# Patient Record
Sex: MALE | Race: WHITE | Employment: FULL TIME | URBAN - METROPOLITAN AREA
[De-identification: names, ages, dates, MRNs, and addresses within clinical notes are randomized per-mention and may not be internally consistent; named-entity substitution may affect disease eponyms.]

---

## 2017-03-14 DIAGNOSIS — G47.09 OTHER INSOMNIA: ICD-10-CM

## 2017-03-14 RX ORDER — ZOLPIDEM TARTRATE 10 MG/1
TABLET ORAL
Qty: 30 TABLET | Refills: 0 | Status: SHIPPED | OUTPATIENT
Start: 2017-03-14 | End: 2018-02-05 | Stop reason: ALTCHOICE

## 2017-04-13 DIAGNOSIS — I10 ESSENTIAL HYPERTENSION: ICD-10-CM

## 2017-04-13 RX ORDER — NIFEDIPINE 60 MG/1
60 TABLET, EXTENDED RELEASE ORAL DAILY
Qty: 30 TABLET | Refills: 3 | Status: SHIPPED | OUTPATIENT
Start: 2017-04-13 | End: 2018-02-05 | Stop reason: ALTCHOICE

## 2018-01-08 ENCOUNTER — OFFICE VISIT (OUTPATIENT)
Dept: FAMILY MEDICINE CLINIC | Age: 53
End: 2018-01-08

## 2018-01-08 VITALS
HEART RATE: 61 BPM | HEIGHT: 71 IN | WEIGHT: 227 LBS | SYSTOLIC BLOOD PRESSURE: 148 MMHG | DIASTOLIC BLOOD PRESSURE: 94 MMHG | BODY MASS INDEX: 31.78 KG/M2 | OXYGEN SATURATION: 97 % | RESPIRATION RATE: 14 BRPM

## 2018-01-08 DIAGNOSIS — I10 ESSENTIAL HYPERTENSION: Primary | ICD-10-CM

## 2018-01-08 DIAGNOSIS — E78.2 MIXED HYPERLIPIDEMIA: ICD-10-CM

## 2018-01-08 DIAGNOSIS — F17.200 SMOKER: ICD-10-CM

## 2018-01-08 PROCEDURE — 4004F PT TOBACCO SCREEN RCVD TLK: CPT | Performed by: FAMILY MEDICINE

## 2018-01-08 PROCEDURE — G8427 DOCREV CUR MEDS BY ELIG CLIN: HCPCS | Performed by: FAMILY MEDICINE

## 2018-01-08 PROCEDURE — 99214 OFFICE O/P EST MOD 30 MIN: CPT | Performed by: FAMILY MEDICINE

## 2018-01-08 PROCEDURE — G8417 CALC BMI ABV UP PARAM F/U: HCPCS | Performed by: FAMILY MEDICINE

## 2018-01-08 PROCEDURE — 3017F COLORECTAL CA SCREEN DOC REV: CPT | Performed by: FAMILY MEDICINE

## 2018-01-08 PROCEDURE — G8484 FLU IMMUNIZE NO ADMIN: HCPCS | Performed by: FAMILY MEDICINE

## 2018-01-08 RX ORDER — LOSARTAN POTASSIUM AND HYDROCHLOROTHIAZIDE 12.5; 5 MG/1; MG/1
1 TABLET ORAL DAILY
Qty: 30 TABLET | Refills: 3 | Status: SHIPPED | OUTPATIENT
Start: 2018-01-08 | End: 2018-02-05 | Stop reason: SDUPTHER

## 2018-01-08 RX ORDER — VARENICLINE TARTRATE 25 MG
KIT ORAL
Qty: 1 EACH | Refills: 0 | Status: SHIPPED | OUTPATIENT
Start: 2018-01-08 | End: 2019-01-03

## 2018-01-08 ASSESSMENT — PATIENT HEALTH QUESTIONNAIRE - PHQ9
2. FEELING DOWN, DEPRESSED OR HOPELESS: 0
SUM OF ALL RESPONSES TO PHQ9 QUESTIONS 1 & 2: 0
SUM OF ALL RESPONSES TO PHQ QUESTIONS 1-9: 0
1. LITTLE INTEREST OR PLEASURE IN DOING THINGS: 0

## 2018-01-08 NOTE — PROGRESS NOTES
Nura Cowan is a 45 y.o. male. HPI:  Mr. Kallie Thomason is a 45 y.o male who is is here for a BP check up. He is currently taking nifedipine for management of his HTN. He takes it off and on, but has been taking it regularly recently. He does not take it regularly because it makes him feel nauseated and dizzy. He gets those symptoms every time he starts the medication. He also is prescribed simvastatin for hyperlipidemia, which he hasn't taken it since July because he believed they caused him to get easier to get sunburned. He described that he got sunburned over the summer, and that the pain caused by it was intense. He heard somewhere that statins caused increased sunburns so he stopped taking it and has not taken it since. Patient does not believe he is getting a lot of physical activity lately. He believes his diet is adequate. Patient is a smoker. Patient started Chantix last year and quit, but started back up in July. He cites the fact that everyone at work smokes as a reason why it is hard to quit. He says he wants to quit, but has been unable to do so longer than a year. No history of COPD. Patient notes that he can get short of breath with physical activity, but it has not caused him any problems. Wt Readings from Last 3 Encounters:   01/08/18 227 lb (103 kg)   02/20/17 230 lb (104.3 kg)   04/13/16 232 lb (105.2 kg)     Meds, vitamins and allergies reviewed with Patient    ROS:  Gen: no fever  HEENT:  No cold symptoms, no sore throat. CV:  Denies chest pain, endorses occasional palpitation at night  Pulm:  Denies cough, endorses shortness of breath. Abd:  Denies abdominal pain, nausea and vomiting. Skin: no rash    Allergies   Allergen Reactions    Lisinopril Other (See Comments)     Cough         Prior to Visit Medications    Medication Sig Taking?  Authorizing Provider   NIFEdipine (NIFEDICAL XL) 60 MG extended release tablet Take 1 tablet by mouth daily Yes Thomas Hinton MD   zolpidem

## 2018-01-29 DIAGNOSIS — E78.2 MIXED HYPERLIPIDEMIA: ICD-10-CM

## 2018-01-29 DIAGNOSIS — I10 ESSENTIAL HYPERTENSION: ICD-10-CM

## 2018-01-29 LAB
A/G RATIO: 1.9 (ref 1.1–2.2)
ALBUMIN SERPL-MCNC: 4.5 G/DL (ref 3.4–5)
ALP BLD-CCNC: 84 U/L (ref 40–129)
ALT SERPL-CCNC: 11 U/L (ref 10–40)
ANION GAP SERPL CALCULATED.3IONS-SCNC: 15 MMOL/L (ref 3–16)
AST SERPL-CCNC: 12 U/L (ref 15–37)
BASOPHILS ABSOLUTE: 0 K/UL (ref 0–0.2)
BASOPHILS RELATIVE PERCENT: 0.6 %
BILIRUB SERPL-MCNC: 0.5 MG/DL (ref 0–1)
BUN BLDV-MCNC: 18 MG/DL (ref 7–20)
CALCIUM SERPL-MCNC: 9.6 MG/DL (ref 8.3–10.6)
CHLORIDE BLD-SCNC: 97 MMOL/L (ref 99–110)
CHOLESTEROL, TOTAL: 228 MG/DL (ref 0–199)
CO2: 28 MMOL/L (ref 21–32)
CREAT SERPL-MCNC: 1.1 MG/DL (ref 0.9–1.3)
EOSINOPHILS ABSOLUTE: 0.2 K/UL (ref 0–0.6)
EOSINOPHILS RELATIVE PERCENT: 2 %
GFR AFRICAN AMERICAN: >60
GFR NON-AFRICAN AMERICAN: >60
GLOBULIN: 2.4 G/DL
GLUCOSE BLD-MCNC: 96 MG/DL (ref 70–99)
HCT VFR BLD CALC: 46.6 % (ref 40.5–52.5)
HDLC SERPL-MCNC: 31 MG/DL (ref 40–60)
HEMOGLOBIN: 15.8 G/DL (ref 13.5–17.5)
HEPATITIS C ANTIBODY INTERPRETATION: NORMAL
LDL CHOLESTEROL CALCULATED: ABNORMAL MG/DL
LDL CHOLESTEROL DIRECT: 133 MG/DL
LYMPHOCYTES ABSOLUTE: 2.1 K/UL (ref 1–5.1)
LYMPHOCYTES RELATIVE PERCENT: 26.5 %
MCH RBC QN AUTO: 30.8 PG (ref 26–34)
MCHC RBC AUTO-ENTMCNC: 33.9 G/DL (ref 31–36)
MCV RBC AUTO: 90.7 FL (ref 80–100)
MONOCYTES ABSOLUTE: 0.8 K/UL (ref 0–1.3)
MONOCYTES RELATIVE PERCENT: 10 %
NEUTROPHILS ABSOLUTE: 4.9 K/UL (ref 1.7–7.7)
NEUTROPHILS RELATIVE PERCENT: 60.9 %
PDW BLD-RTO: 13.3 % (ref 12.4–15.4)
PLATELET # BLD: 230 K/UL (ref 135–450)
PMV BLD AUTO: 7.7 FL (ref 5–10.5)
POTASSIUM SERPL-SCNC: 4.6 MMOL/L (ref 3.5–5.1)
PROSTATE SPECIFIC ANTIGEN: 1.72 NG/ML (ref 0–4)
RBC # BLD: 5.14 M/UL (ref 4.2–5.9)
SODIUM BLD-SCNC: 140 MMOL/L (ref 136–145)
TOTAL PROTEIN: 6.9 G/DL (ref 6.4–8.2)
TRIGL SERPL-MCNC: 463 MG/DL (ref 0–150)
TSH SERPL DL<=0.05 MIU/L-ACNC: 1.46 UIU/ML (ref 0.27–4.2)
VLDLC SERPL CALC-MCNC: ABNORMAL MG/DL
WBC # BLD: 8 K/UL (ref 4–11)

## 2018-01-30 LAB
HIV AG/AB: NORMAL
HIV ANTIGEN: NORMAL
HIV-1 ANTIBODY: NORMAL
HIV-2 AB: NORMAL

## 2018-02-05 ENCOUNTER — TELEPHONE (OUTPATIENT)
Dept: FAMILY MEDICINE CLINIC | Age: 53
End: 2018-02-05

## 2018-02-05 ENCOUNTER — OFFICE VISIT (OUTPATIENT)
Dept: FAMILY MEDICINE CLINIC | Age: 53
End: 2018-02-05

## 2018-02-05 VITALS
HEIGHT: 72 IN | OXYGEN SATURATION: 96 % | RESPIRATION RATE: 14 BRPM | SYSTOLIC BLOOD PRESSURE: 130 MMHG | WEIGHT: 228 LBS | DIASTOLIC BLOOD PRESSURE: 80 MMHG | HEART RATE: 65 BPM | BODY MASS INDEX: 30.88 KG/M2

## 2018-02-05 DIAGNOSIS — Z12.11 SCREENING FOR COLON CANCER: Primary | ICD-10-CM

## 2018-02-05 DIAGNOSIS — R53.83 FATIGUE, UNSPECIFIED TYPE: ICD-10-CM

## 2018-02-05 DIAGNOSIS — Z72.0 TOBACCO ABUSE: ICD-10-CM

## 2018-02-05 DIAGNOSIS — E78.2 MIXED HYPERLIPIDEMIA: ICD-10-CM

## 2018-02-05 DIAGNOSIS — I10 ESSENTIAL HYPERTENSION: ICD-10-CM

## 2018-02-05 PROCEDURE — G8484 FLU IMMUNIZE NO ADMIN: HCPCS | Performed by: FAMILY MEDICINE

## 2018-02-05 PROCEDURE — G8417 CALC BMI ABV UP PARAM F/U: HCPCS | Performed by: FAMILY MEDICINE

## 2018-02-05 PROCEDURE — 4004F PT TOBACCO SCREEN RCVD TLK: CPT | Performed by: FAMILY MEDICINE

## 2018-02-05 PROCEDURE — 99214 OFFICE O/P EST MOD 30 MIN: CPT | Performed by: FAMILY MEDICINE

## 2018-02-05 PROCEDURE — G8427 DOCREV CUR MEDS BY ELIG CLIN: HCPCS | Performed by: FAMILY MEDICINE

## 2018-02-05 PROCEDURE — 3017F COLORECTAL CA SCREEN DOC REV: CPT | Performed by: FAMILY MEDICINE

## 2018-02-05 RX ORDER — AMOXICILLIN 500 MG/1
500 CAPSULE ORAL 3 TIMES DAILY
Qty: 30 CAPSULE | Refills: 0 | Status: SHIPPED | OUTPATIENT
Start: 2018-02-05 | End: 2018-02-15

## 2018-02-05 RX ORDER — VARENICLINE TARTRATE 1 MG/1
1 TABLET, FILM COATED ORAL 2 TIMES DAILY
Qty: 60 TABLET | Refills: 3 | Status: SHIPPED | OUTPATIENT
Start: 2018-02-05 | End: 2019-01-03 | Stop reason: ALTCHOICE

## 2018-02-05 RX ORDER — SIMVASTATIN 40 MG
40 TABLET ORAL EVERY EVENING
Qty: 30 TABLET | Refills: 5 | Status: SHIPPED | OUTPATIENT
Start: 2018-02-05 | End: 2018-11-05 | Stop reason: SDUPTHER

## 2018-02-05 RX ORDER — LOSARTAN POTASSIUM AND HYDROCHLOROTHIAZIDE 12.5; 5 MG/1; MG/1
1 TABLET ORAL DAILY
Qty: 30 TABLET | Refills: 5 | Status: SHIPPED | OUTPATIENT
Start: 2018-02-05 | End: 2019-07-25 | Stop reason: ALTCHOICE

## 2018-02-05 NOTE — PATIENT INSTRUCTIONS
instruction, always ask your healthcare professional. [address] any warranty or liability for your use of this information. Patient Education        Acute High Blood Pressure: Care Instructions  Your Care Instructions    Acute high blood pressure is very high blood pressure. It's a serious problem. Very high blood pressure can damage your brain, heart, eyes, and kidneys. You may have been given medicines to lower your blood pressure. You may have gotten them as pills or through a needle in one of your veins. This is called an IV. And maybe you were given other medicines too. These can be needed when high blood pressure causes other problems. To keep your blood pressure at a lower level, you may need to make healthy lifestyle changes. And you will probably need to take medicines. Be sure to follow up with your doctor about your blood pressure and what you can do about it. Follow-up care is a key part of your treatment and safety. Be sure to make and go to all appointments, and call your doctor if you are having problems. It's also a good idea to know your test results and keep a list of the medicines you take. How can you care for yourself at home? · See your doctor as often as he or she recommends. This is to make sure your blood pressure is under control. You will probably go at least 2 times a year. But it may be more often at first.  · Take your blood pressure medicine exactly as prescribed. You may take one or more types. They include diuretics, beta-blockers, ACE inhibitors, calcium channel blockers, and angiotensin II receptor blockers. Call your doctor if you think you are having a problem with your medicine. · If you take blood pressure medicine, talk to your doctor before you take decongestants or anti-inflammatory medicine, such as ibuprofen. These can raise blood pressure. · Learn how to check your blood pressure at home. Check it often.   · Ask your doctor if it's

## 2018-02-05 NOTE — PROGRESS NOTES
mouth daily  Dispense: 30 tablet; Refill: 5  Suggest she add a baby aspirin as well for primary prevention  3. Mixed hyperlipidemia  Cholesterol back up off medication refill  - simvastatin (ZOCOR) 40 MG tablet; Take 1 tablet by mouth every evening  Dispense: 30 tablet; Refill: 5  - Lipid Panel; Future in 3 months    4. Tobacco abuse  Condition to stay on the continuing pack at least 2 months possibly up to 5  - varenicline (CHANTIX CONTINUING MONTH RALPH) 1 MG tablet; Take 1 tablet by mouth 2 times daily  Dispense: 60 tablet; Refill: 3    5.  Fatigue, unspecified type  Repeat testosterone level at next visit in 3 months  - Testosterone Free and Total Male; Future      Spent 25 minutes with patient greater than 50% time reviewing labs and corning his care

## 2018-02-05 NOTE — TELEPHONE ENCOUNTER
[facility] is having a problem w/e-scribing RX today  Pharmacy was given verbal order for 4 medications written today by Dr. Denisha Lucas

## 2018-05-04 DIAGNOSIS — E78.2 MIXED HYPERLIPIDEMIA: ICD-10-CM

## 2018-05-04 DIAGNOSIS — R53.83 FATIGUE, UNSPECIFIED TYPE: ICD-10-CM

## 2018-05-04 LAB
CHOLESTEROL, TOTAL: 179 MG/DL (ref 0–199)
HDLC SERPL-MCNC: 37 MG/DL (ref 40–60)
LDL CHOLESTEROL CALCULATED: 89 MG/DL
TRIGL SERPL-MCNC: 265 MG/DL (ref 0–150)
VLDLC SERPL CALC-MCNC: 53 MG/DL

## 2018-05-08 LAB
SEX HORMONE BINDING GLOBULIN: 21 NMOL/L (ref 11–80)
TESTOSTERONE FREE-NONMALE: 64.5 PG/ML (ref 47–244)
TESTOSTERONE TOTAL: 262 NG/DL (ref 220–1000)

## 2018-11-05 DIAGNOSIS — E78.2 MIXED HYPERLIPIDEMIA: ICD-10-CM

## 2018-11-05 RX ORDER — SIMVASTATIN 40 MG
40 TABLET ORAL EVERY EVENING
Qty: 30 TABLET | Refills: 3 | Status: SHIPPED | OUTPATIENT
Start: 2018-11-05 | End: 2019-04-10 | Stop reason: SDUPTHER

## 2018-11-05 NOTE — TELEPHONE ENCOUNTER
LOV:2/5/18  LRF: #30 5RF  Lab Results   Component Value Date    CHOL 179 05/04/2018    CHOL 228 (H) 01/29/2018    CHOL 178 09/29/2015     Lab Results   Component Value Date    TRIG 265 (H) 05/04/2018    TRIG 463 (H) 01/29/2018    TRIG 473 (H) 09/29/2015     Lab Results   Component Value Date    HDL 37 (L) 05/04/2018    HDL 31 (L) 01/29/2018    HDL 29 (L) 09/29/2015     Lab Results   Component Value Date    LDLCALC 89 05/04/2018    LDLCALC see below 01/29/2018    LDLCALC see below 09/29/2015     Lab Results   Component Value Date    LABVLDL 53 05/04/2018    LABVLDL see below 01/29/2018    LABVLDL see below 09/29/2015     No results found for: CHOLAMOR

## 2019-01-03 ENCOUNTER — OFFICE VISIT (OUTPATIENT)
Dept: FAMILY MEDICINE CLINIC | Age: 54
End: 2019-01-03
Payer: COMMERCIAL

## 2019-01-03 ENCOUNTER — TELEPHONE (OUTPATIENT)
Dept: FAMILY MEDICINE CLINIC | Age: 54
End: 2019-01-03

## 2019-01-03 VITALS
BODY MASS INDEX: 34.5 KG/M2 | HEART RATE: 58 BPM | OXYGEN SATURATION: 99 % | SYSTOLIC BLOOD PRESSURE: 156 MMHG | DIASTOLIC BLOOD PRESSURE: 95 MMHG | WEIGHT: 254.4 LBS

## 2019-01-03 DIAGNOSIS — E78.2 MIXED HYPERLIPIDEMIA: ICD-10-CM

## 2019-01-03 DIAGNOSIS — S05.01XD ABRASION OF RIGHT CORNEA, SUBSEQUENT ENCOUNTER: ICD-10-CM

## 2019-01-03 DIAGNOSIS — I10 ESSENTIAL HYPERTENSION: Primary | ICD-10-CM

## 2019-01-03 PROCEDURE — 4004F PT TOBACCO SCREEN RCVD TLK: CPT | Performed by: FAMILY MEDICINE

## 2019-01-03 PROCEDURE — 99213 OFFICE O/P EST LOW 20 MIN: CPT | Performed by: FAMILY MEDICINE

## 2019-01-03 PROCEDURE — G8427 DOCREV CUR MEDS BY ELIG CLIN: HCPCS | Performed by: FAMILY MEDICINE

## 2019-01-03 PROCEDURE — G8484 FLU IMMUNIZE NO ADMIN: HCPCS | Performed by: FAMILY MEDICINE

## 2019-01-03 PROCEDURE — G8417 CALC BMI ABV UP PARAM F/U: HCPCS | Performed by: FAMILY MEDICINE

## 2019-01-03 PROCEDURE — 3017F COLORECTAL CA SCREEN DOC REV: CPT | Performed by: FAMILY MEDICINE

## 2019-01-03 RX ORDER — HYDROCHLOROTHIAZIDE 12.5 MG/1
12.5 CAPSULE, GELATIN COATED ORAL EVERY MORNING
Qty: 30 CAPSULE | Refills: 5 | Status: SHIPPED | OUTPATIENT
Start: 2019-01-03 | End: 2019-07-25 | Stop reason: ALTCHOICE

## 2019-01-03 RX ORDER — OFLOXACIN 3 MG/ML
SOLUTION/ DROPS OPHTHALMIC
Refills: 0 | COMMUNITY
Start: 2018-12-09 | End: 2019-07-25

## 2019-01-03 RX ORDER — AMLODIPINE BESYLATE 10 MG/1
10 TABLET ORAL DAILY
Qty: 30 TABLET | Refills: 3 | Status: SHIPPED | OUTPATIENT
Start: 2019-01-03 | End: 2019-05-21 | Stop reason: SDUPTHER

## 2019-01-07 ENCOUNTER — TELEPHONE (OUTPATIENT)
Dept: FAMILY MEDICINE CLINIC | Age: 54
End: 2019-01-07

## 2019-01-07 DIAGNOSIS — H57.11 PAIN OF RIGHT EYE: Primary | ICD-10-CM

## 2019-04-10 DIAGNOSIS — E78.2 MIXED HYPERLIPIDEMIA: ICD-10-CM

## 2019-04-10 RX ORDER — SIMVASTATIN 40 MG
40 TABLET ORAL NIGHTLY
Qty: 30 TABLET | Refills: 5 | Status: SHIPPED | OUTPATIENT
Start: 2019-04-10 | End: 2019-05-14 | Stop reason: SDUPTHER

## 2019-04-10 NOTE — TELEPHONE ENCOUNTER
LOV: 1/3/19- HTN    LRF: 11/5/18 ,# 30, 3 refills    Next Ov: none    Pt due for labs. Do you want to see pt for OV as well?     Lab order and refill of 30 days pending        Lab Results   Component Value Date    CHOL 179 05/04/2018    CHOL 228 (H) 01/29/2018    CHOL 178 09/29/2015     Lab Results   Component Value Date    TRIG 265 (H) 05/04/2018    TRIG 463 (H) 01/29/2018    TRIG 473 (H) 09/29/2015     Lab Results   Component Value Date    HDL 37 (L) 05/04/2018    HDL 31 (L) 01/29/2018    HDL 29 (L) 09/29/2015     Lab Results   Component Value Date    LDLCALC 89 05/04/2018    LDLCALC see below 01/29/2018    LDLCALC see below 09/29/2015     Lab Results   Component Value Date    LABVLDL 53 05/04/2018    LABVLDL see below 01/29/2018    LABVLDL see below 09/29/2015     No results found for: TERRENCE

## 2019-05-14 ENCOUNTER — OFFICE VISIT (OUTPATIENT)
Dept: FAMILY MEDICINE CLINIC | Age: 54
End: 2019-05-14
Payer: COMMERCIAL

## 2019-05-14 VITALS
OXYGEN SATURATION: 98 % | HEIGHT: 72 IN | SYSTOLIC BLOOD PRESSURE: 154 MMHG | HEART RATE: 65 BPM | BODY MASS INDEX: 33.18 KG/M2 | WEIGHT: 245 LBS | DIASTOLIC BLOOD PRESSURE: 95 MMHG

## 2019-05-14 DIAGNOSIS — E78.2 MIXED HYPERLIPIDEMIA: ICD-10-CM

## 2019-05-14 PROCEDURE — 3017F COLORECTAL CA SCREEN DOC REV: CPT | Performed by: FAMILY MEDICINE

## 2019-05-14 PROCEDURE — G8427 DOCREV CUR MEDS BY ELIG CLIN: HCPCS | Performed by: FAMILY MEDICINE

## 2019-05-14 PROCEDURE — 99213 OFFICE O/P EST LOW 20 MIN: CPT | Performed by: FAMILY MEDICINE

## 2019-05-14 PROCEDURE — 1036F TOBACCO NON-USER: CPT | Performed by: FAMILY MEDICINE

## 2019-05-14 PROCEDURE — G8417 CALC BMI ABV UP PARAM F/U: HCPCS | Performed by: FAMILY MEDICINE

## 2019-05-14 RX ORDER — SIMVASTATIN 40 MG
40 TABLET ORAL NIGHTLY
Qty: 30 TABLET | Refills: 5 | Status: SHIPPED | OUTPATIENT
Start: 2019-05-14 | End: 2019-08-27 | Stop reason: SDUPTHER

## 2019-05-14 RX ORDER — SULFACETAMIDE SODIUM 100 MG/ML
2 SOLUTION/ DROPS OPHTHALMIC 4 TIMES DAILY
Qty: 1 BOTTLE | Refills: 0 | Status: SHIPPED | OUTPATIENT
Start: 2019-05-14 | End: 2019-10-14 | Stop reason: SDUPTHER

## 2019-05-14 RX ORDER — OLOPATADINE HYDROCHLORIDE 1 MG/ML
1 SOLUTION/ DROPS OPHTHALMIC 2 TIMES DAILY
Qty: 1 BOTTLE | Refills: 1 | Status: SHIPPED | OUTPATIENT
Start: 2019-05-14 | End: 2019-06-13

## 2019-05-14 ASSESSMENT — PATIENT HEALTH QUESTIONNAIRE - PHQ9
SUM OF ALL RESPONSES TO PHQ QUESTIONS 1-9: 0
SUM OF ALL RESPONSES TO PHQ9 QUESTIONS 1 & 2: 0
2. FEELING DOWN, DEPRESSED OR HOPELESS: 0
SUM OF ALL RESPONSES TO PHQ QUESTIONS 1-9: 0
1. LITTLE INTEREST OR PLEASURE IN DOING THINGS: 0

## 2019-05-14 NOTE — PROGRESS NOTES
Gallo Shukla is a 52 y.o. male. HPI: Seen here several months ago for corneal abrasion  Then went to this incisor tooth and they thought his abrasion had resolved but gave him and by x-ray bacterial pinkeye  Patient states that his right eye never fully recovered is always been kind of scratchy and itchy in both eyes in the last week become scratchy itchy and had some discharge  Denies any other allergy symptoms such as sneezing runny nose scratchy throat  Does not wear contact lenses  No recent eye trauma and no change in vision  Meds, vitamins and allergies reviewed with pt    ROS: No TIA's or unusual headaches, no dysphagia. No prolonged cough. No dyspnea or chest pain on exertion. No abdominal pain, change in bowel habits, black or bloody stools. No urinary tract symptoms. No new or unusual musculoskeletal symptoms. Prior to Visit Medications    Medication Sig Taking?  Authorizing Provider   sulfacetamide (BLEPH-10) 10 % ophthalmic solution Place 2 drops into the right eye 4 times daily for 10 days Yes Quan So MD   olopatadine (PATANOL) 0.1 % ophthalmic solution Place 1 drop into both eyes 2 times daily Yes Quan So MD   amLODIPine (NORVASC) 10 MG tablet Take 1 tablet by mouth daily Yes Quan So MD   hydrochlorothiazide (MICROZIDE) 12.5 MG capsule Take 1 capsule by mouth every morning Yes Quan So MD   simvastatin (ZOCOR) 40 MG tablet Take 1 tablet by mouth nightly  Quan So MD   ofloxacin (OCUFLOX) 0.3 % solution USE 1-2 DROPS AFFECTED EYE EVERY 2 HOURS WHILE AWAKE X2DAYS, THEN EVERY 4 HRS WHILE AWAKE [address] [facility] MD Aleida   losartan-hydrochlorothiazide (HYZAAR) 50-12.5 MG per tablet Take 1 tablet by mouth daily  Quan So MD       Past Medical History:   Diagnosis Date    Hyperlipidemia     Hypertension     Meningitis     02/2007    Smoker        Social History     Tobacco Use    Smoking status: Former Smoker Packs/day: 0.50     Years: 30.00     Pack years: 15.00     Types: Cigarettes    Smokeless tobacco: Never Used   Substance Use Topics    Alcohol use: Yes     Alcohol/week: 3.6 oz     Types: 6 Standard drinks or equivalent per week    Drug use: No       Family History   Problem Relation Age of Onset    Heart Disease Father        Allergies   Allergen Reactions    Lisinopril Other (See Comments)     Cough         OBJECTIVE:  BP (!) 154/95   Pulse 65   Ht 6' 0.01\" (1.829 m)   Wt 245 lb (111.1 kg)   SpO2 98%   BMI 33.22 kg/m²   GEN:  in NAD  HEENT:  PERRLA. EOMI. Conjunctiva slightly injected, sclera white  NECK:  Supple without adenopathy. CV:  Regular rate and rhythm, S1 and S2 normal, no murmurs, clicks  PULM:  Chest is clear, no wheezing ,  symmetric air entry throughout both lung fields.       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

## 2019-05-21 RX ORDER — AMLODIPINE BESYLATE 10 MG/1
TABLET ORAL
Qty: 30 TABLET | Refills: 11 | Status: SHIPPED | OUTPATIENT
Start: 2019-05-21 | End: 2019-08-27 | Stop reason: SDUPTHER

## 2019-05-21 NOTE — TELEPHONE ENCOUNTER
Last ov: 05/14/19, Mixed hyperlipidemia  Last refill: 01/03/19, #30, 5 refills    Lab Results   Component Value Date     01/29/2018    K 4.6 01/29/2018    CL 97 (L) 01/29/2018    CO2 28 01/29/2018    BUN 18 01/29/2018    CREATININE 1.1 01/29/2018    GLUCOSE 96 01/29/2018    CALCIUM 9.6 01/29/2018    PROT 6.9 01/29/2018    LABALBU 4.5 01/29/2018    BILITOT 0.5 01/29/2018    ALKPHOS 84 01/29/2018    AST 12 (L) 01/29/2018    ALT 11 01/29/2018    LABGLOM >60 01/29/2018    GFRAA >60 01/29/2018    AGRATIO 1.9 01/29/2018    GLOB 2.4 01/29/2018

## 2019-07-25 ENCOUNTER — OFFICE VISIT (OUTPATIENT)
Dept: FAMILY MEDICINE CLINIC | Age: 54
End: 2019-07-25
Payer: COMMERCIAL

## 2019-07-25 ENCOUNTER — NURSE TRIAGE (OUTPATIENT)
Dept: OTHER | Facility: CLINIC | Age: 54
End: 2019-07-25

## 2019-07-25 VITALS
SYSTOLIC BLOOD PRESSURE: 154 MMHG | WEIGHT: 241.4 LBS | BODY MASS INDEX: 32.73 KG/M2 | OXYGEN SATURATION: 98 % | HEART RATE: 62 BPM | DIASTOLIC BLOOD PRESSURE: 86 MMHG

## 2019-07-25 DIAGNOSIS — I10 ESSENTIAL HYPERTENSION: Primary | ICD-10-CM

## 2019-07-25 DIAGNOSIS — L98.9 SKIN LESION OF LEFT LEG: ICD-10-CM

## 2019-07-25 DIAGNOSIS — R79.89 LOW TESTOSTERONE IN MALE: ICD-10-CM

## 2019-07-25 DIAGNOSIS — Z12.5 SCREENING FOR PROSTATE CANCER: ICD-10-CM

## 2019-07-25 DIAGNOSIS — Z13.1 SCREENING FOR DIABETES MELLITUS: ICD-10-CM

## 2019-07-25 PROCEDURE — G8427 DOCREV CUR MEDS BY ELIG CLIN: HCPCS | Performed by: NURSE PRACTITIONER

## 2019-07-25 PROCEDURE — 99214 OFFICE O/P EST MOD 30 MIN: CPT | Performed by: NURSE PRACTITIONER

## 2019-07-25 PROCEDURE — 1036F TOBACCO NON-USER: CPT | Performed by: NURSE PRACTITIONER

## 2019-07-25 PROCEDURE — G8417 CALC BMI ABV UP PARAM F/U: HCPCS | Performed by: NURSE PRACTITIONER

## 2019-07-25 PROCEDURE — 3017F COLORECTAL CA SCREEN DOC REV: CPT | Performed by: NURSE PRACTITIONER

## 2019-07-25 RX ORDER — HYDROCHLOROTHIAZIDE 25 MG/1
25 TABLET ORAL EVERY MORNING
Qty: 90 TABLET | Refills: 1 | Status: ON HOLD | OUTPATIENT
Start: 2019-07-25 | End: 2019-08-13 | Stop reason: HOSPADM

## 2019-07-25 RX ORDER — CEPHALEXIN 500 MG/1
500 CAPSULE ORAL 3 TIMES DAILY
Qty: 30 CAPSULE | Refills: 0 | Status: SHIPPED | OUTPATIENT
Start: 2019-07-25 | End: 2019-08-04

## 2019-07-25 ASSESSMENT — ENCOUNTER SYMPTOMS
COUGH: 0
CHEST TIGHTNESS: 0
SHORTNESS OF BREATH: 0

## 2019-07-25 NOTE — PROGRESS NOTES
SUBJECTIVE:  Pt is a of 62 y.o. male comes in today with   Chief Complaint   Patient presents with    Wound Infection     pt has infected wound on left leg. x 1 week        Patient presenting today for evaluation of redness and swelling to left leg. Started approx 1 week ago, denies injury. Worried it could be spider bite  No fevers/malaise  No itching  No pain  No drainage, using bandaid and appears to have pus on bandaid  Using abx ointment and worsening      Hypertension:  Home blood pressure monitoring: Yes - 140's-150's/80's-90's. He is adherent to a low sodium diet. C/o sensation \"that something is wrong, but can't pinpoint what\". Patient denies chest pain, shortness of breath, headache, lightheadedness, blurred vision, peripheral edema and palpitations. Antihypertensive medication side effects: no medication side effects noted. Use of agents associated with hypertension: none. He is  exercising regularly- weights and cardio 4-5/week. None in past month  Is the patient taking any over the counter medications? No   If yes, see med list as above  Is the patient taking a daily aspirin? No    Low testosterone: no supplement since 2013. Not sure why it was stopped. (+) fatigue, decreased libido and body aches. previously taking androgel    Prior to Visit Medications    Medication Sig Taking? Authorizing Provider   amLODIPine (NORVASC) 10 MG tablet TAKE 1 TABLET BY MOUTH EVERY DAY Yes Amparo Bruce MD   simvastatin (ZOCOR) 40 MG tablet Take 1 tablet by mouth nightly Yes Amparo Bruce MD   hydrochlorothiazide (MICROZIDE) 12.5 MG capsule Take 1 capsule by mouth every morning Yes Amparo Bruce MD   losartan-hydrochlorothiazide (HYZAAR) 50-12.5 MG per tablet Take 1 tablet by mouth daily Yes Amparo Bruce MD         Patient's allergies, past medical, surgical, social and family histories werereviewed and updated as appropriate. Review of Systems   Constitutional: Positive for fatigue. in male  -     Testosterone, free, total; Future    4. Screening for prostate cancer  -     PSA, Total and Free; Future    5. Screening for diabetes mellitus  -     Hemoglobin A1C; Future  See pt instructions  F/u 3-4 weeks for HTN visit  Discussed use, benefit, and side effects of prescribed medications. All patient questions answered. Pt voiced understanding.

## 2019-07-25 NOTE — PATIENT INSTRUCTIONS
Patient Education        Learning About Diuretics for High Blood Pressure  Introduction  Diuretics help to lower blood pressure. This reduces your risk of a heart attack and stroke. It also reduces your risk of kidney disease. Diuretics cause your kidneys to remove sodium and water. They also relax the blood vessel walls. These help lower your blood pressure. Examples  · Chlorthalidone  · Hydrochlorothiazide  Possible side effects  There are some common side effects. They are:  · Too little potassium. · Feeling dizzy. · Rash. · Urinating a lot. · High blood sugar. (But this is not common.)  You may have other side effects. Check the information that comes with your medicine. What to know about taking this medicine  · You may take other medicines for blood pressure. Diuretics can help those work better. They can also prevent extra fluid in your body. · You may need to take potassium pills. Or you may have to watch how much potassium is in your food. Ask your doctor about this. · You may need blood tests to check your kidneys and your potassium level. · Take your medicines exactly as prescribed. Call your doctor if you think you are having a problem with your medicine. · Check with your doctor or pharmacist before you use any other medicines. This includes over-the-counter medicines. Make sure your doctor knows all of the medicines, vitamins, herbal products, and supplements you take. Taking some medicines together can cause problems. Where can you learn more? Go to [email or web address].[email or web address]. org and sign in to your [employer] account. Enter G431 in the [facility] box to learn more about \"Learning About Diuretics for High Blood Pressure. \"     If you do not have an account, please click on the \"Sign Up Now\" link. Current as of: July 22, 2018  Content Version: 12.0  © 9814-3813 [employer]wise, [employer]orporated. Care instructions adapted under license by [facility] ([facility]).  If you have questions

## 2019-08-05 RX ORDER — HYDROCHLOROTHIAZIDE 12.5 MG/1
CAPSULE, GELATIN COATED ORAL
Qty: 30 CAPSULE | Refills: 11 | Status: ON HOLD | OUTPATIENT
Start: 2019-08-05 | End: 2019-08-12 | Stop reason: DRUGHIGH

## 2019-08-11 ENCOUNTER — APPOINTMENT (OUTPATIENT)
Dept: GENERAL RADIOLOGY | Age: 54
End: 2019-08-11
Payer: COMMERCIAL

## 2019-08-11 ENCOUNTER — HOSPITAL ENCOUNTER (OUTPATIENT)
Age: 54
Setting detail: OBSERVATION
Discharge: HOME | End: 2019-08-13
Attending: EMERGENCY MEDICINE | Admitting: PEDIATRICS
Payer: COMMERCIAL

## 2019-08-11 DIAGNOSIS — R07.9 CHEST PAIN, UNSPECIFIED TYPE: Primary | ICD-10-CM

## 2019-08-11 LAB
ANION GAP SERPL CALCULATED.3IONS-SCNC: 12 MMOL/L (ref 3–16)
BASOPHILS ABSOLUTE: 0.1 K/UL (ref 0–0.2)
BASOPHILS RELATIVE PERCENT: 0.7 %
BUN BLDV-MCNC: 23 MG/DL (ref 7–20)
CALCIUM SERPL-MCNC: 9.6 MG/DL (ref 8.3–10.6)
CHLORIDE BLD-SCNC: 102 MMOL/L (ref 99–110)
CO2: 25 MMOL/L (ref 21–32)
CREAT SERPL-MCNC: 1.5 MG/DL (ref 0.9–1.3)
EOSINOPHILS ABSOLUTE: 0.2 K/UL (ref 0–0.6)
EOSINOPHILS RELATIVE PERCENT: 2.1 %
GFR AFRICAN AMERICAN: 59
GFR NON-AFRICAN AMERICAN: 48
GLUCOSE BLD-MCNC: 102 MG/DL (ref 70–99)
HCT VFR BLD CALC: 44.2 % (ref 40.5–52.5)
HEMOGLOBIN: 15.2 G/DL (ref 13.5–17.5)
LYMPHOCYTES ABSOLUTE: 2.2 K/UL (ref 1–5.1)
LYMPHOCYTES RELATIVE PERCENT: 28.6 %
MCH RBC QN AUTO: 31.1 PG (ref 26–34)
MCHC RBC AUTO-ENTMCNC: 34.5 G/DL (ref 31–36)
MCV RBC AUTO: 90.3 FL (ref 80–100)
MONOCYTES ABSOLUTE: 0.8 K/UL (ref 0–1.3)
MONOCYTES RELATIVE PERCENT: 10.2 %
NEUTROPHILS ABSOLUTE: 4.5 K/UL (ref 1.7–7.7)
NEUTROPHILS RELATIVE PERCENT: 58.4 %
PDW BLD-RTO: 13.8 % (ref 12.4–15.4)
PLATELET # BLD: 243 K/UL (ref 135–450)
PMV BLD AUTO: 7.3 FL (ref 5–10.5)
POTASSIUM SERPL-SCNC: 4.1 MMOL/L (ref 3.5–5.1)
RBC # BLD: 4.89 M/UL (ref 4.2–5.9)
SODIUM BLD-SCNC: 139 MMOL/L (ref 136–145)
TROPONIN: <0.01 NG/ML
WBC # BLD: 7.7 K/UL (ref 4–11)

## 2019-08-11 PROCEDURE — G0378 HOSPITAL OBSERVATION PER HR: HCPCS

## 2019-08-11 PROCEDURE — 71046 X-RAY EXAM CHEST 2 VIEWS: CPT

## 2019-08-11 PROCEDURE — 93005 ELECTROCARDIOGRAM TRACING: CPT | Performed by: EMERGENCY MEDICINE

## 2019-08-11 PROCEDURE — 2100000000 HC CCU R&B

## 2019-08-11 PROCEDURE — 80048 BASIC METABOLIC PNL TOTAL CA: CPT

## 2019-08-11 PROCEDURE — 84484 ASSAY OF TROPONIN QUANT: CPT

## 2019-08-11 PROCEDURE — 85025 COMPLETE CBC W/AUTO DIFF WBC: CPT

## 2019-08-11 PROCEDURE — 99285 EMERGENCY DEPT VISIT HI MDM: CPT

## 2019-08-11 RX ORDER — ONDANSETRON 2 MG/ML
4 INJECTION INTRAMUSCULAR; INTRAVENOUS EVERY 6 HOURS PRN
Status: DISCONTINUED | OUTPATIENT
Start: 2019-08-11 | End: 2019-08-13 | Stop reason: HOSPADM

## 2019-08-11 RX ORDER — SODIUM CHLORIDE 0.9 % (FLUSH) 0.9 %
10 SYRINGE (ML) INJECTION EVERY 12 HOURS SCHEDULED
Status: DISCONTINUED | OUTPATIENT
Start: 2019-08-12 | End: 2019-08-13 | Stop reason: HOSPADM

## 2019-08-11 RX ORDER — SIMVASTATIN 40 MG
40 TABLET ORAL NIGHTLY
Status: DISCONTINUED | OUTPATIENT
Start: 2019-08-12 | End: 2019-08-11 | Stop reason: CLARIF

## 2019-08-11 RX ORDER — AMLODIPINE BESYLATE 5 MG/1
10 TABLET ORAL DAILY
Status: DISCONTINUED | OUTPATIENT
Start: 2019-08-12 | End: 2019-08-13 | Stop reason: HOSPADM

## 2019-08-11 RX ORDER — HYDROCHLOROTHIAZIDE 25 MG/1
25 TABLET ORAL EVERY MORNING
Status: DISCONTINUED | OUTPATIENT
Start: 2019-08-12 | End: 2019-08-12

## 2019-08-11 RX ORDER — SODIUM CHLORIDE 0.9 % (FLUSH) 0.9 %
10 SYRINGE (ML) INJECTION PRN
Status: DISCONTINUED | OUTPATIENT
Start: 2019-08-11 | End: 2019-08-13 | Stop reason: HOSPADM

## 2019-08-11 ASSESSMENT — PAIN DESCRIPTION - PAIN TYPE: TYPE: ACUTE PAIN

## 2019-08-11 ASSESSMENT — HEART SCORE: ECG: 0

## 2019-08-11 ASSESSMENT — PAIN SCALES - GENERAL: PAINLEVEL_OUTOF10: 1

## 2019-08-11 ASSESSMENT — PAIN DESCRIPTION - LOCATION: LOCATION: CHEST

## 2019-08-12 ENCOUNTER — APPOINTMENT (OUTPATIENT)
Dept: CT IMAGING | Age: 54
End: 2019-08-12
Payer: COMMERCIAL

## 2019-08-12 LAB
ANION GAP SERPL CALCULATED.3IONS-SCNC: 11 MMOL/L (ref 3–16)
BUN BLDV-MCNC: 18 MG/DL (ref 7–20)
CALCIUM SERPL-MCNC: 9.4 MG/DL (ref 8.3–10.6)
CHLORIDE BLD-SCNC: 102 MMOL/L (ref 99–110)
CO2: 25 MMOL/L (ref 21–32)
CREAT SERPL-MCNC: 1.2 MG/DL (ref 0.9–1.3)
EKG ATRIAL RATE: 69 BPM
EKG DIAGNOSIS: NORMAL
EKG P AXIS: -20 DEGREES
EKG P-R INTERVAL: 120 MS
EKG Q-T INTERVAL: 384 MS
EKG QRS DURATION: 88 MS
EKG QTC CALCULATION (BAZETT): 411 MS
EKG R AXIS: 25 DEGREES
EKG T AXIS: 33 DEGREES
EKG VENTRICULAR RATE: 69 BPM
GFR AFRICAN AMERICAN: >60
GFR NON-AFRICAN AMERICAN: >60
GLUCOSE BLD-MCNC: 95 MG/DL (ref 70–99)
HCT VFR BLD CALC: 42.9 % (ref 40.5–52.5)
HEMOGLOBIN: 14.6 G/DL (ref 13.5–17.5)
LV EF: 64 %
LVEF MODALITY: NORMAL
MCH RBC QN AUTO: 30.6 PG (ref 26–34)
MCHC RBC AUTO-ENTMCNC: 34.1 G/DL (ref 31–36)
MCV RBC AUTO: 89.8 FL (ref 80–100)
PDW BLD-RTO: 13.6 % (ref 12.4–15.4)
PLATELET # BLD: 228 K/UL (ref 135–450)
PMV BLD AUTO: 7.5 FL (ref 5–10.5)
POTASSIUM SERPL-SCNC: 4.1 MMOL/L (ref 3.5–5.1)
RBC # BLD: 4.78 M/UL (ref 4.2–5.9)
SODIUM BLD-SCNC: 138 MMOL/L (ref 136–145)
TROPONIN: <0.01 NG/ML
TROPONIN: <0.01 NG/ML
WBC # BLD: 6.6 K/UL (ref 4–11)

## 2019-08-12 PROCEDURE — 84484 ASSAY OF TROPONIN QUANT: CPT

## 2019-08-12 PROCEDURE — 71275 CT ANGIOGRAPHY CHEST: CPT

## 2019-08-12 PROCEDURE — 99244 OFF/OP CNSLTJ NEW/EST MOD 40: CPT | Performed by: INTERNAL MEDICINE

## 2019-08-12 PROCEDURE — 85027 COMPLETE CBC AUTOMATED: CPT

## 2019-08-12 PROCEDURE — 6360000004 HC RX CONTRAST MEDICATION: Performed by: INTERNAL MEDICINE

## 2019-08-12 PROCEDURE — 2580000003 HC RX 258: Performed by: PEDIATRICS

## 2019-08-12 PROCEDURE — 6360000002 HC RX W HCPCS: Performed by: PEDIATRICS

## 2019-08-12 PROCEDURE — 2580000003 HC RX 258: Performed by: INTERNAL MEDICINE

## 2019-08-12 PROCEDURE — 96360 HYDRATION IV INFUSION INIT: CPT

## 2019-08-12 PROCEDURE — G0378 HOSPITAL OBSERVATION PER HR: HCPCS

## 2019-08-12 PROCEDURE — 96372 THER/PROPH/DIAG INJ SC/IM: CPT

## 2019-08-12 PROCEDURE — A9502 TC99M TETROFOSMIN: HCPCS | Performed by: INTERNAL MEDICINE

## 2019-08-12 PROCEDURE — 93017 CV STRESS TEST TRACING ONLY: CPT | Performed by: INTERNAL MEDICINE

## 2019-08-12 PROCEDURE — 93010 ELECTROCARDIOGRAM REPORT: CPT | Performed by: INTERNAL MEDICINE

## 2019-08-12 PROCEDURE — 3430000000 HC RX DIAGNOSTIC RADIOPHARMACEUTICAL: Performed by: INTERNAL MEDICINE

## 2019-08-12 PROCEDURE — 80048 BASIC METABOLIC PNL TOTAL CA: CPT

## 2019-08-12 PROCEDURE — 78452 HT MUSCLE IMAGE SPECT MULT: CPT | Performed by: INTERNAL MEDICINE

## 2019-08-12 PROCEDURE — 94760 N-INVAS EAR/PLS OXIMETRY 1: CPT

## 2019-08-12 PROCEDURE — 6370000000 HC RX 637 (ALT 250 FOR IP): Performed by: PEDIATRICS

## 2019-08-12 PROCEDURE — 6370000000 HC RX 637 (ALT 250 FOR IP): Performed by: INTERNAL MEDICINE

## 2019-08-12 PROCEDURE — 96361 HYDRATE IV INFUSION ADD-ON: CPT

## 2019-08-12 RX ORDER — ROSUVASTATIN CALCIUM 10 MG/1
10 TABLET, COATED ORAL NIGHTLY
Status: DISCONTINUED | OUTPATIENT
Start: 2019-08-12 | End: 2019-08-13 | Stop reason: HOSPADM

## 2019-08-12 RX ORDER — DOXAZOSIN MESYLATE 4 MG/1
4 TABLET ORAL NIGHTLY
Status: DISCONTINUED | OUTPATIENT
Start: 2019-08-12 | End: 2019-08-13

## 2019-08-12 RX ORDER — HYDRALAZINE HYDROCHLORIDE 20 MG/ML
10 INJECTION INTRAMUSCULAR; INTRAVENOUS EVERY 6 HOURS PRN
Status: DISCONTINUED | OUTPATIENT
Start: 2019-08-12 | End: 2019-08-13 | Stop reason: HOSPADM

## 2019-08-12 RX ORDER — NITROGLYCERIN 0.4 MG/1
0.4 TABLET SUBLINGUAL ONCE
Status: COMPLETED | OUTPATIENT
Start: 2019-08-12 | End: 2019-08-12

## 2019-08-12 RX ORDER — ASPIRIN 81 MG/1
81 TABLET, CHEWABLE ORAL DAILY
COMMUNITY
End: 2020-08-18 | Stop reason: CLARIF

## 2019-08-12 RX ORDER — SODIUM CHLORIDE 9 MG/ML
INJECTION, SOLUTION INTRAVENOUS CONTINUOUS
Status: DISCONTINUED | OUTPATIENT
Start: 2019-08-12 | End: 2019-08-13

## 2019-08-12 RX ADMIN — AMLODIPINE BESYLATE 10 MG: 5 TABLET ORAL at 01:36

## 2019-08-12 RX ADMIN — IOPAMIDOL 75 ML: 755 INJECTION, SOLUTION INTRAVENOUS at 15:19

## 2019-08-12 RX ADMIN — DOXAZOSIN 4 MG: 4 TABLET ORAL at 20:12

## 2019-08-12 RX ADMIN — ROSUVASTATIN CALCIUM 10 MG: 10 TABLET, FILM COATED ORAL at 01:36

## 2019-08-12 RX ADMIN — AMLODIPINE BESYLATE 10 MG: 5 TABLET ORAL at 20:12

## 2019-08-12 RX ADMIN — ENOXAPARIN SODIUM 100 MG: 100 INJECTION SUBCUTANEOUS at 01:39

## 2019-08-12 RX ADMIN — NITROGLYCERIN 0.4 MG: 0.4 TABLET, ORALLY DISINTEGRATING SUBLINGUAL at 01:38

## 2019-08-12 RX ADMIN — ROSUVASTATIN CALCIUM 10 MG: 10 TABLET, FILM COATED ORAL at 20:12

## 2019-08-12 RX ADMIN — Medication 10 ML: at 07:39

## 2019-08-12 RX ADMIN — TETROFOSMIN 10 MILLICURIE: 1.38 INJECTION, POWDER, LYOPHILIZED, FOR SOLUTION INTRAVENOUS at 13:29

## 2019-08-12 RX ADMIN — SODIUM CHLORIDE: 9 INJECTION, SOLUTION INTRAVENOUS at 15:44

## 2019-08-12 RX ADMIN — TETROFOSMIN 30 MILLICURIE: 1.38 INJECTION, POWDER, LYOPHILIZED, FOR SOLUTION INTRAVENOUS at 14:26

## 2019-08-12 ASSESSMENT — PAIN SCALES - GENERAL
PAINLEVEL_OUTOF10: 0

## 2019-08-12 ASSESSMENT — PAIN DESCRIPTION - FREQUENCY: FREQUENCY: INTERMITTENT

## 2019-08-12 ASSESSMENT — PAIN DESCRIPTION - LOCATION: LOCATION: CHEST

## 2019-08-12 ASSESSMENT — PAIN DESCRIPTION - ORIENTATION: ORIENTATION: LEFT;ANTERIOR

## 2019-08-12 ASSESSMENT — PAIN DESCRIPTION - DESCRIPTORS: DESCRIPTORS: PRESSURE

## 2019-08-12 ASSESSMENT — PAIN DESCRIPTION - ONSET: ONSET: ON-GOING

## 2019-08-12 NOTE — ED NOTES
Pt denies any chest pain at present. No work of breathing noted; resps easy. Report attempted to be called to CVU. Nurse to call back.      Mariah José Post, RN  08/11/19 4477

## 2019-08-12 NOTE — ED PROVIDER NOTES
[address] [facility]  EMERGENCY DEPARTMENTENCOUNTER      Pt Name: Stacie Blanco  MRN: [id number]  [place] 1965  Date ofevaluation: 8/11/2019  Provider: Beatris Virgen MD    CHIEF COMPLAINT       Chief Complaint   Patient presents with    Chest Pain     pt reports chest pain, blood pressure issues, left arm tingling. patient reports he stopped taking his hydrochlorithiazide because he felt like it was dehydrating him. lightheaded. recently returned from [place] where evaluated for the same         HISTORY OF PRESENT ILLNESS   (Location/Symptom, Timing/Onset,Context/Setting, Quality, Duration, Modifying Factors, Severity)  Note limiting factors. Stacie Blanco is a 62 y.o. male who presents to the emergency department with chest pain. The patient presents with substernal chest pain going to his left arm with some tingling. The patient states he stopped taking his hydrochlorthiazide recently as well. The patient complained of lightheadedness. The patient states the pain started around noon today and has been on and off for a long period    HPI    NursingNotes were reviewed. REVIEW OF SYSTEMS    (2-9 systems for level 4, 10 or more for level 5)     Review of Systems     Constitutional: Negative for fever. HENT: Negative for rhinorrhea and sore throat. Eyes: Negative for redness. Respiratory: Negative for shortness of breath. Cardiovascular: Negative for chest pain. Gastrointestinal: Negative for abdominal pain. Genitourinary: Negative for flank pain. Neurological: Negative for headaches. Hematological: Negative for adenopathy. Psychiatric/Behavioral: Negative for confusion. Unless otherwise stated on exam    Physical Exam:      General Appearance:  Alert, cooperative, no distress, appears stated age. Head:  Normocephalic, without obvious abnormality, atraumatic. Eyes:  conjunctiva/corneas clear, EOM's intact. Sclera anicteric.    ENT: Mucous below    Interpretation per the Radiologist below, if available at the time ofthis note: All incidental findings were discussed with the patient. XR CHEST STANDARD (2 VW)   Final Result   No acute cardiopulmonary disease. ED BEDSIDE ULTRASOUND:   Performed by ED Physician - none    LABS:  Labs Reviewed   BASIC METABOLIC PANEL - Abnormal; Notable for the following components:       Result Value    Glucose 102 (*)     BUN 23 (*)     CREATININE 1.5 (*)     GFR Non- 48 (*)     GFR  61 (*)     All other components within normal limits    Narrative:     Performed at:  [facility]  [address], [place]   Phone [phone]   CBC WITH AUTO DIFFERENTIAL    Narrative:     Performed at:  [facility] [address] [facility] [place]   Phone [phone]   TROPONIN    Narrative:     Performed at:  [facility]  [address] [facility] [place]   Phone [phone]       All other labs were within normal range or not returned as of this dictation. EMERGENCY DEPARTMENT COURSE and DIFFERENTIAL DIAGNOSIS/MDM:   Vitals:    Vitals:    08/11/19 1849 08/11/19 2120 08/11/19 2140   BP: (!) 183/107 (!) 146/86 (!) 148/86   Pulse: 66 58 58   Resp: 12 11 14   Temp: 99 °F (37.2 °C)     SpO2: 97% 96% 98%   Weight: 239 lb 7 oz (108.6 kg)         The patient has remained stable throughout his hospital course. The patient's work-up was unremarkable and negative. He scores around a 4 or 5 on his heart score depending on how suspicious his chest pain is. He is currently chest pain-free. I will be recommended admission for further care. MDM      REASSESSMENT              CONSULTS:  IP CONSULT TO HOSPITALIST    PROCEDURES:  Unless otherwise noted below, none     Procedures    FINAL IMPRESSION      1.  Chest pain, unspecified type          DISPOSITION/PLAN

## 2019-08-12 NOTE — PROGRESS NOTES
Hospitalist Progress Note      PCP: Ezequiel Ricci MD    Date of Admission: 8/11/2019    Chief Complaint: Chest Pain      Subjective:   Chest pain improving. Denies dyspnea or diaphoresis. Medications:  Reviewed    Infusion Medications   Scheduled Medications    rosuvastatin  10 mg Oral Nightly    doxazosin  4 mg Oral Nightly    amLODIPine  10 mg Oral Daily    sodium chloride flush  10 mL Intravenous 2 times per day     PRN Meds: hydrALAZINE, sodium chloride flush, ondansetron      Intake/Output Summary (Last 24 hours) at 8/12/2019 1230  Last data filed at 8/12/2019 0136  Gross per 24 hour   Intake 470 ml   Output --   Net 470 ml       Exam:    BP (!) 156/86   Pulse 67   Temp 97.8 °F (36.6 °C) (Temporal)   Resp 16   Ht 6' (1.829 m)   Wt 234 lb 5.6 oz (106.3 kg)   SpO2 98%   BMI 31.78 kg/m²     Gen/overall appearance: Not in acute distress. Alert. Head: Normocephalic, atraumatic  Eyes: EOMI, no scleral icterus  CVS: regular rate and rhythm, Normal S1S2  Pulm: Clear to auscultation bilaterally. No crackles/wheezes  Gastrointestinal: Soft, nontender, nondistended, no guarding or rebound  Extremities: No edema. No erythema or warmth  Neuro: No gross focal deficits noted  Skin: Warm, dry    Labs:   Recent Labs     08/11/19  1837   WBC 7.7   HGB 15.2   HCT 44.2        Recent Labs     08/11/19  1837      K 4.1      CO2 25   BUN 23*   CREATININE 1.5*   CALCIUM 9.6     No results for input(s): AST, ALT, BILIDIR, BILITOT, ALKPHOS in the last 72 hours. No results for input(s): INR in the last 72 hours. Recent Labs     08/11/19  1837 08/12/19  0145 08/12/19  0630   TROPONINI <0.01 <0.01 <0.01       Assessment/Plan:    Active Hospital Problems    Diagnosis Date Noted    Chest pain [R07.9] 08/11/2019     Atypical chest pain. Serial troponins negative for ACS. Rule out CSA  - pending stress test today  - cardiology following  - ASA and statin     TAYLOR; likely prerenal azotemia. Recently increased dose of HCTZ  - gentle IVF Hydration  - trend Cr and lytes  - avoid nephrotoxins  - encouraged PO hydration      Hypertension:   - continue amlodipine   - prn hydralazine     Dyslipidemia:   - c/w statin     Tobacco use:   - quit smoking 2 years ago     DVT Prophylaxis: lovenox  Diet: Diet NPO Effective Now  Code Status: Full Code    Eduardo Hernandez MD

## 2019-08-13 ENCOUNTER — TELEPHONE (OUTPATIENT)
Dept: FAMILY MEDICINE CLINIC | Age: 54
End: 2019-08-13

## 2019-08-13 VITALS
DIASTOLIC BLOOD PRESSURE: 70 MMHG | SYSTOLIC BLOOD PRESSURE: 142 MMHG | RESPIRATION RATE: 20 BRPM | OXYGEN SATURATION: 97 % | BODY MASS INDEX: 32.85 KG/M2 | HEIGHT: 72 IN | HEART RATE: 70 BPM | TEMPERATURE: 97.8 F | WEIGHT: 242.51 LBS

## 2019-08-13 LAB
ANION GAP SERPL CALCULATED.3IONS-SCNC: 9 MMOL/L (ref 3–16)
BUN BLDV-MCNC: 17 MG/DL (ref 7–20)
CALCIUM SERPL-MCNC: 8.9 MG/DL (ref 8.3–10.6)
CHLORIDE BLD-SCNC: 107 MMOL/L (ref 99–110)
CO2: 24 MMOL/L (ref 21–32)
CREAT SERPL-MCNC: 1.3 MG/DL (ref 0.9–1.3)
GFR AFRICAN AMERICAN: >60
GFR NON-AFRICAN AMERICAN: 57
GLUCOSE BLD-MCNC: 106 MG/DL (ref 70–99)
HCT VFR BLD CALC: 40.9 % (ref 40.5–52.5)
HEMOGLOBIN: 14 G/DL (ref 13.5–17.5)
MCH RBC QN AUTO: 30.8 PG (ref 26–34)
MCHC RBC AUTO-ENTMCNC: 34.2 G/DL (ref 31–36)
MCV RBC AUTO: 90.2 FL (ref 80–100)
PDW BLD-RTO: 13.8 % (ref 12.4–15.4)
PLATELET # BLD: 195 K/UL (ref 135–450)
PMV BLD AUTO: 7.3 FL (ref 5–10.5)
POTASSIUM SERPL-SCNC: 4.1 MMOL/L (ref 3.5–5.1)
RBC # BLD: 4.54 M/UL (ref 4.2–5.9)
SODIUM BLD-SCNC: 140 MMOL/L (ref 136–145)
WBC # BLD: 6.3 K/UL (ref 4–11)

## 2019-08-13 PROCEDURE — 2580000003 HC RX 258: Performed by: INTERNAL MEDICINE

## 2019-08-13 PROCEDURE — G0378 HOSPITAL OBSERVATION PER HR: HCPCS

## 2019-08-13 PROCEDURE — 99226 PR SBSQ OBSERVATION CARE/DAY 35 MINUTES: CPT | Performed by: INTERNAL MEDICINE

## 2019-08-13 PROCEDURE — 85027 COMPLETE CBC AUTOMATED: CPT

## 2019-08-13 PROCEDURE — 96361 HYDRATE IV INFUSION ADD-ON: CPT

## 2019-08-13 PROCEDURE — 80048 BASIC METABOLIC PNL TOTAL CA: CPT

## 2019-08-13 RX ORDER — DOXAZOSIN MESYLATE 1 MG/1
1 TABLET ORAL NIGHTLY
Status: DISCONTINUED | OUTPATIENT
Start: 2019-08-13 | End: 2019-08-13 | Stop reason: HOSPADM

## 2019-08-13 RX ORDER — DOXAZOSIN MESYLATE 1 MG/1
1 TABLET ORAL NIGHTLY
Qty: 30 TABLET | Refills: 3 | Status: SHIPPED | OUTPATIENT
Start: 2019-08-13 | End: 2019-08-27 | Stop reason: SDUPTHER

## 2019-08-13 RX ADMIN — SODIUM CHLORIDE: 9 INJECTION, SOLUTION INTRAVENOUS at 00:03

## 2019-08-13 ASSESSMENT — PAIN SCALES - GENERAL
PAINLEVEL_OUTOF10: 0
PAINLEVEL_OUTOF10: 0

## 2019-08-13 NOTE — DISCHARGE SUMMARY
Hospital Medicine Discharge Summary    Patient ID: Mirtha Reveles      Patient's PCP: Nickolas Gonzalez MD    Admit Date: 8/11/2019     Discharge Date: 8/13/2019    Admitting Physician: José Miguel Elias MD     Discharge Physician: Christie Meyer MD     Discharge Diagnoses and Hospital Course: Active Hospital Problems    Diagnosis Date Noted    Chest pain [R07.9] 08/11/2019     Atypical chest pain. Serial troponins negative for ACS. Stress test unremarkable for reversible ischemia. - c/w ASA and statin  - close outpt follow up      TAYLOR; likely prerenal azotemia. Recently increased dose of HCTZ. Cr improved. - gentle IVF Hydration  - trend Cr and lytes  - avoid nephrotoxins  - encouraged PO hydration   - hctz discontinued     Hypertension:   - continue amlodipine   - started on cardura     Dyslipidemia:   - c/w statin     Tobacco use:   - quit smoking 2 years ago     The patient was seen and examined on day of discharge and this discharge summary is in conjunction with any daily progress note from day of discharge. Exam:     BP (!) 142/70   Pulse 70   Temp 97.8 °F (36.6 °C) (Temporal)   Resp 20   Ht 6' (1.829 m)   Wt 242 lb 8.1 oz (110 kg)   SpO2 97%   BMI 32.89 kg/m²     Gen/overall appearance: Not in acute distress. Alert. Head: Normocephalic, atraumatic  Eyes: EOMI, no scleral icterus  CVS: regular rate and rhythm, Normal S1S2  Pulm: Clear to auscultation bilaterally. No crackles/wheezes  Gastrointestinal: Soft, nontender, nondistended, no guarding or rebound  Extremities: No edema. No erythema or warmth  Neuro: No gross focal deficits noted  Skin: Warm, dry    Consults:     IP CONSULT TO HOSPITALIST  IP CONSULT TO CARDIOLOGY    Disposition:  home     Condition:  Stable    Discharge Instructions/Follow-up:   Pt to follow up with PCP within 1 week  Consultants as scheduled    Code Status:  Prior    Activity: activity as tolerated    Diet: cardiac diet    Labs:  For convenience and

## 2019-08-13 NOTE — PROGRESS NOTES
and dry    Assessment:    Patient Active Hospital Problem List:   Chest pain (8/11/2019)    Assessment: stress test reviewed ,nuclear scan normal. ECG false positive. No calcium seen in coronary arteries,no dissection    Plan:  Discharge on cardura 1 mg daily in addition to norvasc. Will see in office in 1-2 weeks. Stop HCTZ        Plan:  1.  As above     I have spent  35  minutes of face to face time with the patient with more than 50%  spent  counseling and coordinating care for Onita Bounds

## 2019-08-15 ENCOUNTER — HOSPITAL ENCOUNTER (EMERGENCY)
Age: 54
Discharge: HOME | End: 2019-08-15
Attending: EMERGENCY MEDICINE
Payer: COMMERCIAL

## 2019-08-15 VITALS
BODY MASS INDEX: 32.78 KG/M2 | WEIGHT: 242 LBS | RESPIRATION RATE: 18 BRPM | HEIGHT: 72 IN | HEART RATE: 59 BPM | OXYGEN SATURATION: 95 % | TEMPERATURE: 98.2 F | SYSTOLIC BLOOD PRESSURE: 132 MMHG | DIASTOLIC BLOOD PRESSURE: 78 MMHG

## 2019-08-15 DIAGNOSIS — I10 ASYMPTOMATIC HYPERTENSION: Primary | ICD-10-CM

## 2019-08-15 LAB
A/G RATIO: 1.6 (ref 1.1–2.2)
ALBUMIN SERPL-MCNC: 4.8 G/DL (ref 3.4–5)
ALP BLD-CCNC: 82 U/L (ref 40–129)
ALT SERPL-CCNC: 40 U/L (ref 10–40)
ANION GAP SERPL CALCULATED.3IONS-SCNC: 13 MMOL/L (ref 3–16)
AST SERPL-CCNC: 28 U/L (ref 15–37)
BASOPHILS ABSOLUTE: 0.1 K/UL (ref 0–0.2)
BASOPHILS RELATIVE PERCENT: 0.8 %
BILIRUB SERPL-MCNC: 0.5 MG/DL (ref 0–1)
BUN BLDV-MCNC: 18 MG/DL (ref 7–20)
CALCIUM SERPL-MCNC: 9.5 MG/DL (ref 8.3–10.6)
CHLORIDE BLD-SCNC: 105 MMOL/L (ref 99–110)
CO2: 21 MMOL/L (ref 21–32)
CREAT SERPL-MCNC: 1.1 MG/DL (ref 0.9–1.3)
EKG ATRIAL RATE: 65 BPM
EKG DIAGNOSIS: NORMAL
EKG P AXIS: 44 DEGREES
EKG P-R INTERVAL: 146 MS
EKG Q-T INTERVAL: 406 MS
EKG QRS DURATION: 90 MS
EKG QTC CALCULATION (BAZETT): 422 MS
EKG R AXIS: 27 DEGREES
EKG T AXIS: 43 DEGREES
EKG VENTRICULAR RATE: 65 BPM
EOSINOPHILS ABSOLUTE: 0.1 K/UL (ref 0–0.6)
EOSINOPHILS RELATIVE PERCENT: 1.6 %
GFR AFRICAN AMERICAN: >60
GFR NON-AFRICAN AMERICAN: >60
GLOBULIN: 3 G/DL
GLUCOSE BLD-MCNC: 111 MG/DL (ref 70–99)
HCT VFR BLD CALC: 45.1 % (ref 40.5–52.5)
HEMOGLOBIN: 15.5 G/DL (ref 13.5–17.5)
LYMPHOCYTES ABSOLUTE: 1.6 K/UL (ref 1–5.1)
LYMPHOCYTES RELATIVE PERCENT: 24.9 %
MCH RBC QN AUTO: 31 PG (ref 26–34)
MCHC RBC AUTO-ENTMCNC: 34.4 G/DL (ref 31–36)
MCV RBC AUTO: 90.1 FL (ref 80–100)
MONOCYTES ABSOLUTE: 0.5 K/UL (ref 0–1.3)
MONOCYTES RELATIVE PERCENT: 8.4 %
NEUTROPHILS ABSOLUTE: 4.1 K/UL (ref 1.7–7.7)
NEUTROPHILS RELATIVE PERCENT: 64.3 %
PDW BLD-RTO: 13.6 % (ref 12.4–15.4)
PLATELET # BLD: 210 K/UL (ref 135–450)
PMV BLD AUTO: 7.2 FL (ref 5–10.5)
POTASSIUM SERPL-SCNC: 4.3 MMOL/L (ref 3.5–5.1)
RBC # BLD: 5.01 M/UL (ref 4.2–5.9)
SODIUM BLD-SCNC: 139 MMOL/L (ref 136–145)
TOTAL PROTEIN: 7.8 G/DL (ref 6.4–8.2)
TROPONIN: <0.01 NG/ML
WBC # BLD: 6.4 K/UL (ref 4–11)

## 2019-08-15 PROCEDURE — 93010 ELECTROCARDIOGRAM REPORT: CPT | Performed by: INTERNAL MEDICINE

## 2019-08-15 PROCEDURE — 99283 EMERGENCY DEPT VISIT LOW MDM: CPT

## 2019-08-15 PROCEDURE — 6370000000 HC RX 637 (ALT 250 FOR IP): Performed by: PHYSICIAN ASSISTANT

## 2019-08-15 PROCEDURE — 84484 ASSAY OF TROPONIN QUANT: CPT

## 2019-08-15 PROCEDURE — 93005 ELECTROCARDIOGRAM TRACING: CPT | Performed by: PHYSICIAN ASSISTANT

## 2019-08-15 PROCEDURE — 80053 COMPREHEN METABOLIC PANEL: CPT

## 2019-08-15 PROCEDURE — 85025 COMPLETE CBC W/AUTO DIFF WBC: CPT

## 2019-08-15 RX ORDER — DOXAZOSIN MESYLATE 1 MG/1
1 TABLET ORAL ONCE
Status: COMPLETED | OUTPATIENT
Start: 2019-08-15 | End: 2019-08-15

## 2019-08-15 RX ADMIN — DOXAZOSIN 1 MG: 1 TABLET ORAL at 12:24

## 2019-08-15 ASSESSMENT — ENCOUNTER SYMPTOMS
VOMITING: 0
DIARRHEA: 0
BACK PAIN: 0
ABDOMINAL PAIN: 0
SHORTNESS OF BREATH: 0
BLOOD IN STOOL: 0

## 2019-08-15 NOTE — ED PROVIDER NOTES
Edgar Breaux,   08/15/19 8469
Resting HR:60 bpm  Resting BP:157/98 mmHg  Stress Protocol:Exercise - Juan  Peak HR:139 bpm                            HR/BP product:13949  Peak BP:216/65 mmHg                        Max exercise: 10 METS  Predicted HR: 164 bpm  % of predicted HR: 85  Test duration:7 min and 23 sec  Reason for termination:Physiologic Maximum   Symptoms  There was stress induced shortness of breath and fatigue. Symptoms resolved with rest.   Complications  Procedure complication was none. Imaging Protocols   - One Day   Rest                          Stress   Isotope:Myoview/Tetrofosmin   Isotope: Myoview/Tetrofosmin  Isotope dose:9.82 mCi         Isotope dose:33.13 mCi  Administration Route:I.V. Administration Route:I.V.  Date:08/12/2019 13:15         Date:08/12/2019 14:25                                 Technique:      Gated  Imaging Results    Stress ejection    Ejection fraction:64 %    EDV :99 ml    ESV :36 ml    Stroke volume :63 ml    LV mass :137 gr  Medical History  Signatures   ------------------------------------------------------------------  Electronically signed by Morena Church MD, [facility] [place] (Interpreting  physician) on 08/12/2019 at 15:36  ------------------------------------------------------------------            PROCEDURES   Unless otherwise noted below, none     Procedures    CRITICAL CARE TIME   N/A    CONSULTS: Dr. Clarissa Berg and DIFFERENTIAL DIAGNOSIS/MDM:   Vitals:    Vitals:    08/15/19 1130 08/15/19 1145 08/15/19 1200 08/15/19 1215   BP: (!) 147/79 135/73 (!) 147/81 132/78   Pulse: 63 61 66 59   Resp: 18      Temp: 98.2 °F (36.8 °C)      TempSrc:       SpO2: 97% 97% 94% 95%   Weight:       Height:           Patient was given thefollowing medications:  Medications   doxazosin (CARDURA) tablet 1 mg (1 mg Oral Given 8/15/19 8576)       Patient presented with high blood pressure. Really not having too many symptoms from this.   EKG is unchanged laboratory

## 2019-08-15 NOTE — ED NOTES
Pt into ER from home where he was recently discharged from here where he spent 2 days doing echo and stress test, patient reports he was instructed to continue to monitor blood pressure and it was high this morning so he came back, worried about hypertension crisis. IV placed, blood sent to Lab, and placed on monitor. EKG completed. VSS at this time, will continue to monitor.       Genia Martins RN  08/15/19 0501

## 2019-08-27 ENCOUNTER — HOSPITAL ENCOUNTER (OUTPATIENT)
Age: 54
Discharge: HOME | End: 2019-08-27
Payer: COMMERCIAL

## 2019-08-27 ENCOUNTER — OFFICE VISIT (OUTPATIENT)
Dept: CARDIOLOGY CLINIC | Age: 54
End: 2019-08-27
Payer: COMMERCIAL

## 2019-08-27 VITALS
BODY MASS INDEX: 32.64 KG/M2 | DIASTOLIC BLOOD PRESSURE: 90 MMHG | WEIGHT: 241 LBS | HEIGHT: 72 IN | HEART RATE: 70 BPM | OXYGEN SATURATION: 96 % | SYSTOLIC BLOOD PRESSURE: 150 MMHG

## 2019-08-27 DIAGNOSIS — I10 ESSENTIAL HYPERTENSION: ICD-10-CM

## 2019-08-27 DIAGNOSIS — I10 ESSENTIAL HYPERTENSION: Primary | ICD-10-CM

## 2019-08-27 DIAGNOSIS — R79.89 LOW TESTOSTERONE IN MALE: ICD-10-CM

## 2019-08-27 DIAGNOSIS — Z13.1 SCREENING FOR DIABETES MELLITUS: ICD-10-CM

## 2019-08-27 DIAGNOSIS — Z12.5 SCREENING FOR PROSTATE CANCER: ICD-10-CM

## 2019-08-27 DIAGNOSIS — E78.2 MIXED HYPERLIPIDEMIA: ICD-10-CM

## 2019-08-27 LAB
A/G RATIO: 1.9 (ref 1.1–2.2)
ALBUMIN SERPL-MCNC: 4.7 G/DL (ref 3.4–5)
ALP BLD-CCNC: 80 U/L (ref 40–129)
ALT SERPL-CCNC: 25 U/L (ref 10–40)
ANION GAP SERPL CALCULATED.3IONS-SCNC: 15 MMOL/L (ref 3–16)
AST SERPL-CCNC: 23 U/L (ref 15–37)
BASOPHILS ABSOLUTE: 0 K/UL (ref 0–0.2)
BASOPHILS RELATIVE PERCENT: 0.7 %
BILIRUB SERPL-MCNC: 0.5 MG/DL (ref 0–1)
BUN BLDV-MCNC: 15 MG/DL (ref 7–20)
CALCIUM SERPL-MCNC: 9.7 MG/DL (ref 8.3–10.6)
CHLORIDE BLD-SCNC: 101 MMOL/L (ref 99–110)
CHOLESTEROL, FASTING: 180 MG/DL (ref 0–199)
CO2: 25 MMOL/L (ref 21–32)
CREAT SERPL-MCNC: 1.2 MG/DL (ref 0.9–1.3)
EOSINOPHILS ABSOLUTE: 0.2 K/UL (ref 0–0.6)
EOSINOPHILS RELATIVE PERCENT: 2.8 %
GFR AFRICAN AMERICAN: >60
GFR NON-AFRICAN AMERICAN: >60
GLOBULIN: 2.5 G/DL
GLUCOSE FASTING: 107 MG/DL (ref 70–99)
HCT VFR BLD CALC: 42.7 % (ref 40.5–52.5)
HDLC SERPL-MCNC: 36 MG/DL (ref 40–60)
HEMOGLOBIN: 14.8 G/DL (ref 13.5–17.5)
LDL CHOLESTEROL CALCULATED: ABNORMAL MG/DL
LDL CHOLESTEROL DIRECT: 105 MG/DL
LYMPHOCYTES ABSOLUTE: 1.8 K/UL (ref 1–5.1)
LYMPHOCYTES RELATIVE PERCENT: 26.9 %
MCH RBC QN AUTO: 31.1 PG (ref 26–34)
MCHC RBC AUTO-ENTMCNC: 34.7 G/DL (ref 31–36)
MCV RBC AUTO: 89.7 FL (ref 80–100)
MONOCYTES ABSOLUTE: 0.6 K/UL (ref 0–1.3)
MONOCYTES RELATIVE PERCENT: 9.5 %
NEUTROPHILS ABSOLUTE: 4 K/UL (ref 1.7–7.7)
NEUTROPHILS RELATIVE PERCENT: 60.1 %
PDW BLD-RTO: 13.6 % (ref 12.4–15.4)
PLATELET # BLD: 242 K/UL (ref 135–450)
PMV BLD AUTO: 7.7 FL (ref 5–10.5)
POTASSIUM SERPL-SCNC: 4.4 MMOL/L (ref 3.5–5.1)
RBC # BLD: 4.77 M/UL (ref 4.2–5.9)
SODIUM BLD-SCNC: 141 MMOL/L (ref 136–145)
TOTAL PROTEIN: 7.2 G/DL (ref 6.4–8.2)
TRIGLYCERIDE, FASTING: 390 MG/DL (ref 0–150)
TSH REFLEX FT4: 0.85 UIU/ML (ref 0.27–4.2)
VLDLC SERPL CALC-MCNC: ABNORMAL MG/DL
WBC # BLD: 6.6 K/UL (ref 4–11)

## 2019-08-27 PROCEDURE — 85025 COMPLETE CBC W/AUTO DIFF WBC: CPT

## 2019-08-27 PROCEDURE — 99214 OFFICE O/P EST MOD 30 MIN: CPT | Performed by: NURSE PRACTITIONER

## 2019-08-27 PROCEDURE — G8427 DOCREV CUR MEDS BY ELIG CLIN: HCPCS | Performed by: NURSE PRACTITIONER

## 2019-08-27 PROCEDURE — 84443 ASSAY THYROID STIM HORMONE: CPT

## 2019-08-27 PROCEDURE — 3017F COLORECTAL CA SCREEN DOC REV: CPT | Performed by: NURSE PRACTITIONER

## 2019-08-27 PROCEDURE — 84270 ASSAY OF SEX HORMONE GLOBUL: CPT

## 2019-08-27 PROCEDURE — 83036 HEMOGLOBIN GLYCOSYLATED A1C: CPT

## 2019-08-27 PROCEDURE — G8417 CALC BMI ABV UP PARAM F/U: HCPCS | Performed by: NURSE PRACTITIONER

## 2019-08-27 PROCEDURE — 84153 ASSAY OF PSA TOTAL: CPT

## 2019-08-27 PROCEDURE — 80053 COMPREHEN METABOLIC PANEL: CPT

## 2019-08-27 PROCEDURE — 80061 LIPID PANEL: CPT

## 2019-08-27 PROCEDURE — 1036F TOBACCO NON-USER: CPT | Performed by: NURSE PRACTITIONER

## 2019-08-27 PROCEDURE — 36415 COLL VENOUS BLD VENIPUNCTURE: CPT

## 2019-08-27 PROCEDURE — 84403 ASSAY OF TOTAL TESTOSTERONE: CPT

## 2019-08-27 PROCEDURE — 84154 ASSAY OF PSA FREE: CPT

## 2019-08-27 RX ORDER — DOXAZOSIN MESYLATE 1 MG/1
2 TABLET ORAL NIGHTLY
Qty: 90 TABLET | Refills: 3 | Status: SHIPPED | OUTPATIENT
Start: 2019-08-27 | End: 2020-08-18 | Stop reason: CLARIF

## 2019-08-27 RX ORDER — SIMVASTATIN 40 MG
40 TABLET ORAL NIGHTLY
Qty: 90 TABLET | Refills: 3 | Status: SHIPPED | OUTPATIENT
Start: 2019-08-27 | End: 2020-11-11

## 2019-08-27 RX ORDER — AMLODIPINE BESYLATE 10 MG/1
TABLET ORAL
Qty: 90 TABLET | Refills: 3 | Status: SHIPPED | OUTPATIENT
Start: 2019-08-27 | End: 2020-09-11

## 2019-08-27 NOTE — LETTER
LUNGS:  No increased work of breathing and clear to auscultation, no crackles or wheezing  CARDIOVASCULAR:  Regular rate and rhythm with no murmurs, gallops, rubs, or abnormal heart sounds, normal PMI. The apical impulses not displaced  JVP less than 8 cm H2O  Heart tones are crisp and normal  Cervical veins are not engorged  The carotid upstroke is normal in amplitude and contour without delay or bruit  JVP is not elevated  ABDOMEN:  Normal bowel sounds, non-distended and non-tender to palpation  EXT: No edema, no calf tenderness. Pulses are present bilaterally. DATA:    Lab Results   Component Value Date    ALT 40 08/15/2019    AST 28 08/15/2019    ALKPHOS 82 08/15/2019    BILITOT 0.5 08/15/2019     Lab Results   Component Value Date    CREATININE 1.1 08/15/2019    BUN 18 08/15/2019     08/15/2019    K 4.3 08/15/2019     08/15/2019    CO2 21 08/15/2019     Lab Results   Component Value Date    TSH 1.46 2018     Lab Results   Component Value Date    WBC 6.4 08/15/2019    HGB 15.5 08/15/2019    HCT 45.1 08/15/2019    MCV 90.1 08/15/2019     08/15/2019     No components found for: CHLPL  Lab Results   Component Value Date    TRIG 265 (H) 2018    TRIG 463 (H) 2018    TRIG 473 (H) 2015     Lab Results   Component Value Date    HDL 37 (L) 2018    HDL 31 (L) 2018    HDL 29 (L) 2015     Lab Results   Component Value Date    LDLCALC 89 2018    [address] see below 2018    LDLCALC see below 2015     Lab Results   Component Value Date    LABVLDL 53 2018    LABVLDL see below 2018    LABVLDL see below 2015     Radiology Review:  Pertinent images / reports were reviewed as a part of this visit and reveals the followin/19  Stress Test       Summary    There is normal isotope uptake at stress and rest. There is no evidence of    myocardial ischemia or scar.    Normal LV function.

## 2019-08-27 NOTE — PROGRESS NOTES
not elevated  ABDOMEN:  Normal bowel sounds, non-distended and non-tender to palpation  EXT: No edema, no calf tenderness. Pulses are present bilaterally. DATA:    Lab Results   Component Value Date    ALT 40 08/15/2019    AST 28 08/15/2019    ALKPHOS 82 08/15/2019    BILITOT 0.5 08/15/2019     Lab Results   Component Value Date    CREATININE 1.1 08/15/2019    BUN 18 08/15/2019     08/15/2019    K 4.3 08/15/2019     08/15/2019    CO2 21 08/15/2019     Lab Results   Component Value Date    TSH 1.46 2018     Lab Results   Component Value Date    WBC 6.4 08/15/2019    HGB 15.5 08/15/2019    HCT 45.1 08/15/2019    MCV 90.1 08/15/2019     08/15/2019     No components found for: CHLPL  Lab Results   Component Value Date    TRIG 265 (H) 2018    TRIG 463 (H) 2018    TRIG 473 (H) 2015     Lab Results   Component Value Date    HDL 37 (L) 2018    HDL 31 (L) 2018    HDL 29 (L) 2015     Lab Results   Component Value Date    LDLCALC 89 2018    [address] see below 2018    LDLCALC see below 2015     Lab Results   Component Value Date    LABVLDL 53 2018    LABVLDL see below 2018    LABVLDL see below 2015     Radiology Review:  Pertinent images / reports were reviewed as a part of this visit and reveals the followin/19  Stress Test       Summary    There is normal isotope uptake at stress and rest. There is no evidence of    myocardial ischemia or scar.    Normal LV function.     HTN with exagerated BP response to exercise    Overall findings represent a intermediate risk scan due to abnormal ECG    response to exercise.             CTA:  No acute abnormalities of the chest.  No evidence of aneurysm dissection or   acute arterial abnormality.  No obvious central pulmonary emboli.  No active   lung parenchyma or pleural disease.  Incidental fatty infiltration of the   liver.  Right nephrolithiasis but no obstructive uropathy

## 2019-08-28 LAB
ESTIMATED AVERAGE GLUCOSE: 108.3 MG/DL
HBA1C MFR BLD: 5.4 %

## 2019-08-29 LAB
PROSTATE SPECIFIC ANTIGEN FREE: 0.6 UG/L
PROSTATE SPECIFIC ANTIGEN PERCENT FREE: 33.3 %
PROSTATE SPECIFIC ANTIGEN: 1.8 UG/L (ref 0–4)
SEX HORMONE BINDING GLOBULIN: 16 NMOL/L (ref 11–80)
TESTOSTERONE FREE-NONMALE: 66.7 PG/ML (ref 47–244)
TESTOSTERONE TOTAL: 243 NG/DL (ref 220–1000)

## 2019-10-14 ENCOUNTER — OFFICE VISIT (OUTPATIENT)
Dept: FAMILY MEDICINE CLINIC | Age: 54
End: 2019-10-14
Payer: COMMERCIAL

## 2019-10-14 VITALS
OXYGEN SATURATION: 97 % | BODY MASS INDEX: 33.39 KG/M2 | DIASTOLIC BLOOD PRESSURE: 90 MMHG | WEIGHT: 246.2 LBS | HEART RATE: 68 BPM | SYSTOLIC BLOOD PRESSURE: 160 MMHG

## 2019-10-14 DIAGNOSIS — Z71.2 ENCOUNTER TO DISCUSS TEST RESULTS: ICD-10-CM

## 2019-10-14 DIAGNOSIS — Z87.891 EX-SMOKER: ICD-10-CM

## 2019-10-14 DIAGNOSIS — R79.89 LOW TESTOSTERONE IN MALE: ICD-10-CM

## 2019-10-14 DIAGNOSIS — E78.2 MIXED HYPERLIPIDEMIA: ICD-10-CM

## 2019-10-14 DIAGNOSIS — Z00.00 PHYSICAL EXAM, ROUTINE: Primary | ICD-10-CM

## 2019-10-14 DIAGNOSIS — I10 ESSENTIAL HYPERTENSION: ICD-10-CM

## 2019-10-14 PROCEDURE — 99396 PREV VISIT EST AGE 40-64: CPT | Performed by: FAMILY MEDICINE

## 2019-10-14 PROCEDURE — G8484 FLU IMMUNIZE NO ADMIN: HCPCS | Performed by: FAMILY MEDICINE

## 2019-10-14 RX ORDER — DOXAZOSIN MESYLATE 4 MG/1
4 TABLET ORAL DAILY
Qty: 90 TABLET | Refills: 3 | Status: SHIPPED | OUTPATIENT
Start: 2019-10-14 | End: 2020-12-10

## 2019-10-14 RX ORDER — BLOOD PRESSURE TEST KIT
KIT MISCELLANEOUS
Qty: 1 KIT | Refills: 0 | Status: SHIPPED | OUTPATIENT
Start: 2019-10-14

## 2019-10-14 RX ORDER — SULFACETAMIDE SODIUM 100 MG/ML
2 SOLUTION/ DROPS OPHTHALMIC 4 TIMES DAILY
Qty: 1 BOTTLE | Refills: 0 | Status: SHIPPED | OUTPATIENT
Start: 2019-10-14 | End: 2020-12-15 | Stop reason: SDUPTHER

## 2019-10-14 ASSESSMENT — VISUAL ACUITY
OS_CC: 20/20
OD_CC: 20/20

## 2019-10-25 ENCOUNTER — OFFICE VISIT (OUTPATIENT)
Dept: FAMILY MEDICINE CLINIC | Age: 54
End: 2019-10-25
Payer: COMMERCIAL

## 2019-10-25 VITALS
DIASTOLIC BLOOD PRESSURE: 80 MMHG | BODY MASS INDEX: 33.32 KG/M2 | SYSTOLIC BLOOD PRESSURE: 152 MMHG | HEART RATE: 64 BPM | WEIGHT: 246 LBS | HEIGHT: 72 IN | OXYGEN SATURATION: 97 %

## 2019-10-25 DIAGNOSIS — I10 ESSENTIAL HYPERTENSION: ICD-10-CM

## 2019-10-25 PROCEDURE — 1036F TOBACCO NON-USER: CPT | Performed by: FAMILY MEDICINE

## 2019-10-25 PROCEDURE — 3017F COLORECTAL CA SCREEN DOC REV: CPT | Performed by: FAMILY MEDICINE

## 2019-10-25 PROCEDURE — G8484 FLU IMMUNIZE NO ADMIN: HCPCS | Performed by: FAMILY MEDICINE

## 2019-10-25 PROCEDURE — G8417 CALC BMI ABV UP PARAM F/U: HCPCS | Performed by: FAMILY MEDICINE

## 2019-10-25 PROCEDURE — G8427 DOCREV CUR MEDS BY ELIG CLIN: HCPCS | Performed by: FAMILY MEDICINE

## 2019-10-25 PROCEDURE — 99213 OFFICE O/P EST LOW 20 MIN: CPT | Performed by: FAMILY MEDICINE

## 2019-10-25 RX ORDER — LOSARTAN POTASSIUM AND HYDROCHLOROTHIAZIDE 12.5; 5 MG/1; MG/1
1 TABLET ORAL DAILY
Qty: 30 TABLET | Refills: 5 | Status: SHIPPED | OUTPATIENT
Start: 2019-10-25 | End: 2020-08-18 | Stop reason: CLARIF

## 2019-10-28 DIAGNOSIS — I10 ESSENTIAL HYPERTENSION: ICD-10-CM

## 2019-10-28 RX ORDER — HYDROCHLOROTHIAZIDE 12.5 MG/1
12.5 CAPSULE, GELATIN COATED ORAL EVERY MORNING
Qty: 90 CAPSULE | Refills: 0 | Status: SHIPPED | OUTPATIENT
Start: 2019-10-28 | End: 2020-08-18 | Stop reason: CLARIF

## 2019-10-28 RX ORDER — LOSARTAN POTASSIUM AND HYDROCHLOROTHIAZIDE 12.5; 5 MG/1; MG/1
1 TABLET ORAL DAILY
Qty: 30 TABLET | Refills: 5 | Status: CANCELLED | OUTPATIENT
Start: 2019-10-28

## 2019-10-28 RX ORDER — LOSARTAN POTASSIUM 50 MG/1
50 TABLET ORAL DAILY
Qty: 90 TABLET | Refills: 0 | Status: SHIPPED | OUTPATIENT
Start: 2019-10-28 | End: 2020-08-18 | Stop reason: CLARIF

## 2019-12-17 ENCOUNTER — OFFICE VISIT (OUTPATIENT)
Dept: FAMILY MEDICINE CLINIC | Age: 54
End: 2019-12-17
Payer: COMMERCIAL

## 2019-12-17 VITALS
WEIGHT: 252 LBS | HEART RATE: 72 BPM | BODY MASS INDEX: 34.13 KG/M2 | SYSTOLIC BLOOD PRESSURE: 138 MMHG | OXYGEN SATURATION: 98 % | DIASTOLIC BLOOD PRESSURE: 85 MMHG | HEIGHT: 72 IN

## 2019-12-17 DIAGNOSIS — I10 ESSENTIAL HYPERTENSION: Primary | ICD-10-CM

## 2019-12-17 DIAGNOSIS — E78.2 MIXED HYPERLIPIDEMIA: ICD-10-CM

## 2019-12-17 PROCEDURE — 3017F COLORECTAL CA SCREEN DOC REV: CPT | Performed by: FAMILY MEDICINE

## 2019-12-17 PROCEDURE — G8417 CALC BMI ABV UP PARAM F/U: HCPCS | Performed by: FAMILY MEDICINE

## 2019-12-17 PROCEDURE — G8427 DOCREV CUR MEDS BY ELIG CLIN: HCPCS | Performed by: FAMILY MEDICINE

## 2019-12-17 PROCEDURE — 99214 OFFICE O/P EST MOD 30 MIN: CPT | Performed by: FAMILY MEDICINE

## 2019-12-17 PROCEDURE — 1036F TOBACCO NON-USER: CPT | Performed by: FAMILY MEDICINE

## 2019-12-17 PROCEDURE — G8484 FLU IMMUNIZE NO ADMIN: HCPCS | Performed by: FAMILY MEDICINE

## 2019-12-17 RX ORDER — LOSARTAN POTASSIUM 100 MG/1
100 TABLET ORAL DAILY
Qty: 30 TABLET | Refills: 5 | Status: SHIPPED | OUTPATIENT
Start: 2019-12-17 | End: 2020-09-11

## 2019-12-27 RX ORDER — TELMISARTAN 20 MG/1
20 TABLET ORAL DAILY
Qty: 30 TABLET | Refills: 3 | Status: SHIPPED | OUTPATIENT
Start: 2019-12-27 | End: 2020-08-18 | Stop reason: CLARIF

## 2020-08-17 ENCOUNTER — NURSE TRIAGE (OUTPATIENT)
Dept: OTHER | Facility: CLINIC | Age: 55
End: 2020-08-17

## 2020-08-17 NOTE — TELEPHONE ENCOUNTER
Reason for Disposition   MODERATE longstanding difficulty breathing (e.g., speaks in phrases, SOB even at rest, pulse 100-120) and SAME as normal    Answer Assessment - Initial Assessment Questions  1. RESPIRATORY STATUS: \"Describe your breathing? \" (e.g., wheezing, shortness of breath, unable to speak, severe coughing)       *No Answer*  2. ONSET: \"When did this breathing problem begin? \"       Several months  3. PATTERN \"Does the difficult breathing come and go, or has it been constant since it started? \"       *No Answer*  4. SEVERITY: \"How bad is your breathing? \" (e.g., mild, moderate, severe)     - MILD: No SOB at rest, mild SOB with walking, speaks normally in sentences, can lay down, no retractions, pulse < 100.     - MODERATE: SOB at rest, SOB with minimal exertion and prefers to sit, cannot lie down flat, speaks in phrases, mild retractions, audible wheezing, pulse 100-120.     - SEVERE: Very SOB at rest, speaks in single words, struggling to breathe, sitting hunched forward, retractions, pulse > 120       Sob , when lying flat worse  5. RECURRENT SYMPTOM: \"Have you had difficulty breathing before? \" If so, ask: \"When was the last time? \" and \"What happened that time? \"       *No Answer*  6. CARDIAC HISTORY: \"Do you have any history of heart disease? \" (e.g., heart attack, angina, bypass surgery, angioplasty)       *No Answer*  7. LUNG HISTORY: \"Do you have any history of lung disease? \"  (e.g., pulmonary embolus, asthma, emphysema)      *No Answer*  8. CAUSE: \"What do you think is causing the breathing problem? \"       Former smoker  9. OTHER SYMPTOMS: \"Do you have any other symptoms? (e.g., dizziness, runny nose, cough, chest pain, fever)      *No Answer*  10. PREGNANCY: \"Is there any chance you are pregnant? \" \"When was your last menstrual period? \"        *No Answer*  11. TRAVEL: \"Have you traveled out of the country in the last month? \" (e.g., travel history, exposures)        *No Answer*    Protocols used:

## 2020-08-18 ENCOUNTER — OFFICE VISIT (OUTPATIENT)
Dept: FAMILY MEDICINE CLINIC | Age: 55
End: 2020-08-18
Payer: COMMERCIAL

## 2020-08-18 VITALS
HEIGHT: 72 IN | SYSTOLIC BLOOD PRESSURE: 150 MMHG | BODY MASS INDEX: 32.98 KG/M2 | TEMPERATURE: 97.5 F | HEART RATE: 63 BPM | OXYGEN SATURATION: 97 % | WEIGHT: 243.5 LBS | DIASTOLIC BLOOD PRESSURE: 94 MMHG

## 2020-08-18 PROCEDURE — G8427 DOCREV CUR MEDS BY ELIG CLIN: HCPCS | Performed by: FAMILY MEDICINE

## 2020-08-18 PROCEDURE — 99213 OFFICE O/P EST LOW 20 MIN: CPT | Performed by: FAMILY MEDICINE

## 2020-08-18 PROCEDURE — 3017F COLORECTAL CA SCREEN DOC REV: CPT | Performed by: FAMILY MEDICINE

## 2020-08-18 PROCEDURE — 94060 EVALUATION OF WHEEZING: CPT | Performed by: FAMILY MEDICINE

## 2020-08-18 PROCEDURE — G8417 CALC BMI ABV UP PARAM F/U: HCPCS | Performed by: FAMILY MEDICINE

## 2020-08-18 PROCEDURE — 1036F TOBACCO NON-USER: CPT | Performed by: FAMILY MEDICINE

## 2020-08-18 ASSESSMENT — PATIENT HEALTH QUESTIONNAIRE - PHQ9
SUM OF ALL RESPONSES TO PHQ9 QUESTIONS 1 & 2: 0
SUM OF ALL RESPONSES TO PHQ QUESTIONS 1-9: 0
SUM OF ALL RESPONSES TO PHQ QUESTIONS 1-9: 0
2. FEELING DOWN, DEPRESSED OR HOPELESS: 0
1. LITTLE INTEREST OR PLEASURE IN DOING THINGS: 0

## 2020-08-18 NOTE — PROGRESS NOTES
Katie Reyes is a 60 y.o. male. HPI:  Sob for 2 weeks, comes and goes often at rest  No chest pain  Still able to exercise  No cough, wheeze, fever  Last August had stress Myoview and chest CT done that were normal, at that time he was having chest pain and felt much worse than he does now  Does not tolerate diuretics well  Wt Readings from Last 3 Encounters:   08/18/20 243 lb 8 oz (110.5 kg)   12/17/19 252 lb (114.3 kg)   10/25/19 246 lb (111.6 kg)     Meds, vitamins and allergies reviewed with Patient    ROS:  Gen: no  fever  HEENT: no cold symptoms, sore throat. CV:  Denies chest pain or palpitations. Pulm:  Denies  cough. Abd:  Denies abdominal pain, nausea and vomiting. Skin: no rash    Allergies   Allergen Reactions    Hctz [Hydrochlorothiazide]      dehydration    Lisinopril Other (See Comments)     Cough         Prior to Visit Medications    Medication Sig Taking?  Authorizing Provider   telmisartan (MICARDIS) 20 MG tablet Take 1 tablet by mouth daily Yes Karly Clay MD   losartan (COZAAR) 100 MG tablet Take 1 tablet by mouth daily Yes Karly Clay MD   losartan (COZAAR) 50 MG tablet Take 1 tablet by mouth daily Yes Karly Clay MD   hydrochlorothiazide (MICROZIDE) 12.5 MG capsule Take 1 capsule by mouth every morning Yes Karly Clay MD   losartan-hydrochlorothiazide (HYZAAR) 50-12.5 MG per tablet Take 1 tablet by mouth daily Yes Karly Clay MD   doxazosin (CARDURA) 4 MG tablet Take 1 tablet by mouth daily Yes Stevie Roberts MD   Blood Pressure KIT Check BP twice a week/HTN Yes Stevie Roberts MD   doxazosin (CARDURA) 1 MG tablet Take 2 tablets by mouth nightly Yes DEEPTI Way CNP   amLODIPine (NORVASC) 10 MG tablet TAKE 1 TABLET BY MOUTH EVERY DAY Yes DEEPTI Way CNP   simvastatin (ZOCOR) 40 MG tablet Take 1 tablet by mouth nightly Yes DEEPTI Way CNP   aspirin 81 MG chewable tablet Take 81 mg by mouth daily Yes

## 2020-08-24 ENCOUNTER — HOSPITAL ENCOUNTER (OUTPATIENT)
Dept: NON INVASIVE DIAGNOSTICS | Age: 55
Discharge: HOME | End: 2020-08-24
Payer: COMMERCIAL

## 2020-08-24 LAB
LV EF: 50 %
LVEF MODALITY: NORMAL

## 2020-08-24 PROCEDURE — 93320 DOPPLER ECHO COMPLETE: CPT

## 2020-08-24 PROCEDURE — 93351 STRESS TTE COMPLETE: CPT

## 2020-09-03 ENCOUNTER — OFFICE VISIT (OUTPATIENT)
Dept: PRIMARY CARE CLINIC | Age: 55
End: 2020-09-03
Payer: COMMERCIAL

## 2020-09-03 PROCEDURE — 99211 OFF/OP EST MAY X REQ PHY/QHP: CPT | Performed by: NURSE PRACTITIONER

## 2020-09-03 PROCEDURE — G8417 CALC BMI ABV UP PARAM F/U: HCPCS | Performed by: NURSE PRACTITIONER

## 2020-09-03 PROCEDURE — G8428 CUR MEDS NOT DOCUMENT: HCPCS | Performed by: NURSE PRACTITIONER

## 2020-09-03 NOTE — PATIENT INSTRUCTIONS

## 2020-09-03 NOTE — PROGRESS NOTES
Zee Wright received a viral test for COVID-19. They were educated on isolation and quarantine as appropriate. For any symptoms, they were directed to seek care from their PCP, given contact information to establish with a doctor, directed to an urgent care or the emergency room.

## 2020-09-05 LAB — SARS-COV-2, NAA: NOT DETECTED

## 2020-09-08 ENCOUNTER — TELEPHONE (OUTPATIENT)
Dept: FAMILY MEDICINE CLINIC | Age: 55
End: 2020-09-08

## 2020-09-08 NOTE — TELEPHONE ENCOUNTER
Pt called states he rec'd 8 phones calls from our office in the last week, has no idea what it is about, I asked him if he got his lab results, he states that he did not have labs drawn anywhere on the 27th, even though there are results in his chart. Please call back, looks like we called him regarding his echo.

## 2020-09-09 ENCOUNTER — HOSPITAL ENCOUNTER (OUTPATIENT)
Dept: PULMONOLOGY | Age: 55
Discharge: HOME | End: 2020-09-09
Payer: COMMERCIAL

## 2020-09-09 VITALS — RESPIRATION RATE: 16 BRPM | OXYGEN SATURATION: 99 % | HEART RATE: 61 BPM

## 2020-09-09 LAB
DLCO %PRED: 121 %
DLCO PRED: NORMAL
DLCO/VA %PRED: NORMAL
DLCO/VA PRED: NORMAL
DLCO/VA: NORMAL
DLCO: NORMAL
EXPIRATORY TIME: NORMAL
FEF 25-75% %PRED-PRE: NORMAL
FEF 25-75% PRED: NORMAL
FEF 25-75%-PRE: NORMAL
FEV1 %PRED-PRE: 98 %
FEV1 PRED: NORMAL
FEV1/FVC %PRED-PRE: NORMAL
FEV1/FVC PRED: NORMAL
FEV1/FVC: 106 %
FEV1: NORMAL
FVC %PRED-PRE: NORMAL
FVC PRED: NORMAL
FVC: NORMAL
GAW %PRED: NORMAL
GAW PRED: NORMAL
GAW: NORMAL
IC %PRED: NORMAL
IC PRED: NORMAL
IC: NORMAL
MVV %PRED-PRE: NORMAL
MVV PRED: NORMAL
MVV-PRE: NORMAL
PEF %PRED-PRE: NORMAL
PEF PRED: NORMAL
PEF-PRE: NORMAL
RAW %PRED: NORMAL
RAW PRED: NORMAL
RAW: NORMAL
RV %PRED: NORMAL
RV PRED: NORMAL
RV: NORMAL
SVC %PRED: NORMAL
SVC PRED: NORMAL
SVC: NORMAL
TLC %PRED: 114 %
TLC PRED: NORMAL
TLC: NORMAL
VA %PRED: NORMAL
VA PRED: NORMAL
VA: NORMAL
VTG %PRED: NORMAL
VTG PRED: NORMAL
VTG: NORMAL

## 2020-09-09 PROCEDURE — 94200 LUNG FUNCTION TEST (MBC/MVV): CPT

## 2020-09-09 PROCEDURE — 94726 PLETHYSMOGRAPHY LUNG VOLUMES: CPT

## 2020-09-09 PROCEDURE — 94010 BREATHING CAPACITY TEST: CPT

## 2020-09-09 PROCEDURE — 94729 DIFFUSING CAPACITY: CPT

## 2020-09-09 PROCEDURE — 94760 N-INVAS EAR/PLS OXIMETRY 1: CPT

## 2020-09-09 RX ORDER — ALBUTEROL SULFATE 90 UG/1
4 AEROSOL, METERED RESPIRATORY (INHALATION) ONCE
Status: CANCELLED | OUTPATIENT
Start: 2020-09-09

## 2020-09-09 ASSESSMENT — PULMONARY FUNCTION TESTS
FEV1_PERCENT_PREDICTED_PRE: 98
FEV1/FVC: 106

## 2020-09-10 NOTE — PROGRESS NOTES
[facility]   Cardiac Consultation    Referring Provider:  Kecia Bradford MD     Chief Complaint   Patient presents with    Hypertension    shortness of breath    History of Present Illness:  Mr. Emilia Georges is a 60 y.o. male who presents today for shortness of breath. He has a history of hypertension and hyperlipidemia. He had testing 8/2019 after complaints of upper back, neck, and shoulder pain. Nuclear scan was normal with false positive ECG. CT of chest showed no calcium in coronary arteries,no dissection. He most recently had a normal stress echo ordered by his PCP, Dr Miah Tello, for shortness of breath. He also had a COVID test that came back negative. He is a former smoker. He drives a truck for work. Today, he states he has had increased shortness of breath for the last month. He denies chest pain. He cuts his Losartan in half because his BP towards the middle of the day gets to around 766 systolic. He starts to feel dizzy with nausea. His ankles swelled a few days ago and he started taking HCTZ again. He rides his bike at home but quit going to the gym due to [employer]. Past Medical History:   has a past medical history of Hyperlipidemia, Hypertension, Meningitis, and Smoker. Surgical History:   has a past surgical history that includes hernia repair. Social History:   reports that he quit smoking about 2 years ago. His smoking use included cigarettes. He has a 15.00 pack-year smoking history. He has never used smokeless tobacco. He reports current alcohol use of about 6.0 standard drinks of alcohol per week. He reports that he does not use drugs. Family History:  family history includes Heart Disease in his father; Hypertension in his mother. Home Medications:  Prior to Admission medications    Medication Sig Start Date End Date Taking?  Authorizing Provider   hydroCHLOROthiazide (HYDRODIURIL) 12.5 MG tablet Take 12.5 mg by mouth daily   Yes Historical Provider, MD losartan (COZAAR) 100 MG tablet Take 1 tablet by mouth daily 12/17/19  Yes Sudhakar Rivas MD   doxazosin (CARDURA) 4 MG tablet Take 1 tablet by mouth daily 10/14/19  Yes Jeff Gonzalez MD   Blood Pressure KIT Check BP twice a week/HTN 10/14/19  Yes Jeff Gonzalez MD   amLODIPine (NORVASC) 10 MG tablet TAKE 1 TABLET BY MOUTH EVERY DAY 8/27/19  Yes DEEPTI Valencia CNP   simvastatin (ZOCOR) 40 MG tablet Take 1 tablet by mouth nightly 8/27/19  Yes DEEPTI Valencia CNP        Allergies:  Hctz [hydrochlorothiazide] and Lisinopril     Review of Systems:   · Constitutional: there has been no unanticipated weight loss. There's been no change in energy level, sleep pattern, or activity level. · Eyes: No visual changes or diplopia. No scleral icterus. · ENT: No Headaches, hearing loss or vertigo. No mouth sores or sore throat. · Cardiovascular: Reviewed in HPI  · Respiratory: No cough or wheezing, no sputum production. No hematemesis. · Gastrointestinal: No abdominal pain, appetite loss, blood in stools. No change in bowel or bladder habits. · Genitourinary: No dysuria, trouble voiding, or hematuria. · Musculoskeletal:  No gait disturbance, weakness or joint complaints. · Integumentary: No rash or pruritis. · Neurological: No headache, diplopia, change in muscle strength, numbness or tingling. No change in gait, balance, coordination, mood, affect, memory, mentation, behavior. · Psychiatric: No anxiety, no depression. · Endocrine: No malaise, fatigue or temperature intolerance. No excessive thirst, fluid intake, or urination. No tremor. · Hematologic/Lymphatic: No abnormal bruising or bleeding, blood clots or swollen lymph nodes. · Allergic/Immunologic: No nasal congestion or hives.     Physical Examination:    Vitals:    09/11/20 0837   BP: 130/76   Pulse: 64        Wt Readings from Last 1 Encounters:   09/11/20 242 lb 9.6 oz (110 kg)       Constitutional and General Appearance: Awake, alert, and oriented. Skin:good turgor,intact without lesions  HEENT: EOMI ,normal  Neck:no JVD    Respiratory:  · Normal excursion and expansion without use of accessory muscles  · Resp Auscultation: Normal breath sounds without dullness  Cardiovascular:  · The apical impulses not displaced  · Heart tones are crisp and normal  · Cervical veins are not engorged  · The carotid upstroke is normal in amplitude and contour without delay or bruit  · Peripheral pulses are symmetrical and full  · There is no clubbing, cyanosis of the extremities. · No edema  · Femoral Arteries: 2+ and equal  · Pedal Pulses: 2+ and equal   Abdomen:  · No masses or tenderness  · Liver/Spleen: No Abnormalities Noted  Neurological/Psychiatric:  · Alert and oriented in all spheres  · Moves all extremities well  · Exhibits normal gait balance and coordination  · No abnormalities of mood, affect, memory, mentation, or behavior are noted    STRESS MYOVIEW 8/12/19:   Summary     There is normal isotope uptake at stress and rest. There is no evidence of     myocardial ischemia or scar.     Normal LV function.  HTN with exagerated BP response to exercise     Overall findings represent a intermediate risk scan due to abnormal ECG     response to exercise.         Stress Protocols          Resting ECG     Normal sinus rhythm.     1 mm ST horizontal depression inferior     and anterolateral leads with stress     consistent with ischemia. STRESS ECHO 8/24/20:   Summary   -Normal left ventricle size, wall thickness, and systolic function with an   estimated ejection fraction of 55%.   -No regional wall motion abnormalities are seen. Normal function of all valves. -Normal diastolic function. Avg. E/e'=7.3      Summary   Normal EKG response to exercise . Normal stress ECHO.          Assessment/Plan:      HTN  /76 (Site: Right Upper Arm, Position: Sitting, Cuff Size: Large Adult)   Pulse 64   Ht 6' (1.829 m)   Wt 242 lb 9.6 oz

## 2020-09-10 NOTE — PROCEDURES
Pulmonary Function Testing      Patient name:  Wei Chawla     [address] [place] [address] Unit #:   [id number]   Date of test:  9/9/2020  Date of interpretation:   9/10/2020    Mr. Wei Chawla is a 60y.o. year-old former smoker. The spirometry data were acceptable and reproducible. Spirometry:  Flow volume loops were normal. The FEV-1/FVC ratio was normal. The FEV-1 was 3.90 liters (98% of predicted), which was normal. The FVC was 4.81 liters (92% of predicted), which was normal. Response to inhaled bronchodilators (albuterol) was not performed. Lung volumes:  Lung volumes were tested by plethysmography. The total lung capacity was 8.20 liters (114% of predicted), which was normal. The residual volume was 2.64 liters (108% of predicted), which was normal. The ratio of residual volume to total lung capacity (RV/TLC) was 89, which was normal.     Diffusion capacity was found to be increased.        Interpretation:  Normal pulmonary function test

## 2020-09-11 ENCOUNTER — OFFICE VISIT (OUTPATIENT)
Dept: CARDIOLOGY CLINIC | Age: 55
End: 2020-09-11
Payer: COMMERCIAL

## 2020-09-11 VITALS
HEIGHT: 72 IN | WEIGHT: 242.6 LBS | SYSTOLIC BLOOD PRESSURE: 130 MMHG | HEART RATE: 64 BPM | BODY MASS INDEX: 32.86 KG/M2 | DIASTOLIC BLOOD PRESSURE: 76 MMHG

## 2020-09-11 PROCEDURE — 1036F TOBACCO NON-USER: CPT | Performed by: INTERNAL MEDICINE

## 2020-09-11 PROCEDURE — 3017F COLORECTAL CA SCREEN DOC REV: CPT | Performed by: INTERNAL MEDICINE

## 2020-09-11 PROCEDURE — 99214 OFFICE O/P EST MOD 30 MIN: CPT | Performed by: INTERNAL MEDICINE

## 2020-09-11 PROCEDURE — G8417 CALC BMI ABV UP PARAM F/U: HCPCS | Performed by: INTERNAL MEDICINE

## 2020-09-11 PROCEDURE — G8427 DOCREV CUR MEDS BY ELIG CLIN: HCPCS | Performed by: INTERNAL MEDICINE

## 2020-09-11 RX ORDER — HYDROCHLOROTHIAZIDE 12.5 MG/1
12.5 TABLET ORAL DAILY
COMMUNITY
End: 2020-09-11 | Stop reason: ALTCHOICE

## 2020-09-11 RX ORDER — LOSARTAN POTASSIUM 100 MG/1
TABLET ORAL
Qty: 30 TABLET | Refills: 5
Start: 2020-09-11 | End: 2020-10-01

## 2020-09-11 RX ORDER — AMLODIPINE BESYLATE 10 MG/1
TABLET ORAL
Qty: 90 TABLET | Refills: 3
Start: 2020-09-11 | End: 2020-10-01

## 2020-09-11 NOTE — PATIENT INSTRUCTIONS
Decrease Amlodipine to 5 mg nightly  Take Losartan 50 mg twice a day  Stop HCTZ  Continue Doxazosin at night  Follow up in 2 months with nurse practitioner

## 2020-10-01 RX ORDER — LOSARTAN POTASSIUM 100 MG/1
TABLET ORAL
Qty: 90 TABLET | Refills: 2 | Status: ON HOLD | OUTPATIENT
Start: 2020-10-01 | End: 2021-07-14 | Stop reason: HOSPADM

## 2020-10-01 RX ORDER — AMLODIPINE BESYLATE 10 MG/1
TABLET ORAL
Qty: 90 TABLET | Refills: 2 | Status: SHIPPED | OUTPATIENT
Start: 2020-10-01 | End: 2020-11-11

## 2020-10-01 NOTE — TELEPHONE ENCOUNTER
Medication:   Requested Prescriptions     Pending Prescriptions Disp Refills    losartan (COZAAR) 100 MG tablet [Pharmacy Med Name: LOSARTAN POTASSIUM 100 MG TAB] 30 tablet 5     Sig: TAKE 1 TABLET BY MOUTH EVERY DAY    amLODIPine (NORVASC) 10 MG tablet [Pharmacy Med Name: AMLODIPINE BESYLATE 10 MG TAB] 30 tablet 11     Sig: TAKE 1 TABLET BY MOUTH EVERY DAY       Last Filled:   9/11/20     Patient Phone Number: [phone] (home)     Last appt: 8/18/2020 shortness of breath   Next appt: Visit date not found    Lab Results   Component Value Date     08/27/2019    K 4.4 08/27/2019     08/27/2019    CO2 25 08/27/2019    BUN 15 08/27/2019    CREATININE 1.2 08/27/2019    GLUCOSE 111 (H) 08/15/2019    CALCIUM 9.7 08/27/2019    PROT 7.2 08/27/2019    LABALBU 4.7 08/27/2019    BILITOT 0.5 08/27/2019    ALKPHOS 80 08/27/2019    AST 23 08/27/2019    ALT 25 08/27/2019    LABGLOM >60 08/27/2019    GFRAA >60 08/27/2019    AGRATIO 1.9 08/27/2019    GLOB 2.5 08/27/2019

## 2020-11-11 ENCOUNTER — OFFICE VISIT (OUTPATIENT)
Dept: CARDIOLOGY CLINIC | Age: 55
End: 2020-11-11
Payer: COMMERCIAL

## 2020-11-11 VITALS
OXYGEN SATURATION: 98 % | HEIGHT: 72 IN | WEIGHT: 251 LBS | HEART RATE: 61 BPM | SYSTOLIC BLOOD PRESSURE: 152 MMHG | DIASTOLIC BLOOD PRESSURE: 76 MMHG | BODY MASS INDEX: 34 KG/M2

## 2020-11-11 PROCEDURE — 3017F COLORECTAL CA SCREEN DOC REV: CPT | Performed by: NURSE PRACTITIONER

## 2020-11-11 PROCEDURE — G8427 DOCREV CUR MEDS BY ELIG CLIN: HCPCS | Performed by: NURSE PRACTITIONER

## 2020-11-11 PROCEDURE — G8417 CALC BMI ABV UP PARAM F/U: HCPCS | Performed by: NURSE PRACTITIONER

## 2020-11-11 PROCEDURE — 1036F TOBACCO NON-USER: CPT | Performed by: NURSE PRACTITIONER

## 2020-11-11 PROCEDURE — G8484 FLU IMMUNIZE NO ADMIN: HCPCS | Performed by: NURSE PRACTITIONER

## 2020-11-11 PROCEDURE — 99213 OFFICE O/P EST LOW 20 MIN: CPT | Performed by: NURSE PRACTITIONER

## 2020-11-11 NOTE — PATIENT INSTRUCTIONS
Start walking routinely with goals for weight loss ; should help lessen your shortness of breath    appt in 4-6 months

## 2020-11-11 NOTE — PROGRESS NOTES
[address]     Outpatient Follow Up Note    Erika Lefort is 60 y.o. male who presents today with a history of HTN, SOB and hyperlipidemia. CHIEF COMPLAINT / HPI:  Follow Up secondary to medication changes : decreased norvasc to 5 mg daily and losartan 50 mg bid. Considered stopping HCTZ if swelling improved after decreasing norvasc    Subjective:   He stopped taking norvasc about a week after last seen d/t swelling; which has resolved. He only takes losartan 50 mg once daily; bid is too much. His home BPs run on average 130/72 ; he has gotten as low as 114/ .     he denies significant chest pain. There is URENA. He quit smoking 3 years ago. It no longer hurts in his back when taking a deep breath. The patient denies orthopnea/PND. The patients weight up since he stopped smoking and with inactivity d/t pandemic: 225# > 235# > 243# . The patient is not experiencing palpitations or dizziness. These symptoms are improving since the last OV. With regard to medication therapy the patient has been (?) compliant with prescribed regimen. They have tolerated therapy to date. Past Medical History:   Diagnosis Date    Hyperlipidemia     Hypertension     Meningitis     2007    Smoker      Social History:    Social History     Tobacco Use   Smoking Status Former Smoker    Packs/day: 0.50    Years: 30.00    Pack years: 15.00    Types: Cigarettes    Last attempt to quit: 2018    Years since quittin.8   Smokeless Tobacco Never Used     Current Medications:  Current Outpatient Medications   Medication Sig Dispense Refill    losartan (COZAAR) 100 MG tablet TAKE 1 TABLET BY MOUTH EVERY DAY (Patient taking differently: Take 50 mg by mouth daily TAKE 1 TABLET BY MOUTH EVERY DAY) 90 tablet 2    doxazosin (CARDURA) 4 MG tablet Take 1 tablet by mouth daily 90 tablet 3    Blood Pressure KIT Check BP twice a week/HTN 1 kit 0     No current facility-administered medications for this visit. REVIEW OF SYSTEMS:    CONSTITUTIONAL: + major weight gain; fatigue, weakness, night sweats or fever. HEENT: No new vision difficulties or ringing in the ears. RESPIRATORY: No new SOB, PND, orthopnea or cough. CARDIOVASCULAR: See HPI  GI: No nausea, vomiting, diarrhea, constipation, abdominal pain or changes in bowel habits. : No urinary frequency, urgency, incontinence hematuria or dysuria. SKIN: No cyanosis or skin lesions. MUSCULOSKELETAL: No new muscle or joint pain. NEUROLOGICAL: No syncope or TIA-like symptoms. PSYCHIATRIC: No anxiety, pain, insomnia or depression    Objective:   PHYSICAL EXAM:    Vitals:    11/11/20 0907 11/11/20 0926   BP: (!) 140/80 (!) 152/76   Site: Right Upper Arm    Position: Sitting    Cuff Size: Large Adult    Pulse: 61    SpO2: 98%    Weight: 251 lb (113.9 kg)    Height: 6' (1.829 m)          VITALS:  BP (!) 140/80 (Site: Right Upper Arm, Position: Sitting, Cuff Size: Large Adult)   Pulse 61   Ht 6' (1.829 m)   Wt 251 lb (113.9 kg)   SpO2 98%   BMI 34.04 kg/m²   CONSTITUTIONAL: Cooperative, no apparent distress, and appears well nourished / developed  NEUROLOGIC:  Awake and orientated to person, place and time. PSYCH: Calm affect. SKIN: Warm and dry. HEENT: Sclera non-icteric, normocephalic, neck supple, no elevation of JVP, normal carotid pulses with no bruits and thyroid normal size. LUNGS:  No increased work of breathing and clear to auscultation, no crackles or wheezing  CARDIOVASCULAR:  Regular rate 64 and rhythm with no murmurs, gallops, rubs, or abnormal heart sounds, normal PMI. The apical impulses not displaced  JVP less than 8 cm H2O  Heart tones are crisp and normal  Cervical veins are not engorged  The carotid upstroke is normal in amplitude and contour without delay or bruit  JVP is not elevated  ABDOMEN:  Normal bowel sounds, non-distended and non-tender to palpation  EXT: No edema, no calf tenderness. Pulses are present bilaterally.     DATA: Lab Results   Component Value Date    ALT 25 08/27/2019    AST 23 08/27/2019    ALKPHOS 80 08/27/2019    BILITOT 0.5 08/27/2019     Lab Results   Component Value Date    CREATININE 1.2 08/27/2019    BUN 15 08/27/2019     08/27/2019    K 4.4 08/27/2019     08/27/2019    CO2 25 08/27/2019       Lab Results   Component Value Date    WBC 6.6 08/27/2019    HGB 14.8 08/27/2019    HCT 42.7 08/27/2019    MCV 89.7 08/27/2019     08/27/2019     No components found for: CHLPL  Lab Results   Component Value Date    TRIG 265 (H) 05/04/2018    TRIG 463 (H) 01/29/2018    TRIG 473 (H) 09/29/2015     Lab Results   Component Value Date    HDL 36 (L) 08/27/2019    HDL 37 (L) 05/04/2018    HDL 31 (L) 01/29/2018     Lab Results   Component Value Date    LDLCALC see below 08/27/2019    LDLCALC 89 05/04/2018    LDLCALC see below 01/29/2018     Lab Results   Component Value Date    LABVLDL see below 08/27/2019    LABVLDL 53 05/04/2018    LABVLDL see below 01/29/2018     Radiology Review:  Pertinent images / reports were reviewed as a part of this visit and reveals the following:    Stress Test : Aug '19       Summary    There is normal isotope uptake at stress and rest. There is no evidence of    myocardial ischemia or scar.    Normal LV function.  HTN with exagerated BP response to exercise    Overall findings represent a intermediate risk scan due to abnormal ECG    response to exercise. Last Echo: 8/24/20  Summary   Normal EKG response to exercise . Normal stress ECHO. Rest      ECG   Normal sinus rhythm.    Stress      Stress Type: Exercise   Stress Protocol: Juan      Rest HR: 75 bpm                           HR Response: Normal   Rest BP: 128/78 mmHg                      BP Response: Normal   Stress Peak HR: 169 bpm                   HR BP Product: [id number]   Stress Peak BP: 187/96 mmHg               Max Exercise: 10 METS   Predicted HR: 163 bpm   % of predicted HR: 104   Test Duration: 9 min and 10 sec   Reason for Termination: Target heart rate      Results      Echo (rest): Normal (LVEF >50%)   Echo (stress): Normal (LVEF >50%)      Echo   Baseline resting echocardiogram shows normal global LV systolic function   with an ejection fraction of 55% and uniform myocardial segmental wall   motion. Following stress there was uniform augmentation of all myocardial   segments with appropriate hyperdynamic LV systolic response to stress. ECG   Approximately 1 mm of ST horizontal depression that is equivocal for   ischemia. Symptoms   No symptoms with exercise. Echo: Summary   -Normal left ventricle size, wall thickness, and systolic function with an   estimated ejection fraction of 55%.   -No regional wall motion abnormalities are seen. Normal function of all valves. -Normal diastolic function. Avg. E/e'=7.3      Assessment:      Diagnosis Orders   1. Essential hypertension  ~controlled by home report  ~suboptimal in office today  ~taking losartan 50 mg once daily ; states intolerant to higher dose  ~intolerant to HCTZ     2. SOB (shortness of breath)   ~offers no c/o   ~neg exam    3. Mixed hyperlipidemia   ~trig elevated ; HDL low  ~no longer takes simvastatin : reason unclear      I had the opportunity to review the clinical symptoms and presentation of Dayana Domingo. Plan:     1. Encouraged exercise / walking to help with weight loss  2. Declines sleep eval (states too much politics with employer if diagnosed with RAMBO)  2. F/U in 4-6 months    Overall the patient is stable from CV standpoint    I have addresed the patient's cardiac risk factors and adjusted pharmacologic treatment as needed. In addition, I have reinforced the need for patient directed risk factor modification. Further evaluation will be based upon the patient's clinical course and testing results. All questions and concerns were addressed to the patient. Alternatives to my treatment were discussed.       The patient

## 2020-12-10 RX ORDER — DOXAZOSIN MESYLATE 4 MG/1
TABLET ORAL
Qty: 30 TABLET | Refills: 11 | Status: ON HOLD | OUTPATIENT
Start: 2020-12-10 | End: 2021-07-14 | Stop reason: HOSPADM

## 2020-12-10 NOTE — TELEPHONE ENCOUNTER
Medication:   Requested Prescriptions     Pending Prescriptions Disp Refills    doxazosin (CARDURA) 4 MG tablet [Pharmacy Med Name: DOXAZOSIN MESYLATE 4 MG TAB] 30 tablet 11     Sig: TAKE 1 TABLET BY MOUTH EVERY DAY       Last Filled:  10/14/2019    Patient Phone Number: [phone] (home)     Last appt: 8/18/2020   Next appt: 12/15/2020    Lab Results   Component Value Date     08/27/2019    K 4.4 08/27/2019     08/27/2019    CO2 25 08/27/2019    BUN 15 08/27/2019    CREATININE 1.2 08/27/2019    GLUCOSE 111 (H) 08/15/2019    CALCIUM 9.7 08/27/2019    PROT 7.2 08/27/2019    LABALBU 4.7 08/27/2019    BILITOT 0.5 08/27/2019    ALKPHOS 80 08/27/2019    AST 23 08/27/2019    ALT 25 08/27/2019    LABGLOM >60 08/27/2019    GFRAA >60 08/27/2019    AGRATIO 1.9 08/27/2019    GLOB 2.5 08/27/2019

## 2020-12-15 ENCOUNTER — OFFICE VISIT (OUTPATIENT)
Dept: FAMILY MEDICINE CLINIC | Age: 55
End: 2020-12-15
Payer: COMMERCIAL

## 2020-12-15 VITALS
TEMPERATURE: 97.9 F | SYSTOLIC BLOOD PRESSURE: 170 MMHG | WEIGHT: 258 LBS | HEART RATE: 66 BPM | BODY MASS INDEX: 34.95 KG/M2 | HEIGHT: 72 IN | DIASTOLIC BLOOD PRESSURE: 98 MMHG | OXYGEN SATURATION: 98 %

## 2020-12-15 PROCEDURE — 99214 OFFICE O/P EST MOD 30 MIN: CPT | Performed by: FAMILY MEDICINE

## 2020-12-15 PROCEDURE — G8417 CALC BMI ABV UP PARAM F/U: HCPCS | Performed by: FAMILY MEDICINE

## 2020-12-15 PROCEDURE — 3017F COLORECTAL CA SCREEN DOC REV: CPT | Performed by: FAMILY MEDICINE

## 2020-12-15 PROCEDURE — 1036F TOBACCO NON-USER: CPT | Performed by: FAMILY MEDICINE

## 2020-12-15 PROCEDURE — G8427 DOCREV CUR MEDS BY ELIG CLIN: HCPCS | Performed by: FAMILY MEDICINE

## 2020-12-15 PROCEDURE — G8484 FLU IMMUNIZE NO ADMIN: HCPCS | Performed by: FAMILY MEDICINE

## 2020-12-15 RX ORDER — ALBUTEROL SULFATE 90 UG/1
2 AEROSOL, METERED RESPIRATORY (INHALATION) EVERY 6 HOURS PRN
Qty: 1 INHALER | Refills: 3 | Status: SHIPPED | OUTPATIENT
Start: 2020-12-15 | End: 2021-04-12

## 2020-12-15 RX ORDER — SULFACETAMIDE SODIUM 100 MG/ML
2 SOLUTION/ DROPS OPHTHALMIC 4 TIMES DAILY
Qty: 1 BOTTLE | Refills: 0 | Status: SHIPPED | OUTPATIENT
Start: 2020-12-15 | End: 2020-12-25

## 2020-12-15 NOTE — PROGRESS NOTES
Kashif Cruz is a 60 y.o. male. HPI:  Wife had covid  His test was neg  10 you to have spells of shortness of breath that sometimes are relieved when he takes deep breaths several times  He does not have chest pain or dizziness with this  He has had PFTs that were normal, echo the stress echo that was normal, chest CT scan is normal, he has an oximeter and when he is out of breath he checks it and still 95 to 90%  He quit smoking about 3 to 4 years ago  He does not feel his shortness of breath is part of an anxiety attack  Wt Readings from Last 3 Encounters:   12/15/20 258 lb (117 kg)   11/11/20 251 lb (113.9 kg)   09/11/20 242 lb 9.6 oz (110 kg)     Meds, vitamins and allergies reviewed with Patient    ROS:  Gen: No fever  HEENT: No cold symptoms, no sore throat. CV:  Denies chest pain or palpitations. Pulm:  Denies shortness of breath, cough. Abd:  Denies abdominal pain, nausea and vomiting. Skin: no rash    Allergies   Allergen Reactions    Hctz [Hydrochlorothiazide]      dehydration    Lisinopril Other (See Comments)     Cough         Prior to Visit Medications    Medication Sig Taking? Authorizing Provider   doxazosin (CARDURA) 4 MG tablet TAKE 1 TABLET BY MOUTH EVERY DAY Yes Sandrita Sahu MD   losartan (COZAAR) 100 MG tablet TAKE 1 TABLET BY MOUTH EVERY DAY  Patient taking differently: Take 50 mg by mouth daily TAKE 1 TABLET BY MOUTH EVERY DAY Yes Sandrita Sahu MD   Blood Pressure KIT Check BP twice a week/HTN Yes Jarad Vaz MD       OBJECTIVE:  BP (!) 156/96   Pulse 66   Temp 97.9 °F (36.6 °C)   Ht 6' 0.01\" (1.829 m)   Wt 258 lb (117 kg)   SpO2 98%   BMI 34.98 kg/m²   GEN:  in NAD moderate obesity-weight up 7 pounds  NECK:  Supple without adenopathy. No bruits  CV:  Regular rate and rhythm, S1 and S2 normal, no murmurs, clicks  PULM:  Chest is clear, no wheezing ,  symmetric air entry throughout both lung fields.   NEURO: Alert and oriented ×3  stress echo - neg  pft  - nl Chest ct - neg  ASSESSMENT/PLAN:  Sob ? bronchospasm  Trial proair    htn - has been out of cardura for 30 days  Resume Cardura and observe  Labs ordered  Low-salt low alcohol regular exercise    IMM - declines flu and shingrix  HM - colon    Return to office in 6 weeks for blood pressure recheck    Spent 25 minutes with patient greater 50% time reviewing notes from cardiologist, previous work-up, and coordinating his care

## 2021-03-26 DIAGNOSIS — Z00.00 WELL ADULT EXAM: ICD-10-CM

## 2021-03-26 LAB
A/G RATIO: 1.7 (ref 1.1–2.2)
ALBUMIN SERPL-MCNC: 4.4 G/DL (ref 3.4–5)
ALP BLD-CCNC: 67 U/L (ref 40–129)
ALT SERPL-CCNC: 22 U/L (ref 10–40)
ANION GAP SERPL CALCULATED.3IONS-SCNC: 9 MMOL/L (ref 3–16)
AST SERPL-CCNC: 20 U/L (ref 15–37)
BASOPHILS ABSOLUTE: 0 K/UL (ref 0–0.2)
BASOPHILS RELATIVE PERCENT: 0.5 %
BILIRUB SERPL-MCNC: 0.5 MG/DL (ref 0–1)
BUN BLDV-MCNC: 15 MG/DL (ref 7–20)
CALCIUM SERPL-MCNC: 9.4 MG/DL (ref 8.3–10.6)
CHLORIDE BLD-SCNC: 101 MMOL/L (ref 99–110)
CHOLESTEROL, TOTAL: 242 MG/DL (ref 0–199)
CO2: 26 MMOL/L (ref 21–32)
CREAT SERPL-MCNC: 1.3 MG/DL (ref 0.9–1.3)
EOSINOPHILS ABSOLUTE: 0.1 K/UL (ref 0–0.6)
EOSINOPHILS RELATIVE PERCENT: 1.3 %
GFR AFRICAN AMERICAN: >60
GFR NON-AFRICAN AMERICAN: 57
GLOBULIN: 2.6 G/DL
GLUCOSE BLD-MCNC: 103 MG/DL (ref 70–99)
HCT VFR BLD CALC: 43.1 % (ref 40.5–52.5)
HDLC SERPL-MCNC: 34 MG/DL (ref 40–60)
HEMOGLOBIN: 15 G/DL (ref 13.5–17.5)
LDL CHOLESTEROL CALCULATED: ABNORMAL MG/DL
LDL CHOLESTEROL DIRECT: 112 MG/DL
LYMPHOCYTES ABSOLUTE: 1.7 K/UL (ref 1–5.1)
LYMPHOCYTES RELATIVE PERCENT: 27.8 %
MCH RBC QN AUTO: 31.5 PG (ref 26–34)
MCHC RBC AUTO-ENTMCNC: 34.9 G/DL (ref 31–36)
MCV RBC AUTO: 90.2 FL (ref 80–100)
MONOCYTES ABSOLUTE: 0.7 K/UL (ref 0–1.3)
MONOCYTES RELATIVE PERCENT: 10.4 %
NEUTROPHILS ABSOLUTE: 3.7 K/UL (ref 1.7–7.7)
NEUTROPHILS RELATIVE PERCENT: 60 %
PDW BLD-RTO: 14 % (ref 12.4–15.4)
PLATELET # BLD: 214 K/UL (ref 135–450)
PMV BLD AUTO: 7.8 FL (ref 5–10.5)
POTASSIUM SERPL-SCNC: 4.5 MMOL/L (ref 3.5–5.1)
PROSTATE SPECIFIC ANTIGEN: 2.31 NG/ML (ref 0–4)
RBC # BLD: 4.78 M/UL (ref 4.2–5.9)
SODIUM BLD-SCNC: 136 MMOL/L (ref 136–145)
TOTAL PROTEIN: 7 G/DL (ref 6.4–8.2)
TRIGL SERPL-MCNC: 402 MG/DL (ref 0–150)
TSH REFLEX: 1.27 UIU/ML (ref 0.27–4.2)
VLDLC SERPL CALC-MCNC: ABNORMAL MG/DL
WBC # BLD: 6.2 K/UL (ref 4–11)

## 2021-03-27 RX ORDER — ATORVASTATIN CALCIUM 20 MG/1
20 TABLET, FILM COATED ORAL DAILY
Qty: 30 TABLET | Refills: 3 | Status: SHIPPED | OUTPATIENT
Start: 2021-03-27 | End: 2021-08-09 | Stop reason: SDUPTHER

## 2021-04-12 NOTE — TELEPHONE ENCOUNTER
Medication:   Requested Prescriptions     Pending Prescriptions Disp Refills    VENTOLIN  (90 Base) MCG/ACT inhaler [Pharmacy Med Name: VENTOLIN HFA 90 MCG INHALER] 18 Inhaler 3     Sig: TAKE 2 PUFFS BY MOUTH EVERY 6 HOURS AS NEEDED FOR WHEEZE        Last Filled:  12/15/2020 #1 3rf    Patient Phone Number: [phone] (home)     Last appt: 12/15/2020   Next appt: Visit date not found    Last OARRS: No flowsheet data found.

## 2021-07-09 PROCEDURE — 99283 EMERGENCY DEPT VISIT LOW MDM: CPT

## 2021-07-09 PROCEDURE — 93005 ELECTROCARDIOGRAM TRACING: CPT | Performed by: EMERGENCY MEDICINE

## 2021-07-09 ASSESSMENT — PAIN SCALES - GENERAL: PAINLEVEL_OUTOF10: 6

## 2021-07-10 ENCOUNTER — HOSPITAL ENCOUNTER (INPATIENT)
Age: 56
LOS: 3 days | Discharge: HOME | DRG: 305 | End: 2021-07-14
Attending: EMERGENCY MEDICINE | Admitting: INTERNAL MEDICINE
Payer: COMMERCIAL

## 2021-07-10 ENCOUNTER — APPOINTMENT (OUTPATIENT)
Dept: GENERAL RADIOLOGY | Age: 56
DRG: 305 | End: 2021-07-10
Payer: COMMERCIAL

## 2021-07-10 DIAGNOSIS — I20.8 ANGINAL EQUIVALENT (HCC): Primary | ICD-10-CM

## 2021-07-10 PROBLEM — I10 UNCONTROLLED HYPERTENSION: Status: ACTIVE | Noted: 2021-07-10

## 2021-07-10 LAB
A/G RATIO: 1.7 (ref 1.1–2.2)
ALBUMIN SERPL-MCNC: 4.3 G/DL (ref 3.4–5)
ALP BLD-CCNC: 103 U/L (ref 40–129)
ALT SERPL-CCNC: 32 U/L (ref 10–40)
ANION GAP SERPL CALCULATED.3IONS-SCNC: 10 MMOL/L (ref 3–16)
AST SERPL-CCNC: 25 U/L (ref 15–37)
BASOPHILS ABSOLUTE: 0.1 K/UL (ref 0–0.2)
BASOPHILS RELATIVE PERCENT: 0.7 %
BILIRUB SERPL-MCNC: 0.3 MG/DL (ref 0–1)
BUN BLDV-MCNC: 23 MG/DL (ref 7–20)
CALCIUM SERPL-MCNC: 9.5 MG/DL (ref 8.3–10.6)
CHLORIDE BLD-SCNC: 104 MMOL/L (ref 99–110)
CO2: 25 MMOL/L (ref 21–32)
CREAT SERPL-MCNC: 1.2 MG/DL (ref 0.9–1.3)
EKG ATRIAL RATE: 66 BPM
EKG DIAGNOSIS: NORMAL
EKG P AXIS: -18 DEGREES
EKG P-R INTERVAL: 124 MS
EKG Q-T INTERVAL: 404 MS
EKG QRS DURATION: 84 MS
EKG QTC CALCULATION (BAZETT): 423 MS
EKG R AXIS: 39 DEGREES
EKG T AXIS: 41 DEGREES
EKG VENTRICULAR RATE: 66 BPM
EOSINOPHILS ABSOLUTE: 0.2 K/UL (ref 0–0.6)
EOSINOPHILS RELATIVE PERCENT: 2.1 %
GFR AFRICAN AMERICAN: >60
GFR NON-AFRICAN AMERICAN: >60
GLOBULIN: 2.6 G/DL
GLUCOSE BLD-MCNC: 110 MG/DL (ref 70–99)
HCT VFR BLD CALC: 43 % (ref 40.5–52.5)
HEMOGLOBIN: 14.8 G/DL (ref 13.5–17.5)
LYMPHOCYTES ABSOLUTE: 2.1 K/UL (ref 1–5.1)
LYMPHOCYTES RELATIVE PERCENT: 26.8 %
MCH RBC QN AUTO: 31.4 PG (ref 26–34)
MCHC RBC AUTO-ENTMCNC: 34.4 G/DL (ref 31–36)
MCV RBC AUTO: 91.3 FL (ref 80–100)
MONOCYTES ABSOLUTE: 0.9 K/UL (ref 0–1.3)
MONOCYTES RELATIVE PERCENT: 11.3 %
NEUTROPHILS ABSOLUTE: 4.6 K/UL (ref 1.7–7.7)
NEUTROPHILS RELATIVE PERCENT: 59.1 %
PDW BLD-RTO: 13.7 % (ref 12.4–15.4)
PLATELET # BLD: 214 K/UL (ref 135–450)
PMV BLD AUTO: 7.7 FL (ref 5–10.5)
POTASSIUM SERPL-SCNC: 4.5 MMOL/L (ref 3.5–5.1)
PRO-BNP: 19 PG/ML (ref 0–124)
RBC # BLD: 4.72 M/UL (ref 4.2–5.9)
SODIUM BLD-SCNC: 139 MMOL/L (ref 136–145)
TOTAL PROTEIN: 6.9 G/DL (ref 6.4–8.2)
TROPONIN: <0.01 NG/ML
WBC # BLD: 7.7 K/UL (ref 4–11)

## 2021-07-10 PROCEDURE — 96374 THER/PROPH/DIAG INJ IV PUSH: CPT

## 2021-07-10 PROCEDURE — 6370000000 HC RX 637 (ALT 250 FOR IP): Performed by: INTERNAL MEDICINE

## 2021-07-10 PROCEDURE — 93005 ELECTROCARDIOGRAM TRACING: CPT | Performed by: INTERNAL MEDICINE

## 2021-07-10 PROCEDURE — 85025 COMPLETE CBC W/AUTO DIFF WBC: CPT

## 2021-07-10 PROCEDURE — 2580000003 HC RX 258: Performed by: INTERNAL MEDICINE

## 2021-07-10 PROCEDURE — 6370000000 HC RX 637 (ALT 250 FOR IP): Performed by: NURSE PRACTITIONER

## 2021-07-10 PROCEDURE — 83880 ASSAY OF NATRIURETIC PEPTIDE: CPT

## 2021-07-10 PROCEDURE — 99223 1ST HOSP IP/OBS HIGH 75: CPT | Performed by: INTERNAL MEDICINE

## 2021-07-10 PROCEDURE — 80053 COMPREHEN METABOLIC PANEL: CPT

## 2021-07-10 PROCEDURE — 93010 ELECTROCARDIOGRAM REPORT: CPT | Performed by: INTERNAL MEDICINE

## 2021-07-10 PROCEDURE — G0378 HOSPITAL OBSERVATION PER HR: HCPCS

## 2021-07-10 PROCEDURE — 84484 ASSAY OF TROPONIN QUANT: CPT

## 2021-07-10 PROCEDURE — 6360000002 HC RX W HCPCS: Performed by: INTERNAL MEDICINE

## 2021-07-10 PROCEDURE — 71045 X-RAY EXAM CHEST 1 VIEW: CPT

## 2021-07-10 PROCEDURE — 36415 COLL VENOUS BLD VENIPUNCTURE: CPT

## 2021-07-10 RX ORDER — ONDANSETRON 2 MG/ML
4 INJECTION INTRAMUSCULAR; INTRAVENOUS EVERY 6 HOURS PRN
Status: DISCONTINUED | OUTPATIENT
Start: 2021-07-10 | End: 2021-07-14 | Stop reason: HOSPADM

## 2021-07-10 RX ORDER — SODIUM CHLORIDE 0.9 % (FLUSH) 0.9 %
5-40 SYRINGE (ML) INJECTION EVERY 12 HOURS SCHEDULED
Status: DISCONTINUED | OUTPATIENT
Start: 2021-07-10 | End: 2021-07-14 | Stop reason: HOSPADM

## 2021-07-10 RX ORDER — LABETALOL HYDROCHLORIDE 5 MG/ML
10 INJECTION, SOLUTION INTRAVENOUS EVERY 6 HOURS PRN
Status: DISCONTINUED | OUTPATIENT
Start: 2021-07-10 | End: 2021-07-11

## 2021-07-10 RX ORDER — HYDRALAZINE HYDROCHLORIDE 20 MG/ML
10 INJECTION INTRAMUSCULAR; INTRAVENOUS EVERY 6 HOURS PRN
Status: DISCONTINUED | OUTPATIENT
Start: 2021-07-10 | End: 2021-07-10

## 2021-07-10 RX ORDER — ALBUTEROL SULFATE 90 UG/1
2 AEROSOL, METERED RESPIRATORY (INHALATION) EVERY 6 HOURS PRN
Status: DISCONTINUED | OUTPATIENT
Start: 2021-07-10 | End: 2021-07-14 | Stop reason: HOSPADM

## 2021-07-10 RX ORDER — CARVEDILOL 6.25 MG/1
12.5 TABLET ORAL 2 TIMES DAILY WITH MEALS
Status: DISCONTINUED | OUTPATIENT
Start: 2021-07-10 | End: 2021-07-14 | Stop reason: HOSPADM

## 2021-07-10 RX ORDER — HYDRALAZINE HYDROCHLORIDE 20 MG/ML
20 INJECTION INTRAMUSCULAR; INTRAVENOUS ONCE
Status: COMPLETED | OUTPATIENT
Start: 2021-07-10 | End: 2021-07-10

## 2021-07-10 RX ORDER — ASPIRIN 81 MG/1
324 TABLET, CHEWABLE ORAL ONCE
Status: COMPLETED | OUTPATIENT
Start: 2021-07-10 | End: 2021-07-10

## 2021-07-10 RX ORDER — IBUPROFEN 400 MG/1
400 TABLET ORAL ONCE
Status: COMPLETED | OUTPATIENT
Start: 2021-07-10 | End: 2021-07-10

## 2021-07-10 RX ORDER — LOSARTAN POTASSIUM 100 MG/1
100 TABLET ORAL DAILY
Status: DISCONTINUED | OUTPATIENT
Start: 2021-07-10 | End: 2021-07-11

## 2021-07-10 RX ORDER — ACETAMINOPHEN 325 MG/1
650 TABLET ORAL EVERY 6 HOURS PRN
Status: DISCONTINUED | OUTPATIENT
Start: 2021-07-10 | End: 2021-07-14 | Stop reason: HOSPADM

## 2021-07-10 RX ORDER — SODIUM CHLORIDE 9 MG/ML
25 INJECTION, SOLUTION INTRAVENOUS PRN
Status: DISCONTINUED | OUTPATIENT
Start: 2021-07-10 | End: 2021-07-14 | Stop reason: HOSPADM

## 2021-07-10 RX ORDER — LABETALOL HYDROCHLORIDE 5 MG/ML
10 INJECTION, SOLUTION INTRAVENOUS EVERY 4 HOURS PRN
Status: CANCELLED | OUTPATIENT
Start: 2021-07-10

## 2021-07-10 RX ORDER — POLYETHYLENE GLYCOL 3350 17 G/17G
17 POWDER, FOR SOLUTION ORAL DAILY PRN
Status: DISCONTINUED | OUTPATIENT
Start: 2021-07-10 | End: 2021-07-14 | Stop reason: HOSPADM

## 2021-07-10 RX ORDER — DOXAZOSIN MESYLATE 4 MG/1
4 TABLET ORAL NIGHTLY
Status: DISCONTINUED | OUTPATIENT
Start: 2021-07-11 | End: 2021-07-13

## 2021-07-10 RX ORDER — ACETAMINOPHEN 650 MG/1
650 SUPPOSITORY RECTAL EVERY 6 HOURS PRN
Status: DISCONTINUED | OUTPATIENT
Start: 2021-07-10 | End: 2021-07-14 | Stop reason: HOSPADM

## 2021-07-10 RX ORDER — ONDANSETRON 4 MG/1
4 TABLET, ORALLY DISINTEGRATING ORAL EVERY 8 HOURS PRN
Status: DISCONTINUED | OUTPATIENT
Start: 2021-07-10 | End: 2021-07-14 | Stop reason: HOSPADM

## 2021-07-10 RX ORDER — DOXAZOSIN MESYLATE 4 MG/1
4 TABLET ORAL EVERY 12 HOURS SCHEDULED
Status: DISCONTINUED | OUTPATIENT
Start: 2021-07-10 | End: 2021-07-10

## 2021-07-10 RX ORDER — SODIUM CHLORIDE 0.9 % (FLUSH) 0.9 %
10 SYRINGE (ML) INJECTION PRN
Status: DISCONTINUED | OUTPATIENT
Start: 2021-07-10 | End: 2021-07-14 | Stop reason: HOSPADM

## 2021-07-10 RX ORDER — ATORVASTATIN CALCIUM 20 MG/1
20 TABLET, FILM COATED ORAL NIGHTLY
Status: DISCONTINUED | OUTPATIENT
Start: 2021-07-10 | End: 2021-07-14 | Stop reason: HOSPADM

## 2021-07-10 RX ADMIN — HYDRALAZINE HYDROCHLORIDE 10 MG: 20 INJECTION INTRAMUSCULAR; INTRAVENOUS at 07:40

## 2021-07-10 RX ADMIN — Medication 10 ML: at 07:40

## 2021-07-10 RX ADMIN — ATORVASTATIN CALCIUM 20 MG: 20 TABLET, FILM COATED ORAL at 22:18

## 2021-07-10 RX ADMIN — HYDRALAZINE HYDROCHLORIDE 20 MG: 20 INJECTION INTRAMUSCULAR; INTRAVENOUS at 04:59

## 2021-07-10 RX ADMIN — Medication 10 ML: at 05:00

## 2021-07-10 RX ADMIN — LOSARTAN POTASSIUM 100 MG: 100 TABLET, FILM COATED ORAL at 08:30

## 2021-07-10 RX ADMIN — Medication 10 ML: at 22:18

## 2021-07-10 RX ADMIN — IBUPROFEN 400 MG: 400 TABLET, FILM COATED ORAL at 10:10

## 2021-07-10 RX ADMIN — DOXAZOSIN 4 MG: 4 TABLET ORAL at 08:30

## 2021-07-10 RX ADMIN — CARVEDILOL 12.5 MG: 6.25 TABLET, FILM COATED ORAL at 17:05

## 2021-07-10 RX ADMIN — Medication 10 ML: at 08:30

## 2021-07-10 RX ADMIN — ASPIRIN 324 MG: 81 TABLET, CHEWABLE ORAL at 01:24

## 2021-07-10 ASSESSMENT — ENCOUNTER SYMPTOMS
DIARRHEA: 0
VOMITING: 0
NAUSEA: 0
SHORTNESS OF BREATH: 1
ABDOMINAL PAIN: 0
CHEST TIGHTNESS: 0

## 2021-07-10 ASSESSMENT — PAIN SCALES - GENERAL
PAINLEVEL_OUTOF10: 0
PAINLEVEL_OUTOF10: 2
PAINLEVEL_OUTOF10: 4
PAINLEVEL_OUTOF10: 0

## 2021-07-10 ASSESSMENT — PAIN DESCRIPTION - LOCATION: LOCATION: HEAD

## 2021-07-10 ASSESSMENT — PAIN DESCRIPTION - PAIN TYPE: TYPE: ACUTE PAIN

## 2021-07-10 ASSESSMENT — HEART SCORE: ECG: 0

## 2021-07-10 NOTE — ED PROVIDER NOTES
I independently performed a history and physical on Dayami Cummings. All diagnostic, treatment, and disposition decisions were made by myself in conjunction with the advanced practice provider. I have participated in the medical decision making and directed the treatment plan and disposition of the patient. For further details of [facility] emergency department encounter, please see the advanced practice provider's documentation. CHIEF COMPLAINT  Chief Complaint   Patient presents with    Hypertension     pt states bp was elevated when he checked it tonight. does take home bp medication. denies headaches but states \"just not feeling right\". Briefly, Dayami Cummings is a 60 y.o. male  who presents to the ED complaining of hypertension at home which was 198/91 when he was just not feeling good at home in a nonspecific way. Felt lightheaded with nausea when he stood up and notes that exertion in particular makes him feel worse. Not specifically having CP but had LUE tingling down to the hand from the chest.  Has had SOB for close to a year but that seems to be getting worse. Cardiac workup a year ago or so was negative per patient report. FOCUSED PHYSICAL EXAMINATION  BP (!) 175/90   Pulse 66   Temp 97.7 °F (36.5 °C) (Oral)   Resp 18   Wt 258 lb (117 kg)   SpO2 97%   BMI 34.98 kg/m²    Focused physical examination notable for no acute distress, well-appearing, well-nourished, normal speech and mentation without obvious facial droop, no obvious rash. No obvious cranial nerve deficits on my initial exam. RRR CTAB, abd soft NTND, no leg swelling.     The 12 lead EKG was interpreted by me as follows:  Rate: normal with a rate of 66  Rhythm: sinus  Axis: normal  Intervals: normal MT, narrow QRS, normal QTc  ST segments: no ST elevations or depressions  T waves: no abnormal inversions  Non-specific T wave changes: not present  Prior EKG comparison: EKG dated 8/15/19 is not significantly different    MDM:  Diagnostic considerations included acute coronary syndrome, pulmonary embolism, COPD/asthma, pneumonia, musculoskeletal, reflux/PUD/gastritis, pneumothorax, CHF, thoracic aortic dissection, anxiety    ED course was notable for concern for anginal type symptomatology. He is hypertensive but his EKG is nonischemic and ED work-up is otherwise negative. He is not hypoxic tachycardic or tachypneic. Chest x-ray is without infiltrate or other acute abnormalities. Heart score is 4. Patient will be admitted. Patient was given aspirin. HEART SCORE:    History: 1  EC  Patient Age: 1  *Risk factors for Atherosclerotic disease: Hypertension;Obesity;Coronary Artery Disease;Hypercholesterolemia  Risk Factors: 2  Troponin: 0  Heart Score Total: 4      Heart score: 4. This falls under the following category: Score of 4-6, which indicates low/moderate risk for major adverse cardiac event and supports observation with repeated troponins and/or non-invasive testing      During the patient's ED course, the patient was given:  Medications   aspirin chewable tablet 324 mg (324 mg Oral Given 7/10/21 0124)        CLINICAL IMPRESSION  1. Anginal equivalent ([address]        DISPOSITION  Amanda Avilez was admitted in fair condition. The plan is to admit to the hospital at this time under the hospitalist service. Hospitalist accepted the patient and will take over the patient's care. This chart was created using Dragon dictation software. Efforts were made by me to ensure accuracy, however some errors may be present due to limitations of this technology.             Heri Cervantes MD  07/10/21 5238

## 2021-07-10 NOTE — ED NOTES
Bed: 12  Expected date:   Expected time:   Means of arrival:   Comments:  Jersey Blair RN  07/10/21 0002

## 2021-07-10 NOTE — PROGRESS NOTES
Assessment complete,  B/P elevated and treated per MAR. Pt refusing a head to toe skin assessment. Pt wants to leave on jeand and socks from home. Pt denies any open areas on his body. Back, chest, and arms observed by this RN with no issues noted. Will continue to monitor.  Ann Silva RN

## 2021-07-10 NOTE — CONSULTS
Office : [phone]     Fax :[phone]       Nephrology Consult Note      Patient's Name: Katie Peterson  12:56 PM  7/10/2021    Reason for Consult:  Labile HTN      Requesting Physician:  Marco Jo MD      Chief Complaint:    Chief Complaint   Patient presents with    Hypertension     pt states bp was elevated when he checked it tonight. does take home bp medication. denies headaches but states \"just not feeling right\". History of Present Ilness:    Katie Peterson is a 60 y.o. male with pmh of hyperlipidemia, essential hypertension, occasionally smokes cigar, obesity with BMI of 34.98 kg/m², who has chronic shortness of breath (ongoing for about 11 months now), who has had 2 negative stress tests over the past 2 years (normal stress echocardiogram in August 2021), who presents to the emergency room with complaints of elevated blood pressure reading. He also reports that he has some numbness in his left forearm, dizziness, both of which he typically gets whenever his blood pressure is elevated (in fact, that is how patient knows that his blood pressure is elevated). Wife with him at bedside   Has been consuming foods with high sodium content   Drink alcohol daily. Mainly wine.  2 drinks on average     C/o dizziness right now        I/O last 3 completed shifts:  In: -   Out: 300 [Urine:300]    Past Medical History:   Diagnosis Date    Hyperlipidemia     Hypertension     Meningitis     02/2007    Smoker        Past Surgical History:   Procedure Laterality Date    HERNIA REPAIR      inguinal hernia , right, age 9       Family History   Problem Relation Age of Onset    Heart Disease Father     Hypertension Mother         reports that he quit smoking about 3 years ago. His smoking use included cigarettes. He has a 15.00 pack-year smoking history. He has never used smokeless tobacco. He reports current alcohol use of about 6.0 standard drinks of alcohol per week. He reports that he does not use drugs.         Allergies:  Hctz [hydrochlorothiazide] and Lisinopril    Current Medications:    atorvastatin (LIPITOR) tablet 20 mg, Nightly  doxazosin (CARDURA) tablet 4 mg, 2 times per day  losartan (COZAAR) tablet 100 mg, Daily  albuterol sulfate  (90 Base) MCG/ACT inhaler 2 puff, Q6H PRN  sodium chloride flush 0.9 % injection 5-40 mL, 2 times per day  sodium chloride flush 0.9 % injection 10 mL, PRN  0.9 % sodium chloride infusion, PRN  enoxaparin (LOVENOX) injection 40 mg, Daily  ondansetron (ZOFRAN-ODT) disintegrating tablet 4 mg, Q8H PRN   Or  ondansetron (ZOFRAN) injection 4 mg, Q6H PRN  polyethylene glycol (GLYCOLAX) packet 17 g, Daily PRN  acetaminophen (TYLENOL) tablet 650 mg, Q6H PRN   Or  acetaminophen (TYLENOL) suppository 650 mg, Q6H PRN  labetalol (NORMODYNE;TRANDATE) injection 10 mg, Q6H PRN        Review of Systems:   14 point ROS obtained but were negative except mentioned in HPI      Physical exam:     Vitals:  BP (!) 158/82   Pulse 72   Temp 97.5 °F (36.4 °C) (Oral)   Resp 16   Ht 6' 2\" (1.88 m)   Wt 235 lb 6.4 oz (106.8 kg)   SpO2 96%   BMI 30.22 kg/m²   Constitutional:  OAA X3 NAD  Skin: no rash, turgor wnl  Heent:  eomi, mmm  Neck: no bruits or jvd noted  Cardiovascular:  S1, S2 without m/r/g  Respiratory: CTA B without w/r/r  Abdomen:  +bs, soft, nt, nd  Ext: no lower extremity edema  Psychiatric: mood and affect appropriate  Musculoskeletal:  Rom, muscular strength intact    Labs:  CBC:   Recent Labs     07/10/21  0049   WBC 7.7   HGB 14.8        BMP:    Recent Labs     07/10/21  0049      K 4.5      CO2 25   BUN 23*   CREATININE 1.2   GLUCOSE 110*     Ca/Mg/Phos:   Recent Labs     07/10/21  0049   CALCIUM 9.5 Hepatic:   Recent Labs     07/10/21  0049   AST 25   ALT 32   BILITOT 0.3   ALKPHOS 103     Troponin:   Recent Labs     07/10/21  0049   TROPONINI <0.01     BNP: No results for input(s): BNP in the last 72 hours. Lipids: No results for input(s): CHOL, TRIG, HDL, LDLCALC, LABVLDL in the last 72 hours. ABGs: No results for input(s): PHART, PO2ART, ZFS3NRT in the last 72 hours. INR: No results for input(s): INR in the last 72 hours. UA:No results for input(s): Haim Raymond, GLUCOSEU, BILIRUBINUR, Beto Bernheim, BLOODU, PHUR, PROTEINU, UROBILINOGEN, NITRU, LEUKOCYTESUR, LABMICR, URINETYPE in the last 72 hours. Urine Microscopic: No results for input(s): LABCAST, BACTERIA, COMU, HYALCAST, WBCUA, RBCUA, EPIU in the last 72 hours. Urine Culture: No results for input(s): LABURIN in the last 72 hours. Urine Chemistry: No results for input(s): Kristin Abelson, PROTEINUR, NAUR in the last 72 hours. IMAGING:  XR CHEST PORTABLE   Final Result   Marginal inspiration, without evidence of acute cardiopulmonary disease               Assessment/Plan :      1. Labile  HTN  Phil recorded /96 with symptoms of headaches and numbness on face     Will add coreg low dose 12.5 mg po bid   Decrease cardura to 4 mg at bedtime only   Continue losartan to 100 mg po daily     Hypertension education given     ? Lose weight (if you are overweight)  ? Choose a diet low in fat and rich in fruits, vegetables, and low-fat dairy products  ? Reduce the amount of salt you eat, avoid sodas. ?Do something active for at least 30 minutes a day on most days of the week  ? Cut down on alcohol (if you drink more than 2 alcoholic drinks per day), if you smoke then try to quit.                 D/w primary team      Thank you for allowing us to participate in care of Erika Lefort         Electronically signed by: Nilda Hillman MD, 7/10/2021, 12:56 PM      [facility] [address]  Office : [phone]  Fax :[phone]

## 2021-07-10 NOTE — H&P
Hospital Medicine History and Physical    7/10/2021    Date of Admission: 7/10/2021    Date of Service: Pt seen/examined on 7/10/2021 and admitted to observation. Assessment/plan:  1. Uncontrolled hypertension. Will make adjustment to antihypertensive medications: Increase Cardura from 4 mg daily to 4 mg twice daily, increased losartan from 50 mg daily to 100 mg daily. Will give a dose of IV hydralazine 20 mg x 1. Will also place on as needed IV hydralazine for systolic blood pressure greater than 150 mmHg. 2. Chronic shortness of breath. Patient has had normal stress test in August 2019. He also had normal stress echocardiogram in August 2020. He has had CT that demonstrates normal coronaries. Presentation is not consistent with ACS. No need for further cardiac testing during this hospitalization. I have discussed with patient about importance of outpatient polysomnography to evaluate for suspected sleep apnea. 3. Other comorbidities: History of hyperlipidemia, obesity with BMI of 34.8 kg/m². Activities: Up with assist  Prophylaxis: Subcutaneous Lovenox  Code status: Full code    ==========================================================  Chief complaint:  Chief Complaint   Patient presents with    Hypertension     pt states bp was elevated when he checked it tonight. does take home bp medication. denies headaches but states \"just not feeling right\". History of Presenting Illness: This is a pleasant 60 y.o. male with history of hyperlipidemia, essential hypertension, prior history of tobacco use (has not used tobacco in about 3 years), obesity with BMI of 34.98 kg/m², who has chronic shortness of breath (ongoing for about 11 months now), who has had 2 negative stress tests over the past 2 years (normal stress echocardiogram in August 2021), who presents to the emergency room with complaints of elevated blood pressure reading.   He also reports that he has some numbness in his left forearm, dizziness, both of which he typically gets whenever his blood pressure is elevated (in fact, that is how patient knows that his blood pressure is elevated). At baseline, reports his systolic blood pressure is mostly above 130 mmHg. He reports compliance with his home medications (takes Cardura 4 mg at night, losartan 50 mg in the morning). He denies chest pain. Again, patient reports shortness of breath is not new, has been going on for about 11 months now, unchanged. Past Medical History:      Diagnosis Date    Hyperlipidemia     Hypertension     Meningitis     02/2007    Smoker        Past Surgical History:      Procedure Laterality Date    HERNIA REPAIR      inguinal hernia , right, age 9       Medications (prior to admission):  Prior to Admission medications    Medication Sig Start Date End Date Taking? Authorizing Provider   VENTOLIN  (90 Base) MCG/ACT inhaler TAKE 2 PUFFS BY MOUTH EVERY 6 HOURS AS NEEDED FOR WHEEZE 4/12/21  Yes Domo Lindsey MD   atorvastatin (LIPITOR) 20 MG tablet Take 1 tablet by mouth daily 3/27/21  Yes Domo Lindsey MD   doxazosin (CARDURA) 4 MG tablet TAKE 1 TABLET BY MOUTH EVERY DAY 12/10/20  Yes Domo Lindsey MD   losartan (COZAAR) 100 MG tablet TAKE 1 TABLET BY MOUTH EVERY DAY  Patient taking differently: Take 50 mg by mouth daily TAKE 1 TABLET BY MOUTH EVERY DAY 10/1/20  Yes Domo Lindsey MD   Blood Pressure KIT Check BP twice a week/HTN 10/14/19  Yes Martin Garcia MD       Allergy(ies):  Hctz [hydrochlorothiazide] and Lisinopril    Social History:  TOBACCO:  reports that he quit smoking about 3 years ago. His smoking use included cigarettes. He has a 15.00 pack-year smoking history. He has never used smokeless tobacco.  ETOH:  reports current alcohol use of about 6.0 standard drinks of alcohol per week.     Family History:      Problem Relation Age of Onset    Heart Disease Father     Hypertension Mother        Review of Systems:  Pertinent positives are listed in HPI. At least 10-point ROS reviewed and were negative. Vitals and physical examination:  BP (!) 175/90   Pulse 66   Temp 97.7 °F (36.5 °C) (Oral)   Resp 18   Wt 258 lb (117 kg)   SpO2 97%   BMI 34.98 kg/m²   Gen/overall appearance: Not in acute distress. Alert. Oriented x3. Head: Normocephalic, atraumatic  Eyes: EOMI, good acuity  ENT: Oral mucosa moist  Neck: No JVD, thyromegaly  CVS: Nml S1S2, no MRG, RRR  Pulm: Clear bilaterally. No crackles/wheezes  Gastrointestinal: Soft, NT/ND, +BS  Musculoskeletal: No edema. Warm  Neuro: No focal deficit. Moves extremity spontaneously. Psychiatry: Appropriate affect. Not agitated. Skin: Warm, dry with normal turgor. No rash  Capillary refill: Brisk,< 3 seconds   Peripheral Pulses: +2 palpable, equal bilaterally       Labs/imaging/EKG:  CBC:   Recent Labs     07/10/21  0049   WBC 7.7   HGB 14.8        BMP:    Recent Labs     07/10/21  0049      K 4.5      CO2 25   BUN 23*   CREATININE 1.2   GLUCOSE 110*     Hepatic:   Recent Labs     07/10/21  0049   AST 25   ALT 32   BILITOT 0.3   ALKPHOS 103       XR CHEST PORTABLE    Result Date: 7/10/2021  EXAMINATION: ONE XRAY VIEW OF THE CHEST 7/9/2021 6:42 pm COMPARISON: August 11, 2019 HISTORY: ORDERING SYSTEM PROVIDED HISTORY: SOB TECHNOLOGIST PROVIDED HISTORY: Reason for exam:->SOB Reason for Exam: Hypertension (pt states bp was elevated when he checked it tonight. does take home bp medication. denies headaches but states \"just not feeling right\". ) Acuity: Acute Type of Exam: Initial FINDINGS: Marginal inspiration is present. Heart size and mediastinal contours appear normal for the level of inspiration. Vascular markings are distinct. No pneumothorax is noted. Osseous structures are stable. Marginal inspiration, without evidence of acute cardiopulmonary disease       EKG: Normal sinus rhythm with no acute ST/T changes. I reviewed EKG.     Discussed with ER provider.       Thank you Alfredo Coburn MD for the opportunity to be involved in this patient's care.    -----------------------------  Rena Mc MD  [facility]

## 2021-07-10 NOTE — PLAN OF CARE
Problem: Safety:  Goal: Free from accidental physical injury  Description: Free from accidental physical injury  Outcome: Ongoing  Note: Patient remains absent from falls at this time. Remains alert and oriented, in bed with call light and belongings in reach. Non-slip footwear on and 2/4 siderails raised. Bed remains in lowest/locked position at all times. Fall precautions in place. Patient encouraged to use call light to request assistance, v/u.  Will continue to monitor. Problem: Pain:  Goal: Patient's pain/discomfort is manageable  Description: Patient's pain/discomfort is manageable  7/10/2021 0835 by Agata Bliss RN  Outcome: Ongoing  Note: Patient denies any pain at this time. Will continue to monitor.

## 2021-07-10 NOTE — PLAN OF CARE
Problem: Pain:  Goal: Patient's pain/discomfort is manageable  Description: Patient's pain/discomfort is manageable  Outcome: Met This Shift    Pt vss,  pt denies pain. Will continue to monitor.  Katie Castillo RN

## 2021-07-10 NOTE — PROGRESS NOTES
4 Eyes Skin Assessment     The patient is being assess for  Admission    I agree that 2 RN's have performed a thorough Head to Toe Skin Assessment on the patient. ALL assessment sites listed below have been assessed. Areas assessed by both nurses:   [x]   Head, Face, and Ears   [x]   Shoulders, Back, and Chest  [x]   Arms, Elbows, and Hands   []   Coccyx, Sacrum, and Ischum  []   Legs, Feet, and Heels        Does the Patient have Skin Breakdown?   No         Mason Prevention initiated:  NA   Wound Care Orders initiated:  NA      WOC nurse consulted for Pressure Injury (Stage 3,4, Unstageable, DTI, NWPT, and Complex wounds):  NA      Nurse 1 eSignature: Electronically signed by Teresa Hein RN on 7/10/21 at 8:57 AM EDT    **SHARE this note so that the co-signing nurse is able to place an eSignature**    Nurse 2 eSignature: Electronically signed by Agata Bliss RN on 7/10/21 at 1:35 PM EDT

## 2021-07-10 NOTE — ED PROVIDER NOTES
[facility]        Pt Name: Amanda Avilez  MRN: [id number]  [place] 1965  Date of evaluation: 7/9/2021  Provider: DEEPTI Holder - CNP  PCP: Chivo Tellez MD  Note Started: 12:36 AM EDT        I have seen and evaluated this patient with my supervising physician Ravinder Thomas       Chief Complaint   Patient presents with    Hypertension     pt states bp was elevated when he checked it tonight. does take home bp medication. denies headaches but states \"just not feeling right\". HISTORY OF PRESENT ILLNESS   (Location, Timing/Onset, Context/Setting, Quality, Duration, Modifying Factors, Severity, Associated Signs and Symptoms)  Note limiting factors. Chief Complaint: elevated blood pressure     Amanda Avilez is a 60 y.o. male who presents to the emergency department complaining of high blood pressure and several other symptoms. States that he was feeling \"off\" measures blood pressure tonight. Reports systolic pressure at almost 483 and diastolic pressure at almost 100. Reports that he is taking his medications as directed. No new blood pressure medications have been added or taken away. He did have dental work earlier in the week and is on antibiotics and was recently placed on a statin. He does follow with Dr. Matthew Ledezma. Reports that he has had some dizziness/lightheadedness with position changes over the past several weeks and has been fatigued for more than a month. He reports decreased sensation with a numbness/pins-and-needles to the left forearm into the left hand. Shortness of breath with exertion, especially walking up the stairs at home. And twinges of pain to his chest.  Reports that he has been worked up multiple times for this problem and generally has problems with his blood pressure in the summer.   He was unable to see cardiology due to decreased scheduling during the pandemic. He denies current chest pain or shortness of breath while at rest.    Denies any headache, fever, visual disturbances. No neck or back pain. No abdominal pain, nausea, vomiting, diarrhea, constipation, or dysuria. No rash. Nursing Notes were all reviewed and agreed with or any disagreements were addressed in the HPI. REVIEW OF SYSTEMS    (2-9 systems for level 4, 10 or more for level 5)     Review of Systems   Constitutional: Positive for fatigue. Negative for activity change, chills and fever. Respiratory: Positive for shortness of breath. Negative for chest tightness. Cardiovascular: Positive for chest pain. Gastrointestinal: Negative for abdominal pain, diarrhea, nausea and vomiting. Genitourinary: Negative for dysuria. Neurological: Positive for dizziness and light-headedness. All other systems reviewed and are negative. Positives and Pertinent negatives as per HPI. Except as noted above in the ROS, all other systems were reviewed and negative.        PAST MEDICAL HISTORY     Past Medical History:   Diagnosis Date    Hyperlipidemia     Hypertension     Meningitis     02/2007    Smoker          SURGICAL HISTORY     Past Surgical History:   Procedure Laterality Date    HERNIA REPAIR      inguinal hernia , right, age 9         CURRENTMEDICATIONS       Previous Medications    ATORVASTATIN (LIPITOR) 20 MG TABLET    Take 1 tablet by mouth daily    BLOOD PRESSURE KIT    Check BP twice a week/HTN    DOXAZOSIN (CARDURA) 4 MG TABLET    TAKE 1 TABLET BY MOUTH EVERY DAY    LOSARTAN (COZAAR) 100 MG TABLET    TAKE 1 TABLET BY MOUTH EVERY DAY    VENTOLIN  (90 BASE) MCG/ACT INHALER    TAKE 2 PUFFS BY MOUTH EVERY 6 HOURS AS NEEDED FOR WHEEZE         ALLERGIES     Hctz [hydrochlorothiazide] and Lisinopril    FAMILYHISTORY       Family History   Problem Relation Age of Onset    Heart Disease Father     Hypertension Mother           SOCIAL HISTORY       Social History     Tobacco Use    Smoking status: Former Smoker     Packs/day: 0.50     Years: 30.00     Pack years: 15.00     Types: Cigarettes     Quit date: 2018     Years since quitting: 3.5    Smokeless tobacco: Never Used   Vaping Use    Vaping Use: Never used   Substance Use Topics    Alcohol use: Yes     Alcohol/week: 6.0 standard drinks     Types: 6 Standard drinks or equivalent per week     Comment: occasional drinker    Drug use: No       SCREENINGS      Heart Score for chest pain patients  History: Moderately Suspicious  ECG: Normal  Patient Age: > 39 and < 65 years  *Risk factors for Atherosclerotic disease: Hypertension, Obesity, Coronary Artery Disease, Hypercholesterolemia  Risk Factors: > 3 Risk factors or history of atherosclerotic disease*  Troponin: < 1X normal limit  Heart Score Total: 4      PHYSICAL EXAM    (up to 7 for level 4, 8 or more for level 5)     ED Triage Vitals   BP Temp Temp Source Pulse Resp SpO2 Height Weight   07/09/21 2348 07/09/21 2347 07/09/21 2347 07/09/21 2347 07/09/21 2347 07/09/21 2347 -- 07/09/21 2347   (!) 195/92 97.7 °F (36.5 °C) Oral 74 18 97 %  258 lb (117 kg)       Physical Exam  Vitals and nursing note reviewed. Constitutional:       Appearance: He is well-developed. He is not diaphoretic. HENT:      Head: Normocephalic and atraumatic. Right Ear: External ear normal.      Left Ear: External ear normal.   Eyes:      General:         Right eye: No discharge. Left eye: No discharge. Neck:      Vascular: No JVD. Cardiovascular:      Rate and Rhythm: Normal rate and regular rhythm. Pulses: Normal pulses. Heart sounds: No murmur heard. Pulmonary:      Effort: Pulmonary effort is normal. No respiratory distress. Breath sounds: Normal breath sounds. Abdominal:      Palpations: Abdomen is soft. Musculoskeletal:         General: Normal range of motion. Cervical back: Normal range of motion and neck supple. Skin:     General: Skin is warm and dry. Coloration: Skin is not pale. Neurological:      Mental Status: He is alert and oriented to person, place, and time. Psychiatric:         Behavior: Behavior normal.         DIAGNOSTIC RESULTS   LABS:    Labs Reviewed   COMPREHENSIVE METABOLIC PANEL - Abnormal; Notable for the following components:       Result Value    Glucose 110 (*)     BUN 23 (*)     All other components within normal limits    Narrative:     Performed at:  [facility]  [address],  [place], [address] [employer]   Phone [phone]   CBC WITH AUTO DIFFERENTIAL    Narrative:     Performed at:  [facility] [address],  [place], [address] [employer]   Phone [phone]   TROPONIN    Narrative:     Performed at:  [facility] [address],  [place], [address] [employer]   Phone [phone] PEPTIDE    Narrative:     Performed at:  [facility] [address],  [place], [address] [employer]   Phone [phone]       When ordered only abnormal lab results are displayed. All other labs were within normal range or not returned as of this dictation. EKG: When ordered, EKG's are interpreted by the Emergency Department Physician in the absence of a cardiologist.  Please see their note for interpretation of EKG. RADIOLOGY:   Non-plain film images such as CT, Ultrasound and MRI are read by the radiologist. Plain radiographic images are visualized and preliminarily interpreted by the ED Provider with the below findings:        Interpretation per the Radiologist below, if available at the time of this note:    XR CHEST PORTABLE   Final Result   Marginal inspiration, without evidence of acute cardiopulmonary disease           No results found.         PROCEDURES   Unless otherwise noted below, none     Procedures    CRITICAL CARE TIME   N/A    CONSULTS:  IP CONSULT TO HOSPITALIST      EMERGENCY DEPARTMENT COURSE and DIFFERENTIAL DIAGNOSIS/MDM:   Vitals:    Vitals:    07/09/21 2347 07/09/21 2348 07/10/21 0037 07/10/21 0230   BP:  (!) 195/92 (!) 175/90 (!) 162/94   Pulse: 74  66 62   Resp: 18      Temp: 97.7 °F (36.5 °C)      TempSrc: Oral      SpO2: 97%  97% 94%   Weight: 258 lb (117 kg)          Patient was given the following medications:  Medications   hydrALAZINE (APRESOLINE) injection 20 mg (has no administration in time range)   aspirin chewable tablet 324 mg (324 mg Oral Given 7/10/21 0124)           Briefly, this is a 60year old male that presents to the emergency department with complaint of elevated blood pressure. Reports that he has had multiple other symptoms over the past several weeks. He has been more fatigued than normal with some dizziness and lightheadedness with position changes. Numbness/pins and needle sensation to the left forearm traveling into the left hand. Difficulty walking up the stairs due to shortness of breath and some \"twinges\" of pain to his chest.  The patient is currently chest pain-free. Patient is on Cardura and Cozaar for blood pressure. He did take these medications today as directed. XR CHEST PORTABLE (Final result)  Result time 07/10/21 01:01:36  Final result by Leslie Solo DO (07/10/21 01:01:36)                Impression:    Marginal inspiration, without evidence of acute cardiopulmonary disease               Labs are unremarkable. He was given aspirin in the ER. Patient will be admitted for chest pain rule out and accelerated hypertension. He is admitted to hospitalist services. Patient is agreeable regarding plan of care. FINAL IMPRESSION      1. Anginal equivalent ([facility] [address]          DISPOSITION/PLAN   DISPOSITION Admitted 07/10/2021 02:35:17 AM      PATIENT REFERRED TO:  No follow-up provider specified.     DISCHARGE MEDICATIONS:  New Prescriptions    No medications on file       DISCONTINUED MEDICATIONS:  Discontinued Medications    No medications on file (Please note that portions of this note were completed with a voice recognition program.  Efforts were made to edit the dictations but occasionally words are mis-transcribed.)    DEEPTI Contreras CNP (electronically signed)            DEEPTI Contreras CNP  07/10/21 7349

## 2021-07-10 NOTE — PROGRESS NOTES
Noticed blood pressure is still elevated. Patient is yet to receive the hydralazine 20 mg x 1 that was ordered at 2:45 AM.  Nursing communication sent to complete order.     SIGNED: Therese Zamudio MD, MD . 7/10/2021

## 2021-07-11 PROBLEM — I16.1 HYPERTENSIVE EMERGENCY: Status: ACTIVE | Noted: 2021-07-11

## 2021-07-11 LAB
ANION GAP SERPL CALCULATED.3IONS-SCNC: 11 MMOL/L (ref 3–16)
BUN BLDV-MCNC: 21 MG/DL (ref 7–20)
CALCIUM SERPL-MCNC: 9.7 MG/DL (ref 8.3–10.6)
CHLORIDE BLD-SCNC: 104 MMOL/L (ref 99–110)
CO2: 24 MMOL/L (ref 21–32)
CREAT SERPL-MCNC: 1.4 MG/DL (ref 0.9–1.3)
EKG ATRIAL RATE: 58 BPM
EKG DIAGNOSIS: NORMAL
EKG P AXIS: 34 DEGREES
EKG P-R INTERVAL: 128 MS
EKG Q-T INTERVAL: 412 MS
EKG QRS DURATION: 84 MS
EKG QTC CALCULATION (BAZETT): 404 MS
EKG R AXIS: 40 DEGREES
EKG T AXIS: 38 DEGREES
EKG VENTRICULAR RATE: 58 BPM
GFR AFRICAN AMERICAN: >60
GFR NON-AFRICAN AMERICAN: 52
GLUCOSE BLD-MCNC: 109 MG/DL (ref 70–99)
POTASSIUM SERPL-SCNC: 4.7 MMOL/L (ref 3.5–5.1)
PRO-BNP: 12 PG/ML (ref 0–124)
SODIUM BLD-SCNC: 139 MMOL/L (ref 136–145)
TROPONIN: <0.01 NG/ML

## 2021-07-11 PROCEDURE — 2000000000 HC ICU R&B

## 2021-07-11 PROCEDURE — 2500000003 HC RX 250 WO HCPCS: Performed by: INTERNAL MEDICINE

## 2021-07-11 PROCEDURE — 6360000002 HC RX W HCPCS: Performed by: INTERNAL MEDICINE

## 2021-07-11 PROCEDURE — 2580000003 HC RX 258: Performed by: INTERNAL MEDICINE

## 2021-07-11 PROCEDURE — 83880 ASSAY OF NATRIURETIC PEPTIDE: CPT

## 2021-07-11 PROCEDURE — 96375 TX/PRO/DX INJ NEW DRUG ADDON: CPT

## 2021-07-11 PROCEDURE — 6370000000 HC RX 637 (ALT 250 FOR IP): Performed by: INTERNAL MEDICINE

## 2021-07-11 PROCEDURE — 80048 BASIC METABOLIC PNL TOTAL CA: CPT

## 2021-07-11 PROCEDURE — 6370000000 HC RX 637 (ALT 250 FOR IP): Performed by: NURSE PRACTITIONER

## 2021-07-11 PROCEDURE — 93005 ELECTROCARDIOGRAM TRACING: CPT | Performed by: INTERNAL MEDICINE

## 2021-07-11 PROCEDURE — 93010 ELECTROCARDIOGRAM REPORT: CPT | Performed by: INTERNAL MEDICINE

## 2021-07-11 PROCEDURE — 99232 SBSQ HOSP IP/OBS MODERATE 35: CPT | Performed by: INTERNAL MEDICINE

## 2021-07-11 PROCEDURE — 84484 ASSAY OF TROPONIN QUANT: CPT

## 2021-07-11 PROCEDURE — 36415 COLL VENOUS BLD VENIPUNCTURE: CPT

## 2021-07-11 RX ORDER — LANOLIN ALCOHOL/MO/W.PET/CERES
3 CREAM (GRAM) TOPICAL NIGHTLY PRN
Status: DISCONTINUED | OUTPATIENT
Start: 2021-07-11 | End: 2021-07-14 | Stop reason: HOSPADM

## 2021-07-11 RX ORDER — HYDRALAZINE HYDROCHLORIDE 25 MG/1
50 TABLET, FILM COATED ORAL EVERY 8 HOURS SCHEDULED
Status: DISCONTINUED | OUTPATIENT
Start: 2021-07-11 | End: 2021-07-12

## 2021-07-11 RX ORDER — HYDRALAZINE HYDROCHLORIDE 20 MG/ML
10 INJECTION INTRAMUSCULAR; INTRAVENOUS EVERY 6 HOURS PRN
Status: DISCONTINUED | OUTPATIENT
Start: 2021-07-11 | End: 2021-07-14 | Stop reason: HOSPADM

## 2021-07-11 RX ORDER — HYDRALAZINE HYDROCHLORIDE 25 MG/1
25 TABLET, FILM COATED ORAL EVERY 8 HOURS SCHEDULED
Status: DISCONTINUED | OUTPATIENT
Start: 2021-07-11 | End: 2021-07-11

## 2021-07-11 RX ADMIN — CARVEDILOL 12.5 MG: 6.25 TABLET, FILM COATED ORAL at 08:36

## 2021-07-11 RX ADMIN — HYDRALAZINE HYDROCHLORIDE 25 MG: 25 TABLET, FILM COATED ORAL at 15:46

## 2021-07-11 RX ADMIN — Medication 10 ML: at 19:21

## 2021-07-11 RX ADMIN — DEXTROSE MONOHYDRATE 5 MG/HR: 50 INJECTION, SOLUTION INTRAVENOUS at 19:21

## 2021-07-11 RX ADMIN — CARVEDILOL 12.5 MG: 6.25 TABLET, FILM COATED ORAL at 17:49

## 2021-07-11 RX ADMIN — LOSARTAN POTASSIUM 100 MG: 100 TABLET, FILM COATED ORAL at 08:36

## 2021-07-11 RX ADMIN — ATORVASTATIN CALCIUM 20 MG: 20 TABLET, FILM COATED ORAL at 19:41

## 2021-07-11 RX ADMIN — ASPIRIN 325 MG: 325 TABLET, COATED ORAL at 08:36

## 2021-07-11 RX ADMIN — Medication 10 ML: at 09:40

## 2021-07-11 RX ADMIN — LABETALOL HYDROCHLORIDE 10 MG: 5 INJECTION INTRAVENOUS at 17:42

## 2021-07-11 ASSESSMENT — PAIN SCALES - GENERAL
PAINLEVEL_OUTOF10: 0

## 2021-07-11 NOTE — PROGRESS NOTES
Assessment complete, VSS. Pt c/o chest pain 2/10. Pt states that it does not radiate anywhere and denies SOB. EKG ordered per protocol. EKG resulted (SB),  Hospitalist notified. Pt denies any other needs at this time, will continue to monitor.  Jarred Devries RN

## 2021-07-11 NOTE — PROGRESS NOTES
HOSPITALISTS PROGRESS NOTE    7/11/2021 1:06 PM        Name: Bob Cruz . Admitted: 7/10/2021  Primary Care Provider: Deanna Fierro MD (Tel: [phone])      CC: Hypertension    Brief Course: This  60 y.o. male with history of hyperlipidemia, essential hypertension, prior history of tobacco use (has not used tobacco in about 3 years), obesity with BMI of 34.98 kg/m², who has chronic shortness of breath (ongoing for about 11 months now), who has had 2 negative stress tests over the past 2 years (normal stress echocardiogram in August 2021), who presents to the emergency room with complaints of elevated BP. Interval history:   Pt seen and examined today   Overnight events noted and interval ancillary notes   denied any fevers, chills, chest pain, palpitations, cough, SOB, dizziness, blurry vision, bowel or bladder dysfunction, any focal sensory or motor deficits. Assessment & Plan:    Uncontrolled hypertension:   On Cardura and losartan at home  Reports complete compliance with pain medication  Continue Cardura; losartan was discontinued due to TAYLOR  Started on hydralazine 25 mg 3 times daily  Monitor blood pressure closely  Renal profile daily    Chronic shortness of breath: Chronic issue   Underwent  stress test in August 2019  Which is normal   had normal stress echocardiogram in August 2020. Will order outpatient sleep study to evaluate for RAMBO as patient's wife reports patient snores loudly at night    Hyperlipidemia: Not on any medications  Liver profile obtained on 7/10/2021  ASCVD score of 17.7%  Started on low-dose statins    obesity with BMI of 34.8 kg/m². DVT PPX:   Code:Full Code  Diet: ADULT DIET; Regular;  No Added Salt (3-4 gm)    Disposition: Follow-up when medically stable     Current Medications  hydrALAZINE (APRESOLINE) tablet 25 mg, 3 times per day  atorvastatin (LIPITOR) tablet 20 mg, Nightly  albuterol sulfate  (90 Base) MCG/ACT inhaler 2 puff, Q6H PRN  sodium chloride flush 0.9 % injection 5-40 mL, 2 times per day  sodium chloride flush 0.9 % injection 10 mL, PRN  0.9 % sodium chloride infusion, PRN  enoxaparin (LOVENOX) injection 40 mg, Daily  ondansetron (ZOFRAN-ODT) disintegrating tablet 4 mg, Q8H PRN   Or  ondansetron (ZOFRAN) injection 4 mg, Q6H PRN  polyethylene glycol (GLYCOLAX) packet 17 g, Daily PRN  acetaminophen (TYLENOL) tablet 650 mg, Q6H PRN   Or  acetaminophen (TYLENOL) suppository 650 mg, Q6H PRN  labetalol (NORMODYNE;TRANDATE) injection 10 mg, Q6H PRN  doxazosin (CARDURA) tablet 4 mg, Nightly  carvedilol (COREG) tablet 12.5 mg, BID WC  aspirin EC tablet 325 mg, Daily        Objective:  BP (!) 161/73   Pulse 62   Temp 98.2 °F (36.8 °C) (Oral)   Resp 16   Ht 6' 2\" (1.88 m)   Wt 224 lb 1.6 oz (101.7 kg)   SpO2 98%   BMI 28.77 kg/m²   No intake or output data in the 24 hours ending 07/11/21 1306   Wt Readings from Last 3 Encounters:   07/11/21 224 lb 1.6 oz (101.7 kg)   12/15/20 258 lb (117 kg)   11/11/20 251 lb (113.9 kg)       Physical Examination:   General appearance: No apparent distress appears stated age and cooperative. HEENT Normal cephalic, atraumatic without obvious deformity. Pupils equal, round, and reactive to light. Extra ocular muscles intact. Conjunctivae/corneas clear. Lungs: Clear to auscultation, bilaterally without Rales/Wheezes/Rhonchi with good respiratory effort. Heart: Regular rate and rhythm with Normal S1/S2 without murmurs, rubs or gallops, point of maximum impulse non-displaced  Abdomen: Soft, non-tender or non-distended without rigidity or guarding and positive bowel sounds all four quadrants. Extremities: No clubbing, cyanosis, or edema bilaterally. Full range of motion without deformity and normal gait intact.   Neurologic: Alert and oriented X 3, neurovascularly intact with sensory/motor intact upper extremities/lower extremities, bilaterally. Cranial nerves: II-XII intact, grossly non-focal.  Psychiatry: Appropriate affect. Not agitated  Skin: Warm, dry, normal turgor, no rash  Brisk capillary refill, peripheral pulses palpable     Labs and Tests:  CBC:   Recent Labs     07/10/21  0049   WBC 7.7   HGB 14.8        BMP:    Recent Labs     07/10/21  0049 07/11/21  0456    139   K 4.5 4.7    104   CO2 25 24   BUN 23* 21*   CREATININE 1.2 1.4*   GLUCOSE 110* 109*     Hepatic:   Recent Labs     07/10/21  0049   AST 25   ALT 32   BILITOT 0.3   ALKPHOS 103     XR CHEST PORTABLE   Final Result   Marginal inspiration, without evidence of acute cardiopulmonary disease             Problem List  Active Problems:    Uncontrolled hypertension  Resolved Problems:    * No resolved hospital problems.  Clarisa Easley MD   7/11/2021 1:06 PM

## 2021-07-11 NOTE — PLAN OF CARE
Problem: Safety:  Goal: Free from intentional harm  Description: Free from intentional harm  Outcome: Ongoing     Problem: Pain:  Goal: Pain level will decrease  Description: Pain level will decrease  Outcome: Ongoing

## 2021-07-11 NOTE — PROGRESS NOTES
Office : [phone]     Fax :[phone]       Nephrology progress  Note      Patient's Name: Chayito Frausto  11:11 AM  7/11/2021    Reason for Consult:  Labile HTN      Chief Complaint:    Chief Complaint   Patient presents with    Hypertension     pt states bp was elevated when he checked it tonight. does take home bp medication. denies headaches but states \"just not feeling right\". History of Present Ilness:    Chayito Frausto is a 60 y.o. male with pmh of hyperlipidemia, essential hypertension, occasionally smokes cigar, obesity with BMI of 34.98 kg/m², who has chronic shortness of breath (ongoing for about 11 months now), who has had 2 negative stress tests over the past 2 years (normal stress echocardiogram in August 2021), who presents to the emergency room with complaints of elevated blood pressure reading. He also reports that he has some numbness in his left forearm, dizziness, both of which he typically gets whenever his blood pressure is elevated (in fact, that is how patient knows that his blood pressure is elevated). Wife with him at bedside   Has been consuming foods with high sodium content   Drink alcohol daily. Mainly wine. 2 drinks on average     C/o dizziness right now     Interval history. Creatinine level increased from 1.2 to 1.4  Blood pressure labile.     Systolic blood pressure 441 at this time     No chest pain  No shortness of breath  No edema    I/O last 3 completed shifts:  In: -   Out: 700 [Urine:700]    Past Medical History:   Diagnosis Date    Hyperlipidemia     Hypertension     Meningitis     02/2007    Smoker        Past Surgical History:   Procedure Laterality Date    HERNIA REPAIR      inguinal hernia , right, age 9 Family History   Problem Relation Age of Onset    Heart Disease Father     Hypertension Mother         reports that he quit smoking about 3 years ago. His smoking use included cigarettes. He has a 15.00 pack-year smoking history. He has never used smokeless tobacco. He reports current alcohol use of about 6.0 standard drinks of alcohol per week. He reports that he does not use drugs.         Allergies:  Hctz [hydrochlorothiazide] and Lisinopril    Current Medications:    atorvastatin (LIPITOR) tablet 20 mg, Nightly  losartan (COZAAR) tablet 100 mg, Daily  albuterol sulfate  (90 Base) MCG/ACT inhaler 2 puff, Q6H PRN  sodium chloride flush 0.9 % injection 5-40 mL, 2 times per day  sodium chloride flush 0.9 % injection 10 mL, PRN  0.9 % sodium chloride infusion, PRN  enoxaparin (LOVENOX) injection 40 mg, Daily  ondansetron (ZOFRAN-ODT) disintegrating tablet 4 mg, Q8H PRN   Or  ondansetron (ZOFRAN) injection 4 mg, Q6H PRN  polyethylene glycol (GLYCOLAX) packet 17 g, Daily PRN  acetaminophen (TYLENOL) tablet 650 mg, Q6H PRN   Or  acetaminophen (TYLENOL) suppository 650 mg, Q6H PRN  labetalol (NORMODYNE;TRANDATE) injection 10 mg, Q6H PRN  doxazosin (CARDURA) tablet 4 mg, Nightly  carvedilol (COREG) tablet 12.5 mg, BID WC  aspirin EC tablet 325 mg, Daily            Physical exam:     Vitals:  BP (!) 161/73   Pulse 62   Temp 98.2 °F (36.8 °C) (Oral)   Resp 16   Ht 6' 2\" (1.88 m)   Wt 224 lb 1.6 oz (101.7 kg)   SpO2 98%   BMI 28.77 kg/m²   Constitutional:  OAA X3 NAD  Skin: no rash, turgor wnl  Heent:  eomi, mmm  Neck: no bruits or jvd noted  Cardiovascular:  S1, S2 without m/r/g  Respiratory: CTA B without w/r/r  Abdomen:  +bs, soft, nt, nd  Ext: no lower extremity edema  Psychiatric: mood and affect appropriate  Musculoskeletal:  Rom, muscular strength intact    Labs:  CBC:   Recent Labs     07/10/21  0049   WBC 7.7   HGB 14.8        BMP:    Recent Labs     07/10/21  0049 07/11/21  0456    139   K 4.5 4.7    104   CO2 25 24   BUN 23* 21*   CREATININE 1.2 1.4*   GLUCOSE 110* 109*     Ca/Mg/Phos:   Recent Labs     07/10/21  0049 07/11/21  0456   CALCIUM 9.5 9.7     Hepatic:   Recent Labs     07/10/21  0049   AST 25   ALT 32   BILITOT 0.3   ALKPHOS 103     Troponin:   Recent Labs     07/10/21  0049 07/10/21  2333 07/11/21  0456   TROPONINI <0.01 <0.01 <0.01     BNP: No results for input(s): BNP in the last 72 hours. Lipids: No results for input(s): CHOL, TRIG, HDL, LDLCALC, LABVLDL in the last 72 hours. ABGs: No results for input(s): PHART, PO2ART, DFW2NJS in the last 72 hours. INR: No results for input(s): INR in the last 72 hours. UA:No results for input(s): Fátima Ends, GLUCOSEU, BILIRUBINUR, [place], BLOODU, PHUR, PROTEINU, UROBILINOGEN, NITRU, LEUKOCYTESUR, LABMICR, URINETYPE in the last 72 hours. Urine Microscopic: No results for input(s): LABCAST, BACTERIA, COMU, HYALCAST, WBCUA, RBCUA, EPIU in the last 72 hours. Urine Culture: No results for input(s): LABURIN in the last 72 hours. Urine Chemistry: No results for input(s): Hassie Home, PROTEINUR, NAUR in the last 72 hours. IMAGING:  XR CHEST PORTABLE   Final Result   Marginal inspiration, without evidence of acute cardiopulmonary disease               Assessment/Plan :      1. Labile  HTN  Blood pressure better controlled but systolic blood pressure still in 160s   coreg 12.5 mg po bid   Decrease cardura to 4 mg at bedtime only   This creatinine level increased to 1.4 we will hold losartan    Start hydralazine 25 mg p.o. 3 times daily  Hypertension education given     ? Lose weight (if you are overweight)  ? Choose a diet low in fat and rich in fruits, vegetables, and low-fat dairy products  ? Reduce the amount of salt you eat, avoid sodas. ?Do something active for at least 30 minutes a day on most days of the week  ? Cut down on alcohol (if you drink more than 2 alcoholic drinks per day), if you smoke then try to quit.      Renin/ aldosterone level in a.m.   Check renal Doppler study as outpatient  Discussed with him about regular follow-up      D/w primary team.  Possible discharge tomorrow      Thank you for allowing us to participate in care of Nikita Mayfield         Electronically signed by: Jomar Mustafa MD, 7/11/2021, 11:11 AM      [facility]  Office : [phone]  Fax :[phone]

## 2021-07-12 LAB
ANION GAP SERPL CALCULATED.3IONS-SCNC: 11 MMOL/L (ref 3–16)
BUN BLDV-MCNC: 20 MG/DL (ref 7–20)
CALCIUM SERPL-MCNC: 9.6 MG/DL (ref 8.3–10.6)
CHLORIDE BLD-SCNC: 102 MMOL/L (ref 99–110)
CO2: 25 MMOL/L (ref 21–32)
CREAT SERPL-MCNC: 1.4 MG/DL (ref 0.9–1.3)
EKG ATRIAL RATE: 56 BPM
EKG DIAGNOSIS: NORMAL
EKG P AXIS: -17 DEGREES
EKG P-R INTERVAL: 128 MS
EKG Q-T INTERVAL: 408 MS
EKG QRS DURATION: 80 MS
EKG QTC CALCULATION (BAZETT): 393 MS
EKG R AXIS: 22 DEGREES
EKG T AXIS: 33 DEGREES
EKG VENTRICULAR RATE: 56 BPM
GFR AFRICAN AMERICAN: >60
GFR NON-AFRICAN AMERICAN: 52
GLUCOSE BLD-MCNC: 101 MG/DL (ref 70–99)
POTASSIUM SERPL-SCNC: 4.5 MMOL/L (ref 3.5–5.1)
SODIUM BLD-SCNC: 138 MMOL/L (ref 136–145)

## 2021-07-12 PROCEDURE — 36415 COLL VENOUS BLD VENIPUNCTURE: CPT

## 2021-07-12 PROCEDURE — 6370000000 HC RX 637 (ALT 250 FOR IP): Performed by: INTERNAL MEDICINE

## 2021-07-12 PROCEDURE — 99232 SBSQ HOSP IP/OBS MODERATE 35: CPT | Performed by: INTERNAL MEDICINE

## 2021-07-12 PROCEDURE — 2580000003 HC RX 258: Performed by: INTERNAL MEDICINE

## 2021-07-12 PROCEDURE — 99222 1ST HOSP IP/OBS MODERATE 55: CPT | Performed by: INTERNAL MEDICINE

## 2021-07-12 PROCEDURE — 2000000000 HC ICU R&B

## 2021-07-12 PROCEDURE — 6370000000 HC RX 637 (ALT 250 FOR IP): Performed by: NURSE PRACTITIONER

## 2021-07-12 PROCEDURE — 80048 BASIC METABOLIC PNL TOTAL CA: CPT

## 2021-07-12 PROCEDURE — 84244 ASSAY OF RENIN: CPT

## 2021-07-12 PROCEDURE — 82088 ASSAY OF ALDOSTERONE: CPT

## 2021-07-12 PROCEDURE — 93010 ELECTROCARDIOGRAM REPORT: CPT | Performed by: INTERNAL MEDICINE

## 2021-07-12 RX ORDER — NIFEDIPINE 30 MG/1
30 TABLET, EXTENDED RELEASE ORAL DAILY
Status: DISCONTINUED | OUTPATIENT
Start: 2021-07-12 | End: 2021-07-14 | Stop reason: HOSPADM

## 2021-07-12 RX ORDER — SODIUM CHLORIDE 9 MG/ML
INJECTION, SOLUTION INTRAVENOUS CONTINUOUS
Status: DISCONTINUED | OUTPATIENT
Start: 2021-07-12 | End: 2021-07-14

## 2021-07-12 RX ADMIN — CARVEDILOL 12.5 MG: 6.25 TABLET, FILM COATED ORAL at 17:33

## 2021-07-12 RX ADMIN — ATORVASTATIN CALCIUM 20 MG: 20 TABLET, FILM COATED ORAL at 20:41

## 2021-07-12 RX ADMIN — Medication 10 ML: at 08:18

## 2021-07-12 RX ADMIN — NIFEDIPINE 30 MG: 30 TABLET, FILM COATED, EXTENDED RELEASE ORAL at 10:36

## 2021-07-12 RX ADMIN — Medication 10 ML: at 20:41

## 2021-07-12 RX ADMIN — ASPIRIN 325 MG: 325 TABLET, COATED ORAL at 08:18

## 2021-07-12 RX ADMIN — SODIUM CHLORIDE: 9 INJECTION, SOLUTION INTRAVENOUS at 16:31

## 2021-07-12 ASSESSMENT — PAIN SCALES - GENERAL
PAINLEVEL_OUTOF10: 0

## 2021-07-12 NOTE — PROGRESS NOTES
HOSPITALISTS PROGRESS NOTE    7/12/2021 8:01 AM        Name: Lesly Young . Admitted: 7/10/2021  Primary Care Provider: Robe Joe MD (Tel: [phone])      CC: Hypertension    Brief Course: This  60 y.o. male with history of hyperlipidemia, essential hypertension, prior history of tobacco use (has not used tobacco in about 3 years), obesity with BMI of 34.98 kg/m², who has chronic shortness of breath (ongoing for about 11 months now), who has had 2 negative stress tests over the past 2 years (normal stress echocardiogram in August 2021) presented with  elevated BP. Interval history:   Patient seen and examined today  Overnight events and interval ancillary notes reviewed  Started on nicardipine drip yesterday evening and transferred to ICU for close BP monitoring. Creatinine remained elevated 1.4; losartan on hold      Assessment & Plan:    Hypertensive emergency: BP improving; off nicardipine drip  On Cardura and losartan at home;Reports compliance with BP medication  losartan on hold due to TAYLOR  Nephrology and cardiology on board; appreciate their recs  Started on nifedipine and Coreg  Monitor blood pressure closely  Serum renin and aldosterone in process  Renal ultrasound ordered    TAYLOR:  Nephrology on board; appreciate recs  Hold losartan  Avoid nephrotoxin  Strict intake output and daily weights  Monitor renal function closely    Chest pain: Resolved  Cardiology on board; appreciate their recs  Underwent 2 stress tests with borderline or abnormal ECG components and normal imaging.   No clear exertional pattern of chest pain but is of concern; CTA once renal function is stable      Chronic shortness of breath: Chronic issue   Will order outpatient sleep study to evaluate for RAMBO as patient's wife reports patient snores loudly at night    Hyperlipidemia: Not on any medications  Liver profile obtained on 7/10/2021  ASCVD score of 17.7%  Started on low-dose statins    obesity with BMI of 34.8 kg/m². DVT PPX: Lovenox  Code:Full Code  Diet: ADULT DIET; Regular; No Added Salt (3-4 gm)    Disposition: Follow-up when medically stable     Current Medications  [Held by provider] hydrALAZINE (APRESOLINE) injection 10 mg, Q6H PRN  [Held by provider] hydrALAZINE (APRESOLINE) tablet 50 mg, 3 times per day  niCARdipine (CARDENE) 25 mg in dextrose 5 % 250 mL infusion, Continuous  melatonin tablet 3 mg, Nightly PRN  atorvastatin (LIPITOR) tablet 20 mg, Nightly  albuterol sulfate  (90 Base) MCG/ACT inhaler 2 puff, Q6H PRN  sodium chloride flush 0.9 % injection 5-40 mL, 2 times per day  sodium chloride flush 0.9 % injection 10 mL, PRN  0.9 % sodium chloride infusion, PRN  enoxaparin (LOVENOX) injection 40 mg, Daily  ondansetron (ZOFRAN-ODT) disintegrating tablet 4 mg, Q8H PRN   Or  ondansetron (ZOFRAN) injection 4 mg, Q6H PRN  polyethylene glycol (GLYCOLAX) packet 17 g, Daily PRN  acetaminophen (TYLENOL) tablet 650 mg, Q6H PRN   Or  acetaminophen (TYLENOL) suppository 650 mg, Q6H PRN  [Held by provider] doxazosin (CARDURA) tablet 4 mg, Nightly  [Held by provider] carvedilol (COREG) tablet 12.5 mg, BID WC  aspirin EC tablet 325 mg, Daily        Objective:  /65   Pulse 56   Temp 98.5 °F (36.9 °C) (Temporal)   Resp 17   Ht 6' 2\" (1.88 m)   Wt 246 lb 14.6 oz (112 kg)   SpO2 99%   BMI 31.70 kg/m²     Intake/Output Summary (Last 24 hours) at 7/12/2021 0801  Last data filed at 7/12/2021 0425  Gross per 24 hour   Intake 30.8 ml   Output    Net 30.8 ml      Wt Readings from Last 3 Encounters:   07/11/21 246 lb 14.6 oz (112 kg)   12/15/20 258 lb (117 kg)   11/11/20 251 lb (113.9 kg)       Physical Examination:   General appearance: No apparent distress appears stated age and cooperative. HEENT Normal cephalic, atraumatic without obvious deformity. Pupils equal, round, and reactive to light.   Extra ocular muscles intact. Conjunctivae/corneas clear. Lungs: Clear to auscultation, bilaterally without Rales/Wheezes/Rhonchi with good respiratory effort. Heart: Regular rate and rhythm with Normal S1/S2 without murmurs, rubs or gallops, point of maximum impulse non-displaced  Abdomen: Soft, non-tender or non-distended without rigidity or guarding and positive bowel sounds all four quadrants. Extremities: No clubbing, cyanosis, or edema bilaterally. Full range of motion without deformity and normal gait intact. Neurologic: Alert and oriented X 3, neurovascularly intact with sensory/motor intact upper extremities/lower extremities, bilaterally. Cranial nerves: II-XII intact, grossly non-focal.  Psychiatry: Appropriate affect. Not agitated  Skin: Warm, dry, normal turgor, no rash  Brisk capillary refill, peripheral pulses palpable     Labs and Tests:  CBC:   Recent Labs     07/10/21  0049   WBC 7.7   HGB 14.8        BMP:    Recent Labs     07/10/21  0049 07/11/21  0456 07/12/21  0235    139 138   K 4.5 4.7 4.5    104 102   CO2 25 24 25   BUN 23* 21* 20   CREATININE 1.2 1.4* 1.4*   GLUCOSE 110* 109* 101*     Hepatic:   Recent Labs     07/10/21  0049   AST 25   ALT 32   BILITOT 0.3   ALKPHOS 103     XR CHEST PORTABLE   Final Result   Marginal inspiration, without evidence of acute cardiopulmonary disease             Problem List  Active Problems:    Uncontrolled hypertension    Hypertensive emergency  Resolved Problems:    * No resolved hospital problems.  Jarad Ortiz MD   7/12/2021 8:01 AM

## 2021-07-12 NOTE — CONSULTS
[facility]  [phone]        Reason for Consultation/Chief Complaint: \" Chest pain, hypertension. \"    History of Present Illness:  Marion Tabares is a 60 y.o. patient who presented to the hospital with complaints of severe high blood pressure. He states that he developed severe high blood pressure and at that time it noted left sided chest pain, shooting, tends to occur with increased BP. He tends to feel his blood pressure goe up. Left arm becomes tingling and radiates to his jaw, diaphoresis. Past Medical History:   has a past medical history of Hyperlipidemia, Hypertension, Meningitis, and Smoker. Surgical History:   has a past surgical history that includes hernia repair. Social History:   reports that he quit smoking about 3 years ago. His smoking use included cigarettes. He has a 15.00 pack-year smoking history. He has never used smokeless tobacco. He reports current alcohol use of about 6.0 standard drinks of alcohol per week. He reports that he does not use drugs. Family History:  family history includes Heart Disease in his father; Hypertension in his mother. Home Medications:  Were reviewed and are listed in nursing record. and/or listed below  Prior to Admission medications    Medication Sig Start Date End Date Taking?  Authorizing Provider   atorvastatin (LIPITOR) 20 MG tablet Take 1 tablet by mouth daily 3/27/21  Yes Nadia Kelley MD   doxazosin (CARDURA) 4 MG tablet TAKE 1 TABLET BY MOUTH EVERY DAY 12/10/20  Yes Nadia Kelley MD   losartan (COZAAR) 100 MG tablet TAKE 1 TABLET BY MOUTH EVERY DAY  Patient taking differently: Take 50 mg by mouth daily TAKE 1 TABLET BY MOUTH EVERY DAY 10/1/20  Yes Nadia Kelley MD   Blood Pressure KIT Check BP twice a week/HTN 10/14/19  Yes Brittney Tobar MD        Allergies:  Hctz [hydrochlorothiazide] and Lisinopril     Review of Systems:   A complete review of systems has been reviewed and updated today and is negative except as noted in the history of present illness.       Physical Examination:    Vitals:    07/12/21 1330   BP: 127/77   Pulse: 56   Resp:    Temp:    SpO2:     Weight: 246 lb 14.6 oz (112 kg)         General Appearance:  Alert, cooperative, no distress, appears stated age   Head:  Normocephalic, without obvious abnormality, atraumatic   Eyes:  EOMI, conjunctiva/corneas clear       Nose: Nares normal   Throat: Lips normal   Neck: Supple, symmetrical, trachea midline,  no carotid bruit or JVD       Lungs:   Clear to auscultation bilaterally, respirations unlabored   Chest Wall:  No tenderness or deformity   Heart:  Regular rate and rhythm, S1, S2 normal, no murmur, rub or gallop   Abdomen:   Soft, non-tender, bowel sounds active all four quadrants,  no masses, no organomegaly           Extremities: Extremities normal, atraumatic, no cyanosis or edema   Pulses: 2+ and symmetric   Skin: Skin color, texture, turgor normal, no rashes or lesions   Pysch: Normal mood and affect   Neurologic: Normal gross motor and sensory exam.         Labs  CBC:   Lab Results   Component Value Date    WBC 7.7 07/10/2021    RBC 4.72 07/10/2021    HGB 14.8 07/10/2021    HCT 43.0 07/10/2021    MCV 91.3 07/10/2021    RDW 13.7 07/10/2021     07/10/2021     CMP:    Lab Results   Component Value Date     07/12/2021    K 4.5 07/12/2021     07/12/2021    CO2 25 07/12/2021    BUN 20 07/12/2021    CREATININE 1.4 07/12/2021    GFRAA >60 07/12/2021    GFRAA >60 06/17/2012    AGRATIO 1.7 07/10/2021    LABGLOM 52 07/12/2021    GLUCOSE 101 07/12/2021    PROT 6.9 07/10/2021    CALCIUM 9.6 07/12/2021    BILITOT 0.3 07/10/2021    ALKPHOS 103 07/10/2021    AST 25 07/10/2021    ALT 32 07/10/2021     LIPIDS: No components found for: TOTAL CHOLESTEROL,  HDL,  LDL,  TRIGLYCERIDES  PT/INR:  No results found for: PTINR  Lab Results   Component Value Date    CKTOTAL 131 03/11/2015    CKMB 1.2 03/11/2015    TROPONINI <0.01 07/11/2021       EKG:  I have reviewed EKG with the following interpretation:  Impression:    11-JUL-2021 18:26:00 [address] ROUTINE RECORD  Sinus bradycardia  Low voltage QRS  Borderline ECG  When compared with ECG of 10-JUL-2021 22:30,  No significant change was found    Imaging/Procedures:   Stress echo 8/24/2020:  Echo   Baseline resting echocardiogram shows normal global LV systolic function with an ejection fraction of 55% and uniform myocardial segmental wall   motion. Following stress there was uniform augmentation of all myocardial segments with appropriate hyperdynamic LV systolic response to stress. ECG   Approximately 1 mm of ST horizontal depression that is equivocal for   ischemia. Symptoms   No symptoms with exercise. Assessment/Plan:  Principal Problem:    Hypertensive emergency  Plan: Labile. Improving. Agree with addition of Procardia XL, continuation of Coreg and holding of Cardura. Nephrology consulted as well. Agree with renal artery duplex. Active Problems:    Chest pain  Plan: Has had 2 stress tests with borderline or abnormal ECG components and normal imaging. No clear exertional pattern of chest pain but is of concern. Would like coronary CTA once renal function is optimized. Thank you for allowing us to participate in the care of Bree Jackson. Further evaluation will be based upon the patient's clinical course and testing results. All questions and concerns were addressed to the patient/family. Alternatives to my treatment were discussed. The note was completed using EMR. Every effort was made to ensure accuracy; however, inadvertent computerized transcription errors may be present.     Michael Snow M.D.

## 2021-07-12 NOTE — PLAN OF CARE
Problem: Pain:  Goal: Control of acute pain  Description: Control of acute pain  7/11/2021 2002 by Huntley Bloch, RN  Note: Pt alert and oriented. Pt able to communicate present pain and use the pain scale appropriately. Nonpharmacological pain reducers and pain medication offered as needed. Will cont to monitor. Problem: Cardiac:  Goal: Hemodynamic stability will improve  Description: Hemodynamic stability will improve  Note: Cardene gtt infusing, BP stabilizing Pt A&O. PO meds held at this time.

## 2021-07-12 NOTE — CARE COORDINATION
Discharge Planning Assessment  Readmission risk score 9%  RN discharge planner met with patient/ (and family member) to discuss reason for admission, current living situation, and potential needs at the time of discharge    Demographics/Insurance verified Yes    Current type of dwelling: single level home 1 step to enter    Patient from ECF/SW confirmed with:n/a    Living arrangements:with wife    Level of function/Support:independent    PCP: FARTUN Finley    Last Visit to PCP: December 15, 2020    DME:none    Active with any community resources/agencies/skilled home care: none    Medication compliance issues: no concerns    Financial issues that could impact healthcare: not identified    Tentative discharge plan: home    Discussed and provided facilities of choice if transition to a skilled nursing facility is required at the time of discharge      Discussed with patient and/or family that on the day of discharge home tentative time of discharge will be between 10 AM and noon.     Transportation at the time of discharge: wife    Selinda Cockayne, BSN, CCM, RN  [facility]  [phone]

## 2021-07-12 NOTE — PROGRESS NOTES
Pt admitted to CVU bed 2. A&O, wife at bedside. Pt able to ambulate to bed without difficulty. Cardene gtt to be used for BP control. /97 on admit.

## 2021-07-12 NOTE — PROGRESS NOTES
Pt alerted nurse that he felt his BP rising. RN to room. 0229: 132/100 - LUE  0230: 161/97- LUE  Pt had \"tingly hands, and got chilled and shaky\"  Blood drawn and sent to lab.

## 2021-07-12 NOTE — PROGRESS NOTES
Physician Progress Note      Abdulkadir Arango  CSN #:                  [id number]  :                       1965  ADMIT DATE:       7/10/2021 12:02 AM  [address] [place] DATE:  RESPONDING  PROVIDER #:        Tamara Castellano MD          QUERY TEXT:    Dear Dr. My Carson,    Pt admitted with elevated blood pressure, numbness, dyspnea. Pt noted to have   uncontrolled hypertension. If possible, please document in progress notes and   discharge summary if you are evaluating and/or treating any of the following: The medical record reflects the following:  Risk Factors: HTN Obesity  Clinical Indicators: Patient presented with elevated BP at home of 190,   reports compliance with home medications, also associated numbness and   dyspnea. BP in /92 to 206/96. Requiring IV Hydralazine for elevated   blood pressures. Treatment: IV hydralazine, Increase dosage of Cardura and Losartan. Nephrology   consult    Thank you    Kasandra Briggs RN CDS      Hypertensive Crisis, unspecified: at least 2 consecutive readings of SBP > 180   mmHg or DBP > 110 mmHg  Hypertensive Urgency: Hypertensive crisis w/o associated organ dysfunction. S/s may or may not be present, but can include severe headache, SOB,   epistaxis, severe anxiety. Tx: adjustment of oral antihypertensives; IV meds   not usually required. Hypertensive Emergency: Hypertensive crisis w/ associated organ damage   (stroke, encephalopathy, TAYLOR, MI, angina, acute or decompensated CHF, acute   pulmonary edema, HELLP, etc.). Requires immediate treatment (usually IV meds)   & possible ICU admission. Associated organ dysfunction needs documented.   [email or web address]  Options provided:  -- Hypertensive Crisis  -- Hypertensive Emergency  -- Hypertensive Urgency  -- Other - I will add my own diagnosis  -- Disagree - Not applicable / Not valid  -- Disagree - Clinically unable to determine / Unknown  -- Refer to Clinical Documentation Reviewer    PROVIDER RESPONSE TEXT:    This patient has hypertensive emergency.     Query created by: Arsenio Anthony on 7/12/2021 9:59 AM      Electronically signed by:  Bianka Keita MD 7/12/2021 4:18 PM

## 2021-07-12 NOTE — PROGRESS NOTES
Procedure Laterality Date    HERNIA REPAIR      inguinal hernia , right, age 9       Family History   Problem Relation Age of Onset    Heart Disease Father     Hypertension Mother         reports that he quit smoking about 3 years ago. His smoking use included cigarettes. He has a 15.00 pack-year smoking history. He has never used smokeless tobacco. He reports current alcohol use of about 6.0 standard drinks of alcohol per week. He reports that he does not use drugs.         Allergies:  Hctz [hydrochlorothiazide] and Lisinopril    Current Medications:    NIFEdipine (PROCARDIA XL) extended release tablet 30 mg, Daily  [Held by provider] hydrALAZINE (APRESOLINE) injection 10 mg, Q6H PRN  niCARdipine (CARDENE) 25 mg in dextrose 5 % 250 mL infusion, Continuous  melatonin tablet 3 mg, Nightly PRN  atorvastatin (LIPITOR) tablet 20 mg, Nightly  albuterol sulfate  (90 Base) MCG/ACT inhaler 2 puff, Q6H PRN  sodium chloride flush 0.9 % injection 5-40 mL, 2 times per day  sodium chloride flush 0.9 % injection 10 mL, PRN  0.9 % sodium chloride infusion, PRN  enoxaparin (LOVENOX) injection 40 mg, Daily  ondansetron (ZOFRAN-ODT) disintegrating tablet 4 mg, Q8H PRN   Or  ondansetron (ZOFRAN) injection 4 mg, Q6H PRN  polyethylene glycol (GLYCOLAX) packet 17 g, Daily PRN  acetaminophen (TYLENOL) tablet 650 mg, Q6H PRN   Or  acetaminophen (TYLENOL) suppository 650 mg, Q6H PRN  [Held by provider] doxazosin (CARDURA) tablet 4 mg, Nightly  carvedilol (COREG) tablet 12.5 mg, BID WC  aspirin EC tablet 325 mg, Daily            Physical exam:     Vitals:  /80   Pulse 62   Temp 98.5 °F (36.9 °C) (Temporal)   Resp 17   Ht 6' 2\" (1.88 m)   Wt 246 lb 14.6 oz (112 kg)   SpO2 99%   BMI 31.70 kg/m²   Constitutional:  OAA X3 NAD  Skin: no rash, turgor wnl  Heent:  eomi, mmm  Neck: no bruits or jvd noted  Cardiovascular:  S1, S2 without m/r/g  Respiratory: CTA B without w/r/r  Abdomen:  +bs, soft, nt, nd  Ext: no lower extremity edema  Psychiatric: mood and affect appropriate  Musculoskeletal:  Rom, muscular strength intact    Labs:  CBC:   Recent Labs     07/10/21  0049   WBC 7.7   HGB 14.8        BMP:    Recent Labs     07/10/21  0049 07/11/21  0456 07/12/21  0235    139 138   K 4.5 4.7 4.5    104 102   CO2 25 24 25   BUN 23* 21* 20   CREATININE 1.2 1.4* 1.4*   GLUCOSE 110* 109* 101*     Ca/Mg/Phos:   Recent Labs     07/10/21  0049 07/11/21  0456 07/12/21  0235   CALCIUM 9.5 9.7 9.6     Hepatic:   Recent Labs     07/10/21  0049   AST 25   ALT 32   BILITOT 0.3   ALKPHOS 103     Troponin:   Recent Labs     07/10/21  2333 07/11/21 0456 07/11/21  1853   TROPONINI <0.01 <0.01 <0.01     BNP: No results for input(s): BNP in the last 72 hours. Lipids: No results for input(s): CHOL, TRIG, HDL, LDLCALC, LABVLDL in the last 72 hours. ABGs: No results for input(s): PHART, PO2ART, [id number] in the last 72 hours. INR: No results for input(s): INR in the last 72 hours. UA:No results for input(s): Isidro Belkis, GLUCOSEU, BILIRUBINUR, Zay Gain, BLOODU, PHUR, PROTEINU, UROBILINOGEN, NITRU, LEUKOCYTESUR, LABMICR, URINETYPE in the last 72 hours. Urine Microscopic: No results for input(s): LABCAST, BACTERIA, COMU, HYALCAST, WBCUA, RBCUA, EPIU in the last 72 hours. Urine Culture: No results for input(s): LABURIN in the last 72 hours. Urine Chemistry: No results for input(s): Mal Crum, PROTEINUR, NAUR in the last 72 hours.       IMAGING:  XR CHEST PORTABLE   Final Result   Marginal inspiration, without evidence of acute cardiopulmonary disease         VL Renal Arterial Duplex Complete    (Results Pending)         Assessment/Plan :      1. Labile  HTN   coreg 12.5 mg po bid   Since off cardene drip will start PO BP meds  He does not want hydralazine 3 times a day because of his work schedule   Will start nifedipine XL 30 mg po once a day for now  If needed can increase to twice a day based on trend of BP This creatinine level increased to 1.4 so held losartan    2. Likely has sleep apnea   Will get out pt sleep study     3.  Obesity   BMI 34   Weight loss with diet and lifestyle changes       Renin/ aldosterone level   Check renal Doppler study        Thank you for allowing us to participate in care of Bree Jackson         Electronically signed by: Salome Soto MD, 7/12/2021, 10:39 AM      [facility] [address]  Office : [phone]  Fax :[phone]

## 2021-07-13 ENCOUNTER — APPOINTMENT (OUTPATIENT)
Dept: CT IMAGING | Age: 56
DRG: 305 | End: 2021-07-13
Payer: COMMERCIAL

## 2021-07-13 PROBLEM — N28.9 RENAL INSUFFICIENCY: Status: ACTIVE | Noted: 2021-07-13

## 2021-07-13 LAB
ANION GAP SERPL CALCULATED.3IONS-SCNC: 11 MMOL/L (ref 3–16)
BUN BLDV-MCNC: 24 MG/DL (ref 7–20)
CALCIUM SERPL-MCNC: 9.4 MG/DL (ref 8.3–10.6)
CHLORIDE BLD-SCNC: 101 MMOL/L (ref 99–110)
CO2: 25 MMOL/L (ref 21–32)
CREAT SERPL-MCNC: 1.3 MG/DL (ref 0.9–1.3)
GFR AFRICAN AMERICAN: >60
GFR NON-AFRICAN AMERICAN: 57
GLUCOSE BLD-MCNC: 102 MG/DL (ref 70–99)
POTASSIUM SERPL-SCNC: 4.2 MMOL/L (ref 3.5–5.1)
SODIUM BLD-SCNC: 137 MMOL/L (ref 136–145)

## 2021-07-13 PROCEDURE — 6370000000 HC RX 637 (ALT 250 FOR IP): Performed by: INTERNAL MEDICINE

## 2021-07-13 PROCEDURE — 2580000003 HC RX 258: Performed by: INTERNAL MEDICINE

## 2021-07-13 PROCEDURE — 80048 BASIC METABOLIC PNL TOTAL CA: CPT

## 2021-07-13 PROCEDURE — 6360000004 HC RX CONTRAST MEDICATION: Performed by: INTERNAL MEDICINE

## 2021-07-13 PROCEDURE — 93975 VASCULAR STUDY: CPT

## 2021-07-13 PROCEDURE — 99233 SBSQ HOSP IP/OBS HIGH 50: CPT | Performed by: INTERNAL MEDICINE

## 2021-07-13 PROCEDURE — 6370000000 HC RX 637 (ALT 250 FOR IP): Performed by: NURSE PRACTITIONER

## 2021-07-13 PROCEDURE — 75574 CT ANGIO HRT W/3D IMAGE: CPT

## 2021-07-13 PROCEDURE — 2000000000 HC ICU R&B

## 2021-07-13 RX ORDER — ASPIRIN 81 MG/1
81 TABLET ORAL DAILY
Status: DISCONTINUED | OUTPATIENT
Start: 2021-07-14 | End: 2021-07-14 | Stop reason: HOSPADM

## 2021-07-13 RX ORDER — NITROGLYCERIN 0.4 MG/1
TABLET SUBLINGUAL
Status: DISPENSED
Start: 2021-07-13 | End: 2021-07-13

## 2021-07-13 RX ADMIN — ATORVASTATIN CALCIUM 20 MG: 20 TABLET, FILM COATED ORAL at 20:22

## 2021-07-13 RX ADMIN — SODIUM CHLORIDE: 9 INJECTION, SOLUTION INTRAVENOUS at 15:46

## 2021-07-13 RX ADMIN — CARVEDILOL 12.5 MG: 6.25 TABLET, FILM COATED ORAL at 15:51

## 2021-07-13 RX ADMIN — ACETAMINOPHEN 650 MG: 325 TABLET ORAL at 07:45

## 2021-07-13 RX ADMIN — NIFEDIPINE 30 MG: 30 TABLET, FILM COATED, EXTENDED RELEASE ORAL at 07:34

## 2021-07-13 RX ADMIN — ASPIRIN 325 MG: 325 TABLET, COATED ORAL at 07:33

## 2021-07-13 RX ADMIN — IOPAMIDOL 85 ML: 755 INJECTION, SOLUTION INTRAVENOUS at 09:38

## 2021-07-13 RX ADMIN — Medication 10 ML: at 20:22

## 2021-07-13 RX ADMIN — CARVEDILOL 12.5 MG: 6.25 TABLET, FILM COATED ORAL at 07:34

## 2021-07-13 RX ADMIN — SODIUM CHLORIDE: 9 INJECTION, SOLUTION INTRAVENOUS at 17:13

## 2021-07-13 ASSESSMENT — PAIN SCALES - GENERAL
PAINLEVEL_OUTOF10: 0
PAINLEVEL_OUTOF10: 2
PAINLEVEL_OUTOF10: 0

## 2021-07-13 ASSESSMENT — PAIN DESCRIPTION - FREQUENCY: FREQUENCY: INTERMITTENT

## 2021-07-13 ASSESSMENT — PAIN DESCRIPTION - ONSET: ONSET: PROGRESSIVE

## 2021-07-13 ASSESSMENT — PAIN DESCRIPTION - PAIN TYPE: TYPE: ACUTE PAIN

## 2021-07-13 ASSESSMENT — PAIN DESCRIPTION - ORIENTATION: ORIENTATION: ANTERIOR

## 2021-07-13 ASSESSMENT — PAIN DESCRIPTION - LOCATION: LOCATION: HEAD

## 2021-07-13 ASSESSMENT — PAIN DESCRIPTION - DESCRIPTORS: DESCRIPTORS: ACHING

## 2021-07-13 NOTE — PROGRESS NOTES
[facility] PROGRESS NOTE    7/13/2021 8:24 AM        Name: Bob Cruz . Admitted: 7/10/2021  Primary Care Provider: Deanna Fierro MD (Tel: [phone])      Chief Complaint   Patient presents with    Hypertension     pt states bp was elevated when he checked it tonight. does take home bp medication. denies headaches but states \"just not feeling right\". Brief History: Patient is a 59 yo male with hx HTN, HLD, obesity. He has normal stress echo (8/2020). Patient presented to hospital with c/o elevated BP readings associated with numbness left forearm and dizziness. Reports compliance with medications. BP in /92 on presentation. Subjective:  Resting in bed. Says he feels \"a bit out of it today. Like BP may be too low. \" BP checked and presently 146/86. Denies chest pain, shortness of breath, lightheadedness, dizziness, abdominal pain, nausea, constipation. NPO for testing today, (renal duplex, CTA cors).      Reviewed interval ancillary notes    Current Medications  NIFEdipine (PROCARDIA XL) extended release tablet 30 mg, Daily  0.9 % sodium chloride infusion, Continuous  [Held by provider] hydrALAZINE (APRESOLINE) injection 10 mg, Q6H PRN  niCARdipine (CARDENE) 25 mg in dextrose 5 % 250 mL infusion, Continuous  melatonin tablet 3 mg, Nightly PRN  atorvastatin (LIPITOR) tablet 20 mg, Nightly  albuterol sulfate  (90 Base) MCG/ACT inhaler 2 puff, Q6H PRN  sodium chloride flush 0.9 % injection 5-40 mL, 2 times per day  sodium chloride flush 0.9 % injection 10 mL, PRN  0.9 % sodium chloride infusion, PRN  enoxaparin (LOVENOX) injection 40 mg, Daily  ondansetron (ZOFRAN-ODT) disintegrating tablet 4 mg, Q8H PRN   Or  ondansetron (ZOFRAN) injection 4 mg, Q6H PRN  polyethylene glycol (GLYCOLAX) packet 17 g, Daily PRN  acetaminophen (TYLENOL) tablet 650 mg, Q6H PRN   Or  acetaminophen (TYLENOL) suppository 650 mg, Q6H PRN  [Held by provider] doxazosin (CARDURA) tablet 4 mg, Nightly  carvedilol (COREG) tablet 12.5 mg, BID WC  aspirin EC tablet 325 mg, Daily        Objective:  BP (!) 141/87   Pulse 69   Temp 97.5 °F (36.4 °C) (Temporal)   Resp 18   Ht 6' 2\" (1.88 m)   Wt 247 lb 12.8 oz (112.4 kg)   SpO2 95%   BMI 31.82 kg/m²     Intake/Output Summary (Last 24 hours) at 7/13/2021 0824  Last data filed at 7/13/2021 0200  Gross per 24 hour   Intake 510.01 ml   Output    Net 510.01 ml      Wt Readings from Last 3 Encounters:   07/13/21 247 lb 12.8 oz (112.4 kg)   12/15/20 258 lb (117 kg)   11/11/20 251 lb (113.9 kg)       General appearance:  Appears comfortable  Eyes: Sclera clear. Pupils equal.  ENT: Moist oral mucosa. Trachea midline, no adenopathy. Cardiovascular: Regular rhythm, normal S1, S2. No murmur. No edema in lower extremities  Respiratory: Not using accessory muscles. Good inspiratory effort. Clear to auscultation bilaterally, no wheeze or crackles. GI: Abdomen soft, no tenderness, not distended, normal bowel sounds  Musculoskeletal: No cyanosis in digits, neck supple  Neurology: CN 2-12 grossly intact. No speech or motor deficits  Psych: Normal affect. Alert and oriented in time, place and person  Skin: Warm, dry, normal turgor    Labs and Tests:  CBC:   No results for input(s): WBC, HGB, PLT in the last 72 hours. BMP:    Recent Labs     07/10/21  0049 07/11/21  0456 07/12/21  0235    139 138   K 4.5 4.7 4.5    104 102   CO2 25 24 25   BUN 23* 21* 20   CREATININE 1.2 1.4* 1.4*   GLUCOSE 110* 109* 101*     Hepatic:   Recent Labs     07/10/21  0049   AST 25   ALT 32   BILITOT 0.3   ALKPHOS 103       CXR 7/10/2021:  Marginal inspiration, without evidence of acute cardiopulmonary disease    Problem List  Principal Problem:    Hypertensive emergency  Active Problems:    Chest pain    Uncontrolled hypertension  Resolved Problems:    * No resolved hospital problems.  * Assessment & Plan:   1. HTN emergency. BP on presentation up to 133 systolic. Accompanied with chest pain, dizziness and numbness in forearm with possible TAYLOR. Initially received IV hydralazine and placed on nicardipine drip, weaned 7/12. BP improved and controlled on current meds. Appreciate nephrology recs. Renal arterial duplex pending. 2. Chest pain/chronic shortness of breath. Troponin negative, no acute findings on EKG. No evidence pulmonary edema on CXR, proBNP 19. Has had 2 stress tests with borderline or abnormal ECG components and normal imaging. Cardiology on board, considering coronary CTA. 3. Renal insufficiency. Creatinine 1.2->1.4. Losartan on hold. Continue to follow. Nephrology on board.         Diet: Diet NPO Exceptions are: Sips of Water with Meds, Ice Chips  Code:Full Code  DVT PPX: enoxaparin (refused), place scds      DEETPI Trujillo CNP   7/13/2021 8:24 AM

## 2021-07-13 NOTE — PROGRESS NOTES
[facility] Daily Progress Note      Admit Date:  7/10/2021    Chief Complaint: Chest pain, elevated blood pressure    Subjective:  Mr. Yeni Gonzales has no significant complaints. Shortness of breath resolved. Chest pain much improved. He states sometimes he can have some tenderness to palpation on some episodes of chest pain. Objective:   BP (!) 145/88   Pulse 65   Temp 97.6 °F (36.4 °C) (Temporal)   Resp 19   Ht 6' 2\" (1.88 m)   Wt 247 lb 12.8 oz (112.4 kg)   SpO2 100%   BMI 31.82 kg/m²       Intake/Output Summary (Last 24 hours) at 7/13/2021 1615  Last data filed at 7/13/2021 0200  Gross per 24 hour   Intake 510.01 ml   Output    Net 510.01 ml       TELEMETRY: Sinus     Physical Exam:  General:  Awake, alert, oriented x 3, NAD  Skin:  Warm and dry  Neck:  JVD normal  Chest:  normal air entry  Cardiovascular:  RRR S1S2, no S3, no significant murmur  Abdomen:  Soft, ND, NT, No HSM  Extremities:  No edema    Medications:    nitroGLYCERIN        NIFEdipine  30 mg Oral Daily    atorvastatin  20 mg Oral Nightly    sodium chloride flush  5-40 mL Intravenous 2 times per day    enoxaparin  40 mg Subcutaneous Daily    [Held by provider] doxazosin  4 mg Oral Nightly    carvedilol  12.5 mg Oral BID WC    aspirin  325 mg Oral Daily      sodium chloride 50 mL/hr at 07/13/21 1546    niCARdipine Stopped (07/12/21 0705)    sodium chloride       [Held by provider] hydrALAZINE, melatonin, albuterol sulfate HFA, sodium chloride flush, sodium chloride, ondansetron **OR** ondansetron, polyethylene glycol, acetaminophen **OR** acetaminophen    Lab Data:  CBC: No results for input(s): WBC, HGB, HCT, MCV, PLT in the last 72 hours.   BMP:   Recent Labs     07/11/21  0456 07/12/21  0235 07/13/21  0205    138 137   K 4.7 4.5 4.2    102 101   CO2 24 25 25   BUN 21* 20 24*   CREATININE 1.4* 1.4* 1.3     LIVER PROFILE: No results for input(s): AST, ALT, LIPASE, BILIDIR, BILITOT, ALKPHOS in the last 72 hours.    Invalid input(s): AMYLASE,  ALB  PT/INR: No results for input(s): PROTIME, INR in the last 72 hours. APTT: No results for input(s): APTT in the last 72 hours. BNP:  No results for input(s): BNP in the last 72 hours. Imaging/Procedures:  Renal artery duplex 7/13/2021:  Summary        -Bilateral renal artery ultrasound imaging was performed. No evidence of    renal artery stenosis. Renal veins are patent.    -Right lower pole cyst measuring 1.2 x 1.1 x 1.2 cm. Coronary CTA 7/30/2021:  No significant coronary artery stenosis is identified.       Right coronary dominance. Stress echo 8/24/2020:  Echo   Baseline resting echocardiogram shows normal global LV systolic function with an ejection fraction of 55% and uniform myocardial segmental wall   motion. Following stress there was uniform augmentation of all myocardial segments with appropriate hyperdynamic LV systolic response to stress.      ECG   Approximately 1 mm of ST horizontal depression that is equivocal for   ischemia.      Symptoms   No symptoms with exercise. Assessment/Plan:  Principal Problem:    Hypertensive emergency  Plan: Much improved. Active Problems:    Chest pain  Plan: Atypical.  Coronary CTA without significant CAD. Coronary artery calcification seen on CT  Plan: Consistent with some degree of coronary artery disease. Coronary CTA without significant CAD. Risk factor modification. Enteric-coated aspirin 81 mg daily, continue statin, continue Coreg. Renal insufficiency  Plan: Improved. We will sign off, please call if we can be of further assistance.           Darrin Mccallum MD, MD 7/13/2021 4:15 PM

## 2021-07-13 NOTE — PROGRESS NOTES
[facility] PROGRESS NOTE    7/13/2021 8:49 AM        Name: Isaac Orourke . Admitted: 7/10/2021  Primary Care Provider: Merline Less, MD (Tel: [phone])      Chief Complaint   Patient presents with    Hypertension     pt states bp was elevated when he checked it tonight. does take home bp medication. denies headaches but states \"just not feeling right\". Brief History: Patient is a 59 yo male with hx HTN, HLD, obesity. He has normal stress echo (8/2020). Patient presented to hospital with c/o elevated BP readings associated with numbness left forearm and dizziness. Reports compliance with medications. BP in /92 on presentation. Subjective:   Resting in bed. Says he feels \"a bit out of it today. Like BP may be too low. \" BP checked and presently 146/86. Denies chest pain, shortness of breath, lightheadedness, dizziness, abdominal pain, nausea, constipation. NPO for testing today, (renal duplex, CTA cors).      Reviewed interval ancillary notes    Current Medications  NIFEdipine (PROCARDIA XL) extended release tablet 30 mg, Daily  0.9 % sodium chloride infusion, Continuous  [Held by provider] hydrALAZINE (APRESOLINE) injection 10 mg, Q6H PRN  niCARdipine (CARDENE) 25 mg in dextrose 5 % 250 mL infusion, Continuous  melatonin tablet 3 mg, Nightly PRN  atorvastatin (LIPITOR) tablet 20 mg, Nightly  albuterol sulfate  (90 Base) MCG/ACT inhaler 2 puff, Q6H PRN  sodium chloride flush 0.9 % injection 5-40 mL, 2 times per day  sodium chloride flush 0.9 % injection 10 mL, PRN  0.9 % sodium chloride infusion, PRN  enoxaparin (LOVENOX) injection 40 mg, Daily  ondansetron (ZOFRAN-ODT) disintegrating tablet 4 mg, Q8H PRN   Or  ondansetron (ZOFRAN) injection 4 mg, Q6H PRN  polyethylene glycol (GLYCOLAX) packet 17 g, Daily PRN  acetaminophen (TYLENOL) tablet 650 mg, Q6H PRN   Or  acetaminophen (TYLENOL) suppository 650 mg, Q6H PRN  [Held by provider] doxazosin (CARDURA) tablet 4 mg, Nightly  carvedilol (COREG) tablet 12.5 mg, BID WC  aspirin EC tablet 325 mg, Daily        Objective:  BP (!) 141/87   Pulse 69   Temp 97.5 °F (36.4 °C) (Temporal)   Resp 18   Ht 6' 2\" (1.88 m)   Wt 247 lb 12.8 oz (112.4 kg)   SpO2 95%   BMI 31.82 kg/m²     Intake/Output Summary (Last 24 hours) at 7/13/2021 0849  Last data filed at 7/13/2021 0200  Gross per 24 hour   Intake 510.01 ml   Output    Net 510.01 ml      Wt Readings from Last 3 Encounters:   07/13/21 247 lb 12.8 oz (112.4 kg)   12/15/20 258 lb (117 kg)   11/11/20 251 lb (113.9 kg)       General appearance:  Appears comfortable  Eyes: Sclera clear. Pupils equal.  ENT: Moist oral mucosa. Trachea midline, no adenopathy. Cardiovascular: Regular rhythm, normal S1, S2. No murmur. No edema in lower extremities  Respiratory: Not using accessory muscles. Good inspiratory effort. Clear to auscultation bilaterally, no wheeze or crackles. GI: Abdomen soft, no tenderness, not distended, normal bowel sounds  Musculoskeletal: No cyanosis in digits, neck supple  Neurology: CN 2-12 grossly intact. No speech or motor deficits  Psych: Normal affect. Alert and oriented in time, place and person  Skin: Warm, dry, normal turgor    Labs and Tests:  CBC: No results for input(s): WBC, HGB, PLT in the last 72 hours. BMP:    Recent Labs     07/11/21  0456 07/12/21  0235 07/13/21  0205    138 137   K 4.7 4.5 4.2    102 101   CO2 24 25 25   BUN 21* 20 24*   CREATININE 1.4* 1.4* 1.3   GLUCOSE 109* 101* 102*     Hepatic: No results for input(s): AST, ALT, ALB, BILITOT, ALKPHOS in the last 72 hours. CXR 7/10/2021:  Marginal inspiration, without evidence of acute cardiopulmonary disease    Problem List  Principal Problem:    Hypertensive emergency  Active Problems:    Chest pain    Uncontrolled hypertension  Resolved Problems:    * No resolved hospital problems.  *       Assessment & Plan: 1. HTN emergency. BP on presentation up to 118 systolic. Accompanied with chest pain, dizziness and numbness in forearm with possible TAYLOR. Initially received IV hydralazine and placed on nicardipine drip, weaned 7/12. BP improved and controlled on current meds. Appreciate nephrology recs. Renal arterial duplex pending. 2. Chest pain/chronic shortness of breath. Troponin negative, no acute findings on EKG. No evidence pulmonary edema on CXR, proBNP 19. Has had 2 stress tests with borderline or abnormal ECG components and normal imaging. Cardiology on board, considering coronary CTA. 3. Renal insufficiency. Creatinine 1.2->1.4. Losartan on hold. Continue to follow. Nephrology on board.        Diet: Diet NPO Exceptions are: Sips of Water with Meds, Ice Chips  Code:Full Code  DVT PPX: enoxaparin (refused), place scds      DEEPTI Jewell CNP   7/13/2021 8:49 AM

## 2021-07-13 NOTE — PROGRESS NOTES
Pt reports headache 2/10, PRN tylenol administered. SB on monitor, pt to get Renal duplex and CTA today. Educated on medications, v/u, declined lovenox.

## 2021-07-13 NOTE — PROGRESS NOTES
Office : [phone]     Fax :[phone]       Nephrology progress  Note      Patient's Name: Eva Fuentes  11:18 AM  7/13/2021    Reason for Consult:  Labile HTN      Chief Complaint:    Chief Complaint   Patient presents with    Hypertension     pt states bp was elevated when he checked it tonight. does take home bp medication. denies headaches but states \"just not feeling right\". History of Present Ilness:    Eav Fuentes is a 60 y.o. male with pmh of hyperlipidemia, essential hypertension, occasionally smokes cigar, obesity with BMI of 34.98 kg/m², who has chronic shortness of breath (ongoing for about 11 months now), who has had 2 negative stress tests over the past 2 years (normal stress echocardiogram in August 2021), who presents to the emergency room with complaints of elevated blood pressure reading. He also reports that he has some numbness in his left forearm, dizziness, both of which he typically gets whenever his blood pressure is elevated (in fact, that is how patient knows that his blood pressure is elevated). Wife with him at bedside   Has been consuming foods with high sodium content   Drink alcohol daily. Mainly wine. 2 drinks on average     C/o dizziness right now     Interval history. BP is controlled   Only on nifedipine and Coreg at this time      Creatinine level 1.2---->  1.4 ---> 1.4 ---> 1.3   Blood pressure labile. I/O last 3 completed shifts:   In: 18 [I.V.:510]  Out: -     Past Medical History:   Diagnosis Date    Hyperlipidemia     Hypertension     Meningitis     02/2007    Smoker        Past Surgical History:   Procedure Laterality Date    HERNIA REPAIR      inguinal hernia , right, age 9         Current Medications: nitroGLYCERIN (NITROSTAT) 0.4 MG SL tablet,   NIFEdipine (PROCARDIA XL) extended release tablet 30 mg, Daily  0.9 % sodium chloride infusion, Continuous  [Held by provider] hydrALAZINE (APRESOLINE) injection 10 mg, Q6H PRN  niCARdipine (CARDENE) 25 mg in dextrose 5 % 250 mL infusion, Continuous  melatonin tablet 3 mg, Nightly PRN  atorvastatin (LIPITOR) tablet 20 mg, Nightly  albuterol sulfate  (90 Base) MCG/ACT inhaler 2 puff, Q6H PRN  sodium chloride flush 0.9 % injection 5-40 mL, 2 times per day  sodium chloride flush 0.9 % injection 10 mL, PRN  0.9 % sodium chloride infusion, PRN  enoxaparin (LOVENOX) injection 40 mg, Daily  ondansetron (ZOFRAN-ODT) disintegrating tablet 4 mg, Q8H PRN   Or  ondansetron (ZOFRAN) injection 4 mg, Q6H PRN  polyethylene glycol (GLYCOLAX) packet 17 g, Daily PRN  acetaminophen (TYLENOL) tablet 650 mg, Q6H PRN   Or  acetaminophen (TYLENOL) suppository 650 mg, Q6H PRN  [Held by provider] doxazosin (CARDURA) tablet 4 mg, Nightly  carvedilol (COREG) tablet 12.5 mg, BID WC  aspirin EC tablet 325 mg, Daily            Physical exam:     Vitals:  BP (!) 141/87   Pulse 69   Temp 97.5 °F (36.4 °C) (Temporal)   Resp 18   Ht 6' 2\" (1.88 m)   Wt 247 lb 12.8 oz (112.4 kg)   SpO2 95%   BMI 31.82 kg/m²   Constitutional:  OAA X3 NAD  Skin: no rash, turgor wnl  Heent:  eomi, mmm  Neck: no bruits or jvd noted  Cardiovascular:  S1, S2 without m/r/g  Respiratory: CTA B without w/r/r  Abdomen:  +bs, soft, nt, nd  Ext: no lower extremity edema      Labs:  CBC:   No results for input(s): WBC, HGB, PLT in the last 72 hours.   BMP:    Recent Labs     07/11/21  0456 07/12/21  0235 07/13/21  0205    138 137   K 4.7 4.5 4.2    102 101   CO2 24 25 25   BUN 21* 20 24*   CREATININE 1.4* 1.4* 1.3   GLUCOSE 109* 101* 102*     Ca/Mg/Phos:   Recent Labs     07/11/21  0456 07/12/21  0235 07/13/21  0205   CALCIUM 9.7 9.6 9.4     Hepatic:   No results for input(s): AST, ALT, ALB, BILITOT, ALKPHOS in the last 72 hours. Troponin:   Recent Labs     07/10/21  2333 07/11/21  0456 07/11/21  1853   TROPONINI <0.01 <0.01 <0.01     BNP: No results for input(s): BNP in the last 72 hours. Lipids: No results for input(s): CHOL, TRIG, HDL, LDLCALC, LABVLDL in the last 72 hours. ABGs: No results for input(s): PHART, PO2ART, EHW6FMD in the last 72 hours. INR: No results for input(s): INR in the last 72 hours. UA:No results for input(s): [place] , GLUCOSEU, BILIRUBINUR, Trip Banegas, BLOODU, PHUR, PROTEINU, UROBILINOGEN, NITRU, LEUKOCYTESUR, LABMICR, URINETYPE in the last 72 hours. Urine Microscopic: No results for input(s): LABCAST, BACTERIA, COMU, HYALCAST, WBCUA, RBCUA, EPIU in the last 72 hours. Urine Culture: No results for input(s): LABURIN in the last 72 hours. Urine Chemistry: No results for input(s): Lexi Hun, PROTEINUR, NAUR in the last 72 hours. IMAGING:  CTA CARDIAC W C STRC MORP W CONTRAST   Final Result   No significant coronary artery stenosis is identified. Right coronary dominance. XR CHEST PORTABLE   Final Result   Marginal inspiration, without evidence of acute cardiopulmonary disease         VL Renal Arterial Duplex Complete    (Results Pending)         Assessment/Plan :      1. Labile  HTN   coreg 12.5 mg po bid   Continue nifedipine XL 30 mg po once a day for now  If needed can increase to twice a day based on trend of BP       This creatinine level increased to 1.4 so held losartan    2. Likely has sleep apnea   Will get out pt sleep study     3. Obesity   BMI 34   Weight loss with diet and lifestyle changes       Renin/ aldosterone level . Check renal Doppler study. CTA per cardiology.   Gentle iv fluids today   BMP in am       Thank you for allowing us to participate in care of Margarito Devries         Electronically signed by: Julia Weinberg MD, 7/13/2021, 11:18 AM      [facility] [address]  Office : [phone]  Fax :[phone]

## 2021-07-14 VITALS
HEART RATE: 68 BPM | TEMPERATURE: 97.7 F | OXYGEN SATURATION: 98 % | BODY MASS INDEX: 32.08 KG/M2 | WEIGHT: 250 LBS | DIASTOLIC BLOOD PRESSURE: 84 MMHG | HEIGHT: 74 IN | SYSTOLIC BLOOD PRESSURE: 152 MMHG | RESPIRATION RATE: 16 BRPM

## 2021-07-14 LAB
ALDOSTERONE: 5.7 NG/DL
ANION GAP SERPL CALCULATED.3IONS-SCNC: 9 MMOL/L (ref 3–16)
BUN BLDV-MCNC: 19 MG/DL (ref 7–20)
CALCIUM SERPL-MCNC: 8.9 MG/DL (ref 8.3–10.6)
CHLORIDE BLD-SCNC: 105 MMOL/L (ref 99–110)
CO2: 24 MMOL/L (ref 21–32)
CREAT SERPL-MCNC: 1.2 MG/DL (ref 0.9–1.3)
GFR AFRICAN AMERICAN: >60
GFR NON-AFRICAN AMERICAN: >60
GLUCOSE BLD-MCNC: 96 MG/DL (ref 70–99)
HCT VFR BLD CALC: 43.3 % (ref 40.5–52.5)
HEMOGLOBIN: 14.9 G/DL (ref 13.5–17.5)
MCH RBC QN AUTO: 31.4 PG (ref 26–34)
MCHC RBC AUTO-ENTMCNC: 34.4 G/DL (ref 31–36)
MCV RBC AUTO: 91.1 FL (ref 80–100)
PDW BLD-RTO: 13.7 % (ref 12.4–15.4)
PLATELET # BLD: 210 K/UL (ref 135–450)
PMV BLD AUTO: 7.7 FL (ref 5–10.5)
POTASSIUM SERPL-SCNC: 4.6 MMOL/L (ref 3.5–5.1)
RBC # BLD: 4.76 M/UL (ref 4.2–5.9)
SODIUM BLD-SCNC: 138 MMOL/L (ref 136–145)
WBC # BLD: 6.7 K/UL (ref 4–11)

## 2021-07-14 PROCEDURE — 80048 BASIC METABOLIC PNL TOTAL CA: CPT

## 2021-07-14 PROCEDURE — 2580000003 HC RX 258: Performed by: INTERNAL MEDICINE

## 2021-07-14 PROCEDURE — 94760 N-INVAS EAR/PLS OXIMETRY 1: CPT

## 2021-07-14 PROCEDURE — 6370000000 HC RX 637 (ALT 250 FOR IP): Performed by: INTERNAL MEDICINE

## 2021-07-14 PROCEDURE — 99233 SBSQ HOSP IP/OBS HIGH 50: CPT | Performed by: INTERNAL MEDICINE

## 2021-07-14 PROCEDURE — 85027 COMPLETE CBC AUTOMATED: CPT

## 2021-07-14 RX ORDER — ALBUTEROL SULFATE 90 UG/1
2 AEROSOL, METERED RESPIRATORY (INHALATION) EVERY 6 HOURS PRN
Qty: 1 INHALER | Refills: 2 | COMMUNITY
Start: 2021-07-14

## 2021-07-14 RX ORDER — ASPIRIN 81 MG/1
81 TABLET ORAL DAILY
Qty: 30 TABLET | Refills: 3 | Status: SHIPPED | OUTPATIENT
Start: 2021-07-15 | End: 2021-08-09 | Stop reason: SDUPTHER

## 2021-07-14 RX ORDER — CARVEDILOL 12.5 MG/1
12.5 TABLET ORAL 2 TIMES DAILY WITH MEALS
Qty: 60 TABLET | Refills: 3 | Status: SHIPPED | OUTPATIENT
Start: 2021-07-14 | End: 2021-08-09 | Stop reason: SDUPTHER

## 2021-07-14 RX ORDER — NIFEDIPINE 30 MG/1
30 TABLET, FILM COATED, EXTENDED RELEASE ORAL DAILY
Qty: 30 TABLET | Refills: 2 | Status: SHIPPED | OUTPATIENT
Start: 2021-07-15 | End: 2021-08-02

## 2021-07-14 RX ADMIN — Medication 10 ML: at 08:33

## 2021-07-14 RX ADMIN — NIFEDIPINE 30 MG: 30 TABLET, FILM COATED, EXTENDED RELEASE ORAL at 08:33

## 2021-07-14 RX ADMIN — ASPIRIN 81 MG: 81 TABLET, COATED ORAL at 08:33

## 2021-07-14 RX ADMIN — CARVEDILOL 12.5 MG: 6.25 TABLET, FILM COATED ORAL at 08:33

## 2021-07-14 ASSESSMENT — PAIN SCALES - GENERAL
PAINLEVEL_OUTOF10: 0

## 2021-07-14 NOTE — PLAN OF CARE
Problem: Safety:  Goal: Free from accidental physical injury  Description: Free from accidental physical injury  Outcome: Ongoing  Goal: Free from intentional harm  Description: Free from intentional harm  Outcome: Ongoing     Problem: Pain:  Goal: Patient's pain/discomfort is manageable  Description: Patient's pain/discomfort is manageable  Outcome: Ongoing  Goal: Pain level will decrease  Description: Pain level will decrease  Outcome: Ongoing  Goal: Control of acute pain  Description: Control of acute pain  Outcome: Ongoing  Goal: Control of chronic pain  Description: Control of chronic pain  Outcome: Ongoing     Problem: Cardiac:  Goal: Ability to maintain an adequate cardiac output will improve  Description: Ability to maintain an adequate cardiac output will improve  Outcome: Ongoing  Goal: Hemodynamic stability will improve  Description: Hemodynamic stability will improve  Outcome: Ongoing     Problem: Fluid Volume:  Goal: Ability to achieve and maintain adequate urine output will improve  Description: Ability to achieve and maintain adequate urine output will improve  Outcome: Ongoing     Problem: Respiratory:  Goal: Respiratory status will improve  Description: Respiratory status will improve  Outcome: Ongoing

## 2021-07-14 NOTE — DISCHARGE SUMMARY
[facility]    Patient Demographics    Patient. Marion Tabares  Date of Birth. 1965  MRN. [id number]     Primary care provider. Nadia Kelley MD  (Tel: [phone])    Admit date: 7/10/2021    Discharge date (blank if same as Note Date): Note Date: 7/14/2021     Reason for Hospitalization. Chief Complaint   Patient presents with    Hypertension     pt states bp was elevated when he checked it tonight. does take home bp medication. denies headaches but states \"just not feeling right\". Significant Findings. Principal Problem:    Hypertensive emergency  Active Problems:    Renal insufficiency    Chest pain    Uncontrolled hypertension  Resolved Problems:    * No resolved hospital problems. *       Problems and results from this hospitalization that need follow up. 1. Suspected sleep apnea. Would benefit from sleep study. 2. HTN. Nephrology follow up in 2 weeks. Significant test results and incidental findings. CXR 7/10/2021:  Marginal inspiration, without evidence of acute cardiopulmonary disease    CTA Cardiac 7/13/2021:  No significant coronary artery stenosis is identified.       Right coronary dominance.         Renal Artery Duplex 7/13/2021:  Summary        -Bilateral renal artery ultrasound imaging was performed. No evidence of    renal artery stenosis. Renal veins are patent.    -Right lower pole cyst measuring 1.2 x 1.1 x 1.2 cm. Invasive procedures and treatments. 1. None     Problem-based Hospital Course. Patient is a 59 yo male with hx HTN, HLD, obesity. He had normal stress echo (8/2020). Patient presented to hospital with c/o elevated BP readings associated with numbness left forearm and dizziness. Reports compliance with medications. BP in /92 on presentation.     1. HTN emergency. BP on presentation up to 735 systolic.  Accompanied with chest pain, dizziness and numbness in forearm with possible TAYLOR. Initially received IV hydralazine and placed on nicardipine drip, weaned 7/12 and transitioned to po nifedipine and carvedilol with good control. Renal arterial duplex with no stenosis. Suspected to have sleep apnea and would benefit from sleep study.    2. Chest pain/chronic shortness of breath. Troponin negative, no acute findings on EKG. No evidence pulmonary edema on CXR, proBNP 19. Has had 2 stress tests with borderline or abnormal ECG components and normal imaging. CTA cardiac with no significant coronary stenosis. 3. Renal insufficiency. Creatinine 1.2->1.4 and losartan discontinued. No renal artery stenosis on doppler study. Creatinine 1.2 on DC. Patient says he is feeling much better and eager for DC home. Denies chest pain, shortness of breath, lightheadedness, dizziness, abdominal pain, nausea, constipation. Discharged home, no needs. Consults. IP CONSULT TO HOSPITALIST  IP CONSULT TO NEPHROLOGY  IP CONSULT TO CARDIOLOGY    Physical examination on discharge day. BP (!) 149/84   Pulse 68   Temp 97.7 °F (36.5 °C) (Temporal)   Resp 16   Ht 6' 2\" (1.88 m)   Wt 250 lb (113.4 kg)   SpO2 98%   BMI 32.10 kg/m²   General appearance. Alert. Looks comfortable. HEENT. Sclera clear. Moist mucus membranes. Cardiovascular. Regular rate and rhythm, normal S1, S2. No murmur. Respiratory. Not using accessory muscles. Clear to auscultation bilaterally, no wheeze. Gastrointestinal. Abdomen soft, non-tender, not distended, normal bowel sounds  Neurology. Facial symmetry. No speech deficits. Moving all extremities equally. Extremities. No edema in lower extremities. Skin. Warm, dry, normal turgor    Condition at time of discharge stable    Medication instructions provided to patient at discharge.      Medication List      START taking these medications    aspirin 81 MG EC tablet  Take 1 tablet by mouth daily  Start taking on: July 15, 2021  Notes to patient: Use: prevents heart attacks and strokes. Side effects: bleeding or bruising more easily, stomach upset. carvedilol 12.5 MG tablet  Commonly known as: COREG  Take 1 tablet by mouth 2 times daily (with meals)  Notes to patient: Use:  Lowers blood pressure and heart rate, takes workload off heart  Side effects:  Tiredness, shortness of breath, trouble sleeping, impotence     NIFEdipine 30 MG extended release tablet  Commonly known as: ADALAT CC  Take 1 tablet by mouth daily  Start taking on: July 15, 2021  Notes to patient: Use:  Relax blood vessels and increase the supply of blood and oxygen to the heart while also reducing the heart's workload   Side effects:  Lightheadedness,low blood pressure,slower heart rate, drowsiness and  swelling of feet ankles and legs           CHANGE how you take these medications    Ventolin  (90 Base) MCG/ACT inhaler  Generic drug: albuterol sulfate HFA  What changed: See the new instructions.   Notes to patient: Use:bronchodilator, to open airways, rescue inhaler  Side effects: nervousness, jitteriness, shakiness, headache, fast heartbeat        CONTINUE taking these medications    atorvastatin 20 MG tablet  Commonly known as: Lipitor  Take 1 tablet by mouth daily  Notes to patient: Use:  Cholesterol reduction  Side effects:  Muscle pain or soreness, stomach and intestinal upset     Blood Pressure Kit  Check BP twice a week/HTN        STOP taking these medications    doxazosin 4 MG tablet  Commonly known as: CARDURA     losartan 100 MG tablet  Commonly known as: COZAAR           Where to Get Your Medications      These medications were sent to [facility], [address] [place] [address], [address] [place] [address] Road    Phone: [phone]   · aspirin 81 MG EC tablet  · carvedilol 12.5 MG tablet  · NIFEdipine 30 MG extended release tablet     Received meds to beds    Discharge recommendations given to patient. Follow Up. PCP in 1 week, nephrology in 2 weeks  Disposition. home  Activity. activity as tolerated  Diet: ADULT DIET; Regular      Spent 40 minutes in discharge process.     Signed:  DEEPTI Dyer CNP     7/14/2021 9:53 AM

## 2021-07-14 NOTE — PROGRESS NOTES
CLINICAL PHARMACY NOTE: MEDS TO BEDS    Total # of Prescriptions Filled: 3   The following medications were delivered to the patient:  · Aspirin 81 mg  · Carvedilol 12.5 mg  · Nifedipine ER 30 mg    Additional Documentation:  Delivered to Patient-signed  Verna Hammer CPhT

## 2021-07-14 NOTE — PROGRESS NOTES
Office : [phone]     Fax :[phone]       Nephrology progress  Note      Patient's Name: Chauncey Harper  8:43 AM  7/14/2021    Reason for Consult:  Labile HTN      Chief Complaint:    Chief Complaint   Patient presents with    Hypertension     pt states bp was elevated when he checked it tonight. does take home bp medication. denies headaches but states \"just not feeling right\". History of Present Ilness:    Chauncey Harper is a 60 y.o. male with pmh of hyperlipidemia, essential hypertension, occasionally smokes cigar, obesity with BMI of 34.98 kg/m², who has chronic shortness of breath (ongoing for about 11 months now), who has had 2 negative stress tests over the past 2 years (normal stress echocardiogram in August 2021), who presents to the emergency room with complaints of elevated blood pressure reading. He also reports that he has some numbness in his left forearm, dizziness, both of which he typically gets whenever his blood pressure is elevated (in fact, that is how patient knows that his blood pressure is elevated). Wife with him at bedside   Has been consuming foods with high sodium content   Drink alcohol daily. Mainly wine. 2 drinks on average     C/o dizziness right now     Interval history. BP is controlled   Only on nifedipine and Coreg at this time      Creatinine level 1.2  Blood pressure labile. I/O last 3 completed shifts:   In: 1265.7 [I.V.:1265.7]  Out: -     Past Medical History:   Diagnosis Date    Hyperlipidemia     Hypertension     Meningitis     02/2007    Smoker        Past Surgical History:   Procedure Laterality Date    HERNIA REPAIR      inguinal hernia , right, age 9         Current Medications:    aspirin EC tablet 80 mg, Daily  NIFEdipine (PROCARDIA XL) extended release tablet 30 mg, Daily  0.9 % sodium chloride infusion, Continuous  [Held by provider] hydrALAZINE (APRESOLINE) injection 10 mg, Q6H PRN  niCARdipine (CARDENE) 25 mg in dextrose 5 % 250 mL infusion, Continuous  melatonin tablet 3 mg, Nightly PRN  atorvastatin (LIPITOR) tablet 20 mg, Nightly  albuterol sulfate  (90 Base) MCG/ACT inhaler 2 puff, Q6H PRN  sodium chloride flush 0.9 % injection 5-40 mL, 2 times per day  sodium chloride flush 0.9 % injection 10 mL, PRN  0.9 % sodium chloride infusion, PRN  enoxaparin (LOVENOX) injection 40 mg, Daily  ondansetron (ZOFRAN-ODT) disintegrating tablet 4 mg, Q8H PRN   Or  ondansetron (ZOFRAN) injection 4 mg, Q6H PRN  polyethylene glycol (GLYCOLAX) packet 17 g, Daily PRN  acetaminophen (TYLENOL) tablet 650 mg, Q6H PRN   Or  acetaminophen (TYLENOL) suppository 650 mg, Q6H PRN  carvedilol (COREG) tablet 12.5 mg, BID WC            Physical exam:     Vitals:  BP (!) 149/84   Pulse 68   Temp 97.7 °F (36.5 °C) (Temporal)   Resp 16   Ht 6' 2\" (1.88 m)   Wt 250 lb (113.4 kg)   SpO2 98%   BMI 32.10 kg/m²   Constitutional:  OAA X3 NAD  Skin: no rash, turgor wnl  Heent:  eomi, mmm  Neck: no bruits or jvd noted  Cardiovascular:  S1, S2 without m/r/g  Respiratory: CTA B without w/r/r  Abdomen:  +bs, soft, nt, nd  Ext: no lower extremity edema      Labs:  CBC:   Recent Labs     07/14/21  0455   WBC 6.7   HGB 14.9        BMP:    Recent Labs     07/12/21  0235 07/13/21  0205 07/14/21  0455    137 138   K 4.5 4.2 4.6    101 105   CO2 25 25 24   BUN 20 24* 19   CREATININE 1.4* 1.3 1.2   GLUCOSE 101* 102* 96     Ca/Mg/Phos:   Recent Labs     07/12/21  0235 07/13/21  0205 07/14/21  0455   CALCIUM 9.6 9.4 8.9     Hepatic:   No results for input(s): AST, ALT, ALB, BILITOT, ALKPHOS in the last 72 hours.   Troponin:   Recent Labs     07/11/21  1853   TROPONINI <0.01     BNP: No results for input(s): BNP in the last 72 hours. Lipids: No results for input(s): CHOL, TRIG, HDL, LDLCALC, LABVLDL in the last 72 hours. ABGs: No results for input(s): PHART, PO2ART, ISG2ILB in the last 72 hours. INR: No results for input(s): INR in the last 72 hours. UA:No results for input(s): Lucio [place], GLUCOSEU, BILIRUBINUR, Amaro Lozano, BLOODU, PHUR, PROTEINU, UROBILINOGEN, NITRU, LEUKOCYTESUR, LABMICR, URINETYPE in the last 72 hours. Urine Microscopic: No results for input(s): LABCAST, BACTERIA, COMU, HYALCAST, WBCUA, RBCUA, EPIU in the last 72 hours. Urine Culture: No results for input(s): LABURIN in the last 72 hours. Urine Chemistry: No results for input(s): Janell Nataly, PROTEINUR, NAUR in the last 72 hours. IMAGING:  VL Renal Arterial Duplex Complete   Final Result      CTA CARDIAC W C STRC MORP W CONTRAST   Final Result   No significant coronary artery stenosis is identified. Right coronary dominance. XR CHEST PORTABLE   Final Result   Marginal inspiration, without evidence of acute cardiopulmonary disease               Assessment/Plan :      1. Labile  HTN - now well controlled    coreg 12.5 mg po bid   Continue nifedipine XL 30 mg po once a day for now      His  creatinine level increased to 1.4 so held losartan    2. Likely has sleep apnea   Will get out pt sleep study     3. Obesity   BMI 34   Weight loss with diet and lifestyle changes       Renin/ aldosterone level pending . renal Doppler study ----> no renal artery stenosis. CTA per cardiology. Hypertension education given     ? Lose weight (if you are overweight)  ? Choose a diet low in fat and rich in fruits, vegetables, and low-fat dairy products  ? Reduce the amount of salt you eat, avoid sodas. ?Do something active for at least 30 minutes a day on most days of the week  ? Cut down on alcohol (if you drink more than 2 alcoholic drinks per day), if you smoke then try to quit.     F/u in office         Thank you for allowing us to participate in care of Antoniojae Wing         Electronically signed by: Katia Wong MD, 7/14/2021, 8:43 AM      [facility] [address]  Office : [phone]  Fax :[phone]

## 2021-07-14 NOTE — PROGRESS NOTES
Discharge instructions reviewed with patient and family member. Patient and family verbalized understanding. All home medications have been reviewed, questions answered and patient voiced understanding. All medication side effects reviewed and patient and family verbalized understanding. Follow up appointment(s) reviewed with patient and all attempts made to schedule within 7-10 days of discharge. Patient given prescriptions, discharge instructions, and appointment times. Patient discharged to home with family via private car. Patient and this RN ambulated to [facility] for discharge.

## 2021-07-15 ENCOUNTER — TELEPHONE (OUTPATIENT)
Dept: FAMILY MEDICINE CLINIC | Age: 56
End: 2021-07-15

## 2021-07-15 NOTE — TELEPHONE ENCOUNTER
[facility] Transitions Initial Follow Up Call    Outreach made within 2 business days of discharge: Yes    Patient: Jose Luis Whittaker Patient : 1965   MRN: [id number]  Reason for Admission: There are no discharge diagnoses documented for the most recent discharge. Discharge Date: 21       Spoke with: Left voicemail for patient to call office with questions, concerns and to schedule HFU.     Discharge department/facility: [facility]    Scheduled appointment with PCP within 7-14 days    Follow Up  Future Appointments   Date Time Provider Asher Morton   2021  2:30 PM Trinity Shore MD [facility]   10/18/2021  2:00 PM Henny Menjivar MD  SLEEP MED [facility]       Nanci Tripathi MA

## 2021-07-21 LAB — RENIN ACTIVITY: 2.8 NG/ML/HR

## 2021-08-02 ENCOUNTER — NURSE TRIAGE (OUTPATIENT)
Dept: OTHER | Facility: CLINIC | Age: 56
End: 2021-08-02

## 2021-08-02 NOTE — TELEPHONE ENCOUNTER
Reason for Disposition   Systolic BP >= 934 OR Diastolic >= 324    Answer Assessment - Initial Assessment Questions  1. BLOOD PRESSURE: \"What is the blood pressure? \" \"Did you take at least two measurements 5 minutes apart?\"      186/101 on waking this morning - no missed doses of BP meds     129/77 just few minutes ago    2. ONSET: \"When did you take your blood pressure? \"      Started having BP problems around the first of July    3. HOW: \"How did you obtain the blood pressure? \" (e.g., visiting nurse, automatic home BP monitor)      Home monitor around the arm    4. HISTORY: \"Do you have a history of high blood pressure? \"      Yes    5. MEDICATIONS: Tamea Alan you taking any medications for blood pressure? \" \"Have you missed any doses recently? \"      Yes - no missed doses    6. OTHER SYMPTOMS: \"Do you have any symptoms? \" (e.g., headache, chest pain, blurred vision, difficulty breathing, weakness)      No other symptoms - arm goes numb when pressure is high    7. PREGNANCY: \"Is there any chance you are pregnant? \" \"When was your last menstrual period? \"      No    Protocols used: HIGH BLOOD PRESSURE-ADULT-OH    Received call from [address] [place] [address] at [facility] with Red Flag Complaint. Brief description of triage: see triage above: Patient recently had a change to his blood pressure meds. On vacation last week he had to go to an ER because his blood pressure was 225/120. On waking this morning his pressure was 186/101. After taking his medications it is WNL. Triage indicates for patient to be seen today by his PCP. Care advice provided, patient verbalizes understanding; denies any other questions or concerns; instructed to call back for any new or worsening symptoms. Writer provided warm transfer to [facility] at [facility] for appointment scheduling. Attention Provider: Thank you for allowing me to participate in the care of your patient.   The patient was connected to triage in response to information provided to the [facility]. Please do not respond through this encounter as the response is not directed to a shared pool.

## 2021-08-09 RX ORDER — CARVEDILOL 12.5 MG/1
12.5 TABLET ORAL 2 TIMES DAILY WITH MEALS
Qty: 60 TABLET | Refills: 3 | Status: ON HOLD | OUTPATIENT
Start: 2021-08-09 | End: 2021-09-01 | Stop reason: SDUPTHER

## 2021-08-09 RX ORDER — ATORVASTATIN CALCIUM 20 MG/1
20 TABLET, FILM COATED ORAL DAILY
Qty: 30 TABLET | Refills: 3 | Status: SHIPPED | OUTPATIENT
Start: 2021-08-09

## 2021-08-09 RX ORDER — ASPIRIN 81 MG/1
81 TABLET ORAL DAILY
Qty: 30 TABLET | Refills: 3 | Status: SHIPPED | OUTPATIENT
Start: 2021-08-09 | End: 2021-12-29

## 2021-08-09 NOTE — TELEPHONE ENCOUNTER
Medication and Quantity requested: aspirin 81 MG EC tablet     carvedilol (COREG) 12.5 MG tablet    atorvastatin (LIPITOR) tablet 20 mg        Last Visit  12/15/20    Pharmacy and phone number updated in [facility]:  Yes    [facility]

## 2021-08-09 NOTE — TELEPHONE ENCOUNTER
Medication:   Requested Prescriptions     Pending Prescriptions Disp Refills    aspirin 81 MG EC tablet 30 tablet 3     Sig: Take 1 tablet by mouth daily    carvedilol (COREG) 12.5 MG tablet 60 tablet 3     Sig: Take 1 tablet by mouth 2 times daily (with meals)    atorvastatin (LIPITOR) 20 MG tablet 30 tablet 3     Sig: Take 1 tablet by mouth daily       Last Filled:  07/15/2021    Patient Phone Number: [phone] (home)     Last appt: 12/15/2020   Next appt: Visit date not found    Last Lipid:   Lab Results   Component Value Date    CHOL 242 03/26/2021    TRIG 402 03/26/2021    HDL 34 03/26/2021    LDLCALC see below 03/26/2021

## 2021-08-30 ENCOUNTER — HOSPITAL ENCOUNTER (OUTPATIENT)
Age: 56
Setting detail: OBSERVATION
Discharge: HOME | End: 2021-09-01
Attending: STUDENT IN AN ORGANIZED HEALTH CARE EDUCATION/TRAINING PROGRAM | Admitting: INTERNAL MEDICINE
Payer: COMMERCIAL

## 2021-08-30 ENCOUNTER — APPOINTMENT (OUTPATIENT)
Dept: CT IMAGING | Age: 56
End: 2021-08-30
Payer: COMMERCIAL

## 2021-08-30 DIAGNOSIS — R29.818 SUSPECTED SLEEP APNEA: ICD-10-CM

## 2021-08-30 DIAGNOSIS — G45.9 TIA (TRANSIENT ISCHEMIC ATTACK): Primary | ICD-10-CM

## 2021-08-30 PROBLEM — R20.2 PARESTHESIA: Status: ACTIVE | Noted: 2021-08-30

## 2021-08-30 LAB
A/G RATIO: 1.7 (ref 1.1–2.2)
ALBUMIN SERPL-MCNC: 4.9 G/DL (ref 3.4–5)
ALP BLD-CCNC: 95 U/L (ref 40–129)
ALT SERPL-CCNC: 24 U/L (ref 10–40)
ANION GAP SERPL CALCULATED.3IONS-SCNC: 13 MMOL/L (ref 3–16)
AST SERPL-CCNC: 18 U/L (ref 15–37)
BASOPHILS ABSOLUTE: 0.1 K/UL (ref 0–0.2)
BASOPHILS RELATIVE PERCENT: 0.7 %
BILIRUB SERPL-MCNC: 0.6 MG/DL (ref 0–1)
BUN BLDV-MCNC: 21 MG/DL (ref 7–20)
CALCIUM SERPL-MCNC: 9.7 MG/DL (ref 8.3–10.6)
CHLORIDE BLD-SCNC: 103 MMOL/L (ref 99–110)
CO2: 24 MMOL/L (ref 21–32)
CREAT SERPL-MCNC: 1.3 MG/DL (ref 0.9–1.3)
EKG ATRIAL RATE: 56 BPM
EKG DIAGNOSIS: NORMAL
EKG P AXIS: -23 DEGREES
EKG P-R INTERVAL: 130 MS
EKG Q-T INTERVAL: 422 MS
EKG QRS DURATION: 84 MS
EKG QTC CALCULATION (BAZETT): 407 MS
EKG R AXIS: 19 DEGREES
EKG T AXIS: 28 DEGREES
EKG VENTRICULAR RATE: 56 BPM
EOSINOPHILS ABSOLUTE: 0.2 K/UL (ref 0–0.6)
EOSINOPHILS RELATIVE PERCENT: 2.1 %
GFR AFRICAN AMERICAN: >60
GFR NON-AFRICAN AMERICAN: 57
GLOBULIN: 2.9 G/DL
GLUCOSE BLD-MCNC: 103 MG/DL (ref 70–99)
GLUCOSE BLD-MCNC: 105 MG/DL (ref 70–99)
HCT VFR BLD CALC: 42.9 % (ref 40.5–52.5)
HEMOGLOBIN: 15 G/DL (ref 13.5–17.5)
INR BLD: 0.96 (ref 0.88–1.12)
LYMPHOCYTES ABSOLUTE: 2.1 K/UL (ref 1–5.1)
LYMPHOCYTES RELATIVE PERCENT: 26.1 %
MCH RBC QN AUTO: 31.2 PG (ref 26–34)
MCHC RBC AUTO-ENTMCNC: 34.9 G/DL (ref 31–36)
MCV RBC AUTO: 89.5 FL (ref 80–100)
MONOCYTES ABSOLUTE: 0.8 K/UL (ref 0–1.3)
MONOCYTES RELATIVE PERCENT: 10 %
NEUTROPHILS ABSOLUTE: 5 K/UL (ref 1.7–7.7)
NEUTROPHILS RELATIVE PERCENT: 61.1 %
PDW BLD-RTO: 14.1 % (ref 12.4–15.4)
PERFORMED ON: ABNORMAL
PLATELET # BLD: 256 K/UL (ref 135–450)
PMV BLD AUTO: 7.1 FL (ref 5–10.5)
POTASSIUM REFLEX MAGNESIUM: 4.7 MMOL/L (ref 3.5–5.1)
PROTHROMBIN TIME: 10.8 SEC (ref 9.9–12.7)
RBC # BLD: 4.79 M/UL (ref 4.2–5.9)
SODIUM BLD-SCNC: 140 MMOL/L (ref 136–145)
TOTAL PROTEIN: 7.8 G/DL (ref 6.4–8.2)
TROPONIN: <0.01 NG/ML
WBC # BLD: 8.2 K/UL (ref 4–11)

## 2021-08-30 PROCEDURE — 70496 CT ANGIOGRAPHY HEAD: CPT

## 2021-08-30 PROCEDURE — 6370000000 HC RX 637 (ALT 250 FOR IP): Performed by: STUDENT IN AN ORGANIZED HEALTH CARE EDUCATION/TRAINING PROGRAM

## 2021-08-30 PROCEDURE — 85610 PROTHROMBIN TIME: CPT

## 2021-08-30 PROCEDURE — 70498 CT ANGIOGRAPHY NECK: CPT

## 2021-08-30 PROCEDURE — G0378 HOSPITAL OBSERVATION PER HR: HCPCS

## 2021-08-30 PROCEDURE — 2580000003 HC RX 258: Performed by: NURSE PRACTITIONER

## 2021-08-30 PROCEDURE — 93010 ELECTROCARDIOGRAM REPORT: CPT | Performed by: INTERNAL MEDICINE

## 2021-08-30 PROCEDURE — 99284 EMERGENCY DEPT VISIT MOD MDM: CPT

## 2021-08-30 PROCEDURE — 82607 VITAMIN B-12: CPT

## 2021-08-30 PROCEDURE — 36415 COLL VENOUS BLD VENIPUNCTURE: CPT

## 2021-08-30 PROCEDURE — 93005 ELECTROCARDIOGRAM TRACING: CPT | Performed by: STUDENT IN AN ORGANIZED HEALTH CARE EDUCATION/TRAINING PROGRAM

## 2021-08-30 PROCEDURE — 70450 CT HEAD/BRAIN W/O DYE: CPT

## 2021-08-30 PROCEDURE — 82746 ASSAY OF FOLIC ACID SERUM: CPT

## 2021-08-30 PROCEDURE — 85025 COMPLETE CBC W/AUTO DIFF WBC: CPT

## 2021-08-30 PROCEDURE — 6360000004 HC RX CONTRAST MEDICATION: Performed by: STUDENT IN AN ORGANIZED HEALTH CARE EDUCATION/TRAINING PROGRAM

## 2021-08-30 PROCEDURE — 80053 COMPREHEN METABOLIC PANEL: CPT

## 2021-08-30 PROCEDURE — 84484 ASSAY OF TROPONIN QUANT: CPT

## 2021-08-30 RX ORDER — SODIUM CHLORIDE 0.9 % (FLUSH) 0.9 %
5-40 SYRINGE (ML) INJECTION PRN
Status: DISCONTINUED | OUTPATIENT
Start: 2021-08-30 | End: 2021-09-01 | Stop reason: HOSPADM

## 2021-08-30 RX ORDER — ALBUTEROL SULFATE 90 UG/1
2 AEROSOL, METERED RESPIRATORY (INHALATION) EVERY 6 HOURS PRN
Status: DISCONTINUED | OUTPATIENT
Start: 2021-08-30 | End: 2021-09-01 | Stop reason: HOSPADM

## 2021-08-30 RX ORDER — CARVEDILOL 6.25 MG/1
12.5 TABLET ORAL 2 TIMES DAILY WITH MEALS
Status: DISCONTINUED | OUTPATIENT
Start: 2021-08-31 | End: 2021-09-01 | Stop reason: HOSPADM

## 2021-08-30 RX ORDER — ATORVASTATIN CALCIUM 20 MG/1
20 TABLET, FILM COATED ORAL DAILY
Status: DISCONTINUED | OUTPATIENT
Start: 2021-08-31 | End: 2021-09-01 | Stop reason: HOSPADM

## 2021-08-30 RX ORDER — ACETAMINOPHEN 650 MG/1
650 SUPPOSITORY RECTAL EVERY 6 HOURS PRN
Status: DISCONTINUED | OUTPATIENT
Start: 2021-08-30 | End: 2021-09-01 | Stop reason: HOSPADM

## 2021-08-30 RX ORDER — LOSARTAN POTASSIUM 25 MG/1
50 TABLET ORAL ONCE
Status: COMPLETED | OUTPATIENT
Start: 2021-08-30 | End: 2021-08-30

## 2021-08-30 RX ORDER — ACETAMINOPHEN 325 MG/1
650 TABLET ORAL EVERY 6 HOURS PRN
Status: DISCONTINUED | OUTPATIENT
Start: 2021-08-30 | End: 2021-09-01 | Stop reason: HOSPADM

## 2021-08-30 RX ORDER — ONDANSETRON 4 MG/1
4 TABLET, ORALLY DISINTEGRATING ORAL EVERY 8 HOURS PRN
Status: DISCONTINUED | OUTPATIENT
Start: 2021-08-30 | End: 2021-09-01 | Stop reason: HOSPADM

## 2021-08-30 RX ORDER — ASPIRIN 325 MG
325 TABLET ORAL ONCE
Status: COMPLETED | OUTPATIENT
Start: 2021-08-30 | End: 2021-08-30

## 2021-08-30 RX ORDER — SODIUM CHLORIDE 9 MG/ML
25 INJECTION, SOLUTION INTRAVENOUS PRN
Status: DISCONTINUED | OUTPATIENT
Start: 2021-08-30 | End: 2021-09-01 | Stop reason: HOSPADM

## 2021-08-30 RX ORDER — ASPIRIN 81 MG/1
81 TABLET ORAL DAILY
Status: DISCONTINUED | OUTPATIENT
Start: 2021-08-31 | End: 2021-09-01 | Stop reason: HOSPADM

## 2021-08-30 RX ORDER — SODIUM CHLORIDE 0.9 % (FLUSH) 0.9 %
5-40 SYRINGE (ML) INJECTION EVERY 12 HOURS SCHEDULED
Status: DISCONTINUED | OUTPATIENT
Start: 2021-08-30 | End: 2021-09-01 | Stop reason: HOSPADM

## 2021-08-30 RX ORDER — LOSARTAN POTASSIUM 25 MG/1
50 TABLET ORAL DAILY
Status: DISCONTINUED | OUTPATIENT
Start: 2021-08-31 | End: 2021-09-01 | Stop reason: HOSPADM

## 2021-08-30 RX ORDER — POLYETHYLENE GLYCOL 3350 17 G/17G
17 POWDER, FOR SOLUTION ORAL DAILY PRN
Status: DISCONTINUED | OUTPATIENT
Start: 2021-08-30 | End: 2021-09-01 | Stop reason: HOSPADM

## 2021-08-30 RX ORDER — ONDANSETRON 2 MG/ML
4 INJECTION INTRAMUSCULAR; INTRAVENOUS EVERY 6 HOURS PRN
Status: DISCONTINUED | OUTPATIENT
Start: 2021-08-30 | End: 2021-09-01 | Stop reason: HOSPADM

## 2021-08-30 RX ORDER — NIFEDIPINE 30 MG/1
30 TABLET, EXTENDED RELEASE ORAL ONCE
Status: COMPLETED | OUTPATIENT
Start: 2021-08-30 | End: 2021-08-30

## 2021-08-30 RX ORDER — NIFEDIPINE 30 MG/1
30 TABLET, EXTENDED RELEASE ORAL 2 TIMES DAILY
Status: DISCONTINUED | OUTPATIENT
Start: 2021-08-31 | End: 2021-09-01 | Stop reason: HOSPADM

## 2021-08-30 RX ORDER — CARVEDILOL 3.12 MG/1
12.5 TABLET ORAL ONCE
Status: COMPLETED | OUTPATIENT
Start: 2021-08-30 | End: 2021-08-30

## 2021-08-30 RX ADMIN — NIFEDIPINE 30 MG: 30 TABLET, FILM COATED, EXTENDED RELEASE ORAL at 19:03

## 2021-08-30 RX ADMIN — IOPAMIDOL 75 ML: 755 INJECTION, SOLUTION INTRAVENOUS at 15:47

## 2021-08-30 RX ADMIN — LOSARTAN POTASSIUM 50 MG: 25 TABLET, FILM COATED ORAL at 19:03

## 2021-08-30 RX ADMIN — ASPIRIN 325 MG ORAL TABLET 325 MG: 325 PILL ORAL at 19:03

## 2021-08-30 RX ADMIN — Medication 10 ML: at 22:42

## 2021-08-30 RX ADMIN — CARVEDILOL 12.5 MG: 3.12 TABLET, FILM COATED ORAL at 19:04

## 2021-08-30 ASSESSMENT — ENCOUNTER SYMPTOMS
SHORTNESS OF BREATH: 0
EYES NEGATIVE: 1
BLOOD IN STOOL: 0
CHEST TIGHTNESS: 0
BACK PAIN: 0
GASTROINTESTINAL NEGATIVE: 1
CONSTIPATION: 0
ABDOMINAL PAIN: 0
RESPIRATORY NEGATIVE: 1
SORE THROAT: 0

## 2021-08-30 ASSESSMENT — PAIN SCALES - GENERAL: PAINLEVEL_OUTOF10: 0

## 2021-08-30 NOTE — ED NOTES
Bed: 18  Expected date:   Expected time:   Means of arrival:   Comments:  Jennifer Little RN  08/30/21 [id number]

## 2021-08-30 NOTE — ED PROVIDER NOTES
[facility]      Pt Name: Kimberly Pope  MRN: [id number]  [place] 1965  Date of evaluation: 8/30/2021  Provider: Richard Morales MD    CHIEF COMPLAINT       Chief Complaint   Patient presents with    Numbness     arrived per self d/t MD sent to get an ultrasound on left carotid d/t tingling on left neck and left arm         HISTORY OF PRESENT ILLNESS   (Location/Symptom, Timing/Onset, Context/Setting, Quality, Duration, Modifying Factors, Severity)  Note limiting factors. Kimberly Pope is a 60 y.o. male who presents to the emergency department for evaluation of intermittent left face and left arm numbness and tingling sensation. Patient was at a visit with his nephrologist immediately prior to arrival here. He was having a visit regarding his elevated blood pressure. He endorsed that he has been having intermittent left lip, left cheek and left upper extremity numbness and tingling since Saturday. He states that on Saturday, symptoms lasted almost the entire day before resolving. He also had an episode this morning lasting about an hour which he endorsed to his nephrologist.  His nephrologist wanted him sent to the emergency room for evaluation of stroke. The patient denies any known stroke history. He is only on baby aspirin therapy. Currently he is not having any symptoms. HPI    Nursing Notes were reviewed. REVIEW OF SYSTEMS    (2-9 systems for level 4, 10 or more for level 5)     Review of Systems   Constitutional: Negative for chills and fatigue. HENT: Negative for congestion and sore throat. Respiratory: Negative for chest tightness and shortness of breath. Cardiovascular: Negative for chest pain and leg swelling. Gastrointestinal: Negative for abdominal pain, blood in stool and constipation. Genitourinary: Negative for dysuria and frequency. Musculoskeletal: Negative for arthralgias and back pain. Skin: Negative for rash and wound. Neurological: Positive for numbness. Negative for dizziness, syncope and headaches. Paresthesia    Psychiatric/Behavioral: Negative for confusion and decreased concentration. Except as noted above the remainder of the review of systems was reviewed and negative.        PAST MEDICAL HISTORY     Past Medical History:   Diagnosis Date    Hyperlipidemia     Hypertension     Meningitis     02/2007    Smoker          SURGICAL HISTORY       Past Surgical History:   Procedure Laterality Date    HERNIA REPAIR      inguinal hernia , right, age 9         CURRENT MEDICATIONS       Previous Medications    ALBUTEROL SULFATE HFA (VENTOLIN HFA) 108 (90 BASE) MCG/ACT INHALER    Inhale 2 puffs into the lungs every 6 hours as needed for Wheezing    ASPIRIN 81 MG EC TABLET    Take 1 tablet by mouth daily    ATORVASTATIN (LIPITOR) 20 MG TABLET    Take 1 tablet by mouth daily    BLOOD PRESSURE KIT    Check BP twice a week/HTN    CARVEDILOL (COREG) 12.5 MG TABLET    Take 1 tablet by mouth 2 times daily (with meals)    LOSARTAN (COZAAR) 50 MG TABLET    Take 50 mg by mouth daily    NIFEDIPINE (ADALAT CC) 30 MG EXTENDED RELEASE TABLET    Take 1 tablet by mouth 2 times daily       ALLERGIES     Hctz [hydrochlorothiazide] and Lisinopril    FAMILY HISTORY       Family History   Problem Relation Age of Onset    Heart Disease Father     Hypertension Mother           SOCIAL HISTORY       Social History     Socioeconomic History    Marital status:      Spouse name: None    Number of children: None    Years of education: None    Highest education level: None   Occupational History    None   Tobacco Use    Smoking status: Former Smoker     Packs/day: 0.50     Years: 30.00     Pack years: 15.00     Types: Cigarettes     Quit date: 2018     Years since quitting: 3.6    Smokeless tobacco: Never Used   Vaping Use    Vaping Use: Never used   Substance and Sexual Activity    Alcohol use: Yes     Alcohol/week: 6.0 standard drinks     Types: 6 Standard drinks or equivalent per week     Comment: occasional drinker    Drug use: No    Sexual activity: Yes   Other Topics Concern    None   Social History Narrative    None     Social Determinants of Health     Financial Resource Strain:     Difficulty of Paying Living Expenses:    Food Insecurity:     Worried About Running Out of Food in the Last Year:     Ran Out of Food in the Last Year:    Transportation Needs:     Lack of Transportation (Medical):  Lack of Transportation (Non-Medical):    Physical Activity:     Days of Exercise per Week:     Minutes of Exercise per Session:    Stress:     Feeling of Stress :    Social Connections:     Frequency of Communication with Friends and Family:     Frequency of Social Gatherings with Friends and Family:     Attends Yarsani Services:     Active Member of Clubs or Organizations:     Attends Club or Organization Meetings:     Marital Status:    Intimate Partner Violence:     Fear of Current or Ex-Partner:     Emotionally Abused:     Physically Abused:     Sexually Abused:        SCREENINGS   NIH Stroke Scale  NIH Stroke Scale Assessed: Yes  Interval: Baseline  Level of Consciousness (1a. ): Alert  LOC Questions (1b. ):  Answers both correctly  LOC Commands (1c. ): Performs both tasks correctly  Best Gaze (2. ): Normal  Visual (3. ): No visual loss  Facial Palsy (4. ): Normal symmetrical movement  Motor Arm, Left (5a. ): No drift  Motor Arm, Right (5b. ): No drift  Motor Leg, Left (6a. ): No drift  Motor Leg, Right (6b. ): No drift  Limb Ataxia (7. ): Absent  Sensory (8. ): Normal  Best Language (9. ): No aphasia  Dysarthria (10. ): Normal  Extinction and Inattention (11): No abnormality  Total: 0                    PHYSICAL EXAM    (up to 7 for level 4, 8 or more for level 5)     ED Triage Vitals [08/30/21 1356]   BP Temp Temp Source Pulse Resp SpO2 Height Weight   (!) 167/89 97.1 °F (36.2 °C) Tympanic 56 18 97 % 6' (1.829 m) 244 lb (110.7 kg)       Physical Exam  Constitutional:       Appearance: Normal appearance. HENT:      Head: Normocephalic and atraumatic. Eyes:      General:         Right eye: No discharge. Left eye: No discharge. Conjunctiva/sclera: Conjunctivae normal.   Cardiovascular:      Rate and Rhythm: Normal rate and regular rhythm. Heart sounds: Normal heart sounds. No murmur heard. Pulmonary:      Effort: Pulmonary effort is normal. No respiratory distress. Breath sounds: Normal breath sounds. Musculoskeletal:         General: No swelling or tenderness. Right lower leg: No edema. Left lower leg: No edema. Skin:     General: Skin is warm and dry. Capillary Refill: Capillary refill takes less than 2 seconds. Neurological:      Mental Status: He is alert. Comments:   Mentation normal. Answering questions and following commands appropriately. No facial droop   EOM intact, pupils ERRL  No dysmetria on finger to nose bilaterally. 5/5 extension and flexion in the shoulder, elbow, wrist and  of RUE and LUE. Shoulder shrug with 5/5 strength  5/5 extension and flexion in the hip, knee, ankle and toes of RLE and LLE. Sensation is intact and symmetric between RUE and LUE. Sensation is intact and symmetric between RLE and LLE. Gait is steady and without abnormalities     Psychiatric:         Mood and Affect: Mood normal.         Behavior: Behavior normal.         DIAGNOSTIC RESULTS     EKG: All EKG's are interpreted by the Emergency Department Physician who either signs or Co-signs this chart in the absence of a cardiologist.  The Ekg interpreted by me in the absence of a cardiologist shows. sinus bradycardia, rate=56   Axis is   Normal  QTc is  normal  Intervals and Durations are unremarkable. No specific ST-T wave changes appreciated. No evidence of acute ischemia.    No significant change from prior EKG dated 7/10/21    RADIOLOGY:   Non-plain film images such as CT, Ultrasound and MRI are read by the radiologist. Plain radiographic images are visualized and preliminarily interpreted by the emergency physician with the below findings:    Interpretation per the Radiologist below, if available at the time of this note:    CTA HEAD W CONTRAST   Final Result   No flow limiting stenosis or large vessel occlusion detected within the head   or neck. CTA NECK W CONTRAST   Final Result   No flow limiting stenosis or large vessel occlusion detected within the head   or neck. CT HEAD WO CONTRAST   Final Result   No acute intracranial abnormality. LABS:  Labs Reviewed   COMPREHENSIVE METABOLIC PANEL W/ REFLEX TO MG FOR LOW K - Abnormal; Notable for the following components:       Result Value    Glucose 105 (*)     BUN 21 (*)     GFR Non- 57 (*)     All other components within normal limits    Narrative:     Performed at:  [facility] [address], [facility], [address] [employer]   Phone [phone]   POCT GLUCOSE - Abnormal; Notable for the following components:    POC Glucose 103 (*)     All other components within normal limits    Narrative:     Performed at:  [facility] [address], [facility], [facility] [employer]   Phone [phone]   CBC WITH AUTO DIFFERENTIAL    Narrative:     Performed at:  [facility] [address], [facility], [facility] [employer]   Phone [phone]   TROPONIN    Narrative:     Performed at:  [facility] [address], [facility], [address] [employer]   Phone [phone]   PROTIME-INR    Narrative:     Performed at:  [facility] [address], [facility], [facility] [employer]   Phone [phone]   POCT GLUCOSE       All other labs were within normal range or not returned as of this dictation.     EMERGENCY DEPARTMENT COURSE and DIFFERENTIAL DIAGNOSIS/MDM:   Vitals:    Vitals:    08/30/21 1356   BP: (!) 167/89   Pulse: 56   Resp: 18   Temp: 97.1 °F (36.2 °C)   TempSrc: Tympanic   SpO2: 97%   Weight: 244 lb (110.7 kg)   Height: 6' (1.829 m)     Medications   aspirin tablet 325 mg (has no administration in time range)   iopamidol (ISOVUE-370) 76 % injection 75 mL (75 mLs IntraVENous Given 8/30/21 1547)          Patient is a 42-year-old male presenting with intermittent left face and left arm numbness and tingling. Last known well greater than 7 days. In the emergency room he is asymptomatic. NIH is 0. He is hypertensive to 637 systolic. ABCD 2 score is 3. On contrasted CT head is negative for bleed. CTA negative for LVO. He would not be a TPA candidate due to no current deficits and extended time since last known well. We will give aspirin load for significant TIA with plan to admit for further stroke work-up. He is agreeable to plan. PROCEDURES:  Unless otherwise noted below, none     Procedures        FINAL IMPRESSION      1. TIA (transient ischemic attack)          DISPOSITION/PLAN   DISPOSITION Decision To Admit 08/30/2021 02:28:02 PM      PATIENT REFERRED TO:  No follow-up provider specified. DISCHARGE MEDICATIONS:      New Prescriptions    No medications on file       Controlled Substances Monitoring:    If the patient was prescribed a controlled substance today, the PDMP was reviewed as documented below. No flowsheet data found.     (Please note that portions of this note were completed with a voice recognition program.  Efforts were made to edit the dictations but occasionally words are mis-transcribed.)    Fadi Landon MD (electronically signed)  Attending Emergency Physician         Lien Gna MD  08/30/21 5989 Inocencio Hyman MD  08/30/21 2773

## 2021-08-31 ENCOUNTER — APPOINTMENT (OUTPATIENT)
Dept: MRI IMAGING | Age: 56
End: 2021-08-31
Payer: COMMERCIAL

## 2021-08-31 LAB
A/G RATIO: 1.6 (ref 1.1–2.2)
ALBUMIN SERPL-MCNC: 4.4 G/DL (ref 3.4–5)
ALP BLD-CCNC: 78 U/L (ref 40–129)
ALT SERPL-CCNC: 23 U/L (ref 10–40)
ANION GAP SERPL CALCULATED.3IONS-SCNC: 10 MMOL/L (ref 3–16)
AST SERPL-CCNC: 17 U/L (ref 15–37)
BASOPHILS ABSOLUTE: 0 K/UL (ref 0–0.2)
BASOPHILS RELATIVE PERCENT: 0.3 %
BILIRUB SERPL-MCNC: 0.5 MG/DL (ref 0–1)
BUN BLDV-MCNC: 20 MG/DL (ref 7–20)
CALCIUM SERPL-MCNC: 9.8 MG/DL (ref 8.3–10.6)
CHLORIDE BLD-SCNC: 104 MMOL/L (ref 99–110)
CHOLESTEROL, TOTAL: 191 MG/DL (ref 0–199)
CO2: 25 MMOL/L (ref 21–32)
CREAT SERPL-MCNC: 1.2 MG/DL (ref 0.9–1.3)
EOSINOPHILS ABSOLUTE: 0.2 K/UL (ref 0–0.6)
EOSINOPHILS RELATIVE PERCENT: 2.6 %
ESTIMATED AVERAGE GLUCOSE: 119.8 MG/DL
FOLATE: 14.54 NG/ML (ref 4.78–24.2)
GFR AFRICAN AMERICAN: >60
GFR NON-AFRICAN AMERICAN: >60
GLOBULIN: 2.7 G/DL
GLUCOSE BLD-MCNC: 96 MG/DL (ref 70–99)
HBA1C MFR BLD: 5.8 %
HCT VFR BLD CALC: 42.8 % (ref 40.5–52.5)
HDLC SERPL-MCNC: 32 MG/DL (ref 40–60)
HEMOGLOBIN: 14.8 G/DL (ref 13.5–17.5)
LDL CHOLESTEROL CALCULATED: 107 MG/DL
LV EF: 58 %
LVEF MODALITY: NORMAL
LYMPHOCYTES ABSOLUTE: 2.1 K/UL (ref 1–5.1)
LYMPHOCYTES RELATIVE PERCENT: 30.2 %
MCH RBC QN AUTO: 31 PG (ref 26–34)
MCHC RBC AUTO-ENTMCNC: 34.6 G/DL (ref 31–36)
MCV RBC AUTO: 89.6 FL (ref 80–100)
MONOCYTES ABSOLUTE: 0.8 K/UL (ref 0–1.3)
MONOCYTES RELATIVE PERCENT: 12.1 %
NEUTROPHILS ABSOLUTE: 3.8 K/UL (ref 1.7–7.7)
NEUTROPHILS RELATIVE PERCENT: 54.8 %
PDW BLD-RTO: 13.9 % (ref 12.4–15.4)
PLATELET # BLD: 234 K/UL (ref 135–450)
PMV BLD AUTO: 7.1 FL (ref 5–10.5)
POTASSIUM REFLEX MAGNESIUM: 4.1 MMOL/L (ref 3.5–5.1)
RBC # BLD: 4.78 M/UL (ref 4.2–5.9)
SODIUM BLD-SCNC: 139 MMOL/L (ref 136–145)
TOTAL PROTEIN: 7.1 G/DL (ref 6.4–8.2)
TRIGL SERPL-MCNC: 261 MG/DL (ref 0–150)
VITAMIN B-12: 545 PG/ML (ref 211–911)
VLDLC SERPL CALC-MCNC: 52 MG/DL
WBC # BLD: 7 K/UL (ref 4–11)

## 2021-08-31 PROCEDURE — 6370000000 HC RX 637 (ALT 250 FOR IP): Performed by: NURSE PRACTITIONER

## 2021-08-31 PROCEDURE — G0378 HOSPITAL OBSERVATION PER HR: HCPCS

## 2021-08-31 PROCEDURE — 93306 TTE W/DOPPLER COMPLETE: CPT

## 2021-08-31 PROCEDURE — 80061 LIPID PANEL: CPT

## 2021-08-31 PROCEDURE — 2580000003 HC RX 258: Performed by: NURSE PRACTITIONER

## 2021-08-31 PROCEDURE — 85025 COMPLETE CBC W/AUTO DIFF WBC: CPT

## 2021-08-31 PROCEDURE — 83036 HEMOGLOBIN GLYCOSYLATED A1C: CPT

## 2021-08-31 PROCEDURE — 36415 COLL VENOUS BLD VENIPUNCTURE: CPT

## 2021-08-31 PROCEDURE — 92526 ORAL FUNCTION THERAPY: CPT

## 2021-08-31 PROCEDURE — 92610 EVALUATE SWALLOWING FUNCTION: CPT

## 2021-08-31 PROCEDURE — 70551 MRI BRAIN STEM W/O DYE: CPT

## 2021-08-31 PROCEDURE — 80053 COMPREHEN METABOLIC PANEL: CPT

## 2021-08-31 RX ADMIN — Medication 10 ML: at 20:50

## 2021-08-31 RX ADMIN — NIFEDIPINE 30 MG: 30 TABLET, FILM COATED, EXTENDED RELEASE ORAL at 20:50

## 2021-08-31 RX ADMIN — ASPIRIN 81 MG: 81 TABLET, COATED ORAL at 08:19

## 2021-08-31 RX ADMIN — LOSARTAN POTASSIUM 50 MG: 25 TABLET, FILM COATED ORAL at 08:19

## 2021-08-31 RX ADMIN — CARVEDILOL 12.5 MG: 6.25 TABLET, FILM COATED ORAL at 09:52

## 2021-08-31 RX ADMIN — NIFEDIPINE 30 MG: 30 TABLET, FILM COATED, EXTENDED RELEASE ORAL at 08:19

## 2021-08-31 RX ADMIN — Medication 10 ML: at 08:22

## 2021-08-31 RX ADMIN — ATORVASTATIN CALCIUM 20 MG: 20 TABLET, FILM COATED ORAL at 08:20

## 2021-08-31 ASSESSMENT — PAIN SCALES - GENERAL
PAINLEVEL_OUTOF10: 0

## 2021-08-31 NOTE — PROGRESS NOTES
Hospitalist Progress Note      PCP: Sandrita Sahu MD    Date of Admission: 8/30/2021    Chief Complaint: Paresthesia    Hospital Course: 60 y.o. male with history of hypertension, hyperlipidemia, and obesity. Patient presents the emergency room for left arm and face paresthesias. He reports he was having symptoms all day on Saturday and again on Sunday morning. Denies any slurred speech or weakness. He denies any headache. This morning his systolic blood pressure was 180 prior to going to work. Patient does state that he knows when his blood pressure is elevated. On Saturday however when he was having the paresthesias symptoms he reports his blood pressure systolics 548A. He did see Dr. Janie Sloan earlier today and was referred to the emergency room due to his symptoms. Currently he states the numbness has improved. He was given aspirin in the emergency room.     He has been following with Dr. Janie Sloan Nephrology for labile HTN. Renin and aldosterone levels were normal. Renal doppler showed no artery stenosis. Subjective: Patient seen and examined at bedside. Tingling in the left forearm hand and entire left side of the face, persistent and unchanged.       Medications:  Reviewed    Infusion Medications    sodium chloride       Scheduled Medications    aspirin  81 mg Oral Daily    atorvastatin  20 mg Oral Daily    losartan  50 mg Oral Daily    carvedilol  12.5 mg Oral BID WC    NIFEdipine  30 mg Oral BID    sodium chloride flush  5-40 mL IntraVENous 2 times per day    enoxaparin  40 mg SubCUTAneous Daily     PRN Meds: perflutren lipid microspheres, albuterol sulfate HFA, sodium chloride flush, sodium chloride, ondansetron **OR** ondansetron, polyethylene glycol, acetaminophen **OR** acetaminophen      Intake/Output Summary (Last 24 hours) at 8/31/2021 1357  Last data filed at 8/30/2021 2242  Gross per 24 hour   Intake 10 ml   Output    Net 10 ml       Physical Exam Performed:    BP stenosis or large vessel occlusion detected within the head   or neck. CT HEAD WO CONTRAST   Final Result   No acute intracranial abnormality. MRI BRAIN WO CONTRAST    (Results Pending)           Assessment/Plan:    Active Hospital Problems    Diagnosis     Paresthesia [R20.2]      Paresthesia  Concern for TIA/CVA  Work-up in progress  MRI  Echo  Neurology consultation  PT OT    Labile hypertension  Reasonable while here    Bradycardia  No block on EKG  Possibly physiologic with patient's fitness level  Echo ordered    Obesity by BMI  Patient with high muscle mass    DVT Prophylaxis: Lovenox  Diet: ADULT DIET; Regular;  No Added Salt (3-4 gm)  Code Status: Full Code    PT/OT Eval Status: Pending      Electronically signed by Shine Downs MD on 8/31/2021 at 1:57 PM

## 2021-08-31 NOTE — PROGRESS NOTES
Admission complete. Pt alert and oriented x4. Vital signs stable. Telemetry on. NIHSS 0. Call light within reach.

## 2021-08-31 NOTE — H&P
HOSPITALISTS HISTORY AND PHYSICAL    8/30/2021 8:30 PM    Patient Information:  Adriane Blood is a 60 y.o. male [id number]  PCP:  Suzanne Lockwood MD (Tel: [phone] )    Chief complaint:    Chief Complaint   Patient presents with    Numbness     arrived per self d/t MD sent to get an ultrasound on left carotid d/t tingling on left neck and left arm        History of Present Illness:  Nikita Mayfield is a 60 y.o. male with history of hypertension, hyperlipidemia, and obesity. Patient presents the emergency room for left arm and face paresthesias. He reports he was having symptoms all day on Saturday and again on Sunday morning. Denies any slurred speech or weakness. He denies any headache. This morning his systolic blood pressure was 180 prior to going to work. Patient does state that he knows when his blood pressure is elevated. On Saturday however when he was having the paresthesias symptoms he reports his blood pressure systolics 914J. He did see Dr. Jimena Blackwell earlier today and was referred to the emergency room due to his symptoms. Currently he states the numbness has improved. He was given aspirin in the emergency room. He has been following with Dr. Jimena Blackwell Nephrology for labile HTN. Renin and aldosterone levels were normal. Renal doppler showed no artery stenosis. History obtained from patient    REVIEW OF SYSTEMS:   Review of Systems   Constitutional: Negative. HENT: Positive for ear pain. Eyes: Negative. Respiratory: Negative. Cardiovascular: Negative. Gastrointestinal: Negative. Endocrine: Negative. Genitourinary: Negative. Musculoskeletal: Negative. Neurological: Positive for numbness. Negative for dizziness, syncope, speech difficulty, weakness and headaches. Psychiatric/Behavioral: Negative.     All other systems reviewed and are negative. Past Medical History:   has a past medical history of Hyperlipidemia, Hypertension, Meningitis, and Smoker. Past Surgical History:   has a past surgical history that includes hernia repair. Medications:  No current facility-administered medications on file prior to encounter. Current Outpatient Medications on File Prior to Encounter   Medication Sig Dispense Refill    NIFEdipine (ADALAT CC) 30 MG extended release tablet Take 1 tablet by mouth 2 times daily 180 tablet 1    aspirin 81 MG EC tablet Take 1 tablet by mouth daily 30 tablet 3    carvedilol (COREG) 12.5 MG tablet Take 1 tablet by mouth 2 times daily (with meals) 60 tablet 3    atorvastatin (LIPITOR) 20 MG tablet Take 1 tablet by mouth daily 30 tablet 3    losartan (COZAAR) 50 MG tablet Take 50 mg by mouth daily      albuterol sulfate HFA (VENTOLIN HFA) 108 (90 Base) MCG/ACT inhaler Inhale 2 puffs into the lungs every 6 hours as needed for Wheezing 1 Inhaler 2    Blood Pressure KIT Check BP twice a week/HTN 1 kit 0       Allergies: Allergies   Allergen Reactions    Hctz [Hydrochlorothiazide]      dehydration    Lisinopril Other (See Comments)     Cough          Social History:  Patient Lives    reports that he quit smoking about 3 years ago. His smoking use included cigarettes. He has a 15.00 pack-year smoking history. He has never used smokeless tobacco. He reports current alcohol use of about 6.0 standard drinks of alcohol per week. He reports that he does not use drugs. Family History:  family history includes Heart Disease in his father; Hypertension in his mother. ,    Physical Exam:  /70   Pulse 59   Temp 97.1 °F (36.2 °C) (Tympanic)   Resp 14   Ht 6' (1.829 m)   Wt 244 lb (110.7 kg)   SpO2 97%   BMI 33.09 kg/m²   Physical Exam  Vitals and nursing note reviewed. Constitutional:       General: He is not in acute distress. Appearance: Normal appearance. He is not ill-appearing.    HENT: Head: Normocephalic. Ears:      Comments: Left ear large amount of cerumen, unable to see canal or TM     Nose: Nose normal.      Mouth/Throat:      Mouth: Mucous membranes are moist.   Eyes:      Pupils: Pupils are equal, round, and reactive to light. Cardiovascular:      Rate and Rhythm: Normal rate and regular rhythm. Pulses: Normal pulses. Heart sounds: Normal heart sounds. Pulmonary:      Effort: Pulmonary effort is normal. No respiratory distress. Breath sounds: Normal breath sounds. No wheezing. Abdominal:      General: Abdomen is flat. Bowel sounds are normal. There is no distension. Palpations: Abdomen is soft. Tenderness: There is no abdominal tenderness. Musculoskeletal:         General: Normal range of motion. Cervical back: Normal range of motion. Right lower leg: No edema. Left lower leg: No edema. Skin:     General: Skin is warm and dry. Capillary Refill: Capillary refill takes less than 2 seconds. Neurological:      General: No focal deficit present. Mental Status: He is alert and oriented to person, place, and time. Motor: No weakness. Coordination: Coordination normal.   Psychiatric:         Mood and Affect: Mood normal.         Behavior: Behavior normal.         Thought Content:  Thought content normal.         Judgment: Judgment normal.         Labs:  CBC:   Lab Results   Component Value Date    WBC 8.2 08/30/2021    RBC 4.79 08/30/2021    HGB 15.0 08/30/2021    HCT 42.9 08/30/2021    MCV 89.5 08/30/2021    MCH 31.2 08/30/2021    MCHC 34.9 08/30/2021    RDW 14.1 08/30/2021     08/30/2021    MPV 7.1 08/30/2021     BMP:    Lab Results   Component Value Date     08/30/2021    K 4.7 08/30/2021     08/30/2021    CO2 24 08/30/2021    BUN 21 08/30/2021    CREATININE 1.3 08/30/2021    CALCIUM 9.7 08/30/2021    GFRAA >60 08/30/2021    GFRAA >60 06/17/2012    LABGLOM 57 08/30/2021    GLUCOSE 105 08/30/2021 CTA HEAD W CONTRAST   Final Result   No flow limiting stenosis or large vessel occlusion detected within the head   or neck. CTA NECK W CONTRAST   Final Result   No flow limiting stenosis or large vessel occlusion detected within the head   or neck. CT HEAD WO CONTRAST   Final Result   No acute intracranial abnormality. Chest Xray:   EKG:    I visualized CXR images and EKG strips    Problem List  Active Problems:    Paresthesia  Resolved Problems:    * No resolved hospital problems. *        Assessment/Plan:   1. Left Arm/Face Paresthesias TIA vs CVA  Patient will be admitted to medical floor with telemetry. Neurochecks every 4 hours overnight. Reviewed CT CTA of his head and neck. We will check lipid profile and hemoglobin A1c in the morning. Consider  increasing home dose of statin to high dose. MRI brain in the morning. Check B 12 and folate   Continue daily aspirin. Neurology consult pending MRI results. 2. Labile Hypertension  BP elevated on admission. He received his home medications in ER. Will continue to monitor. Continue to follow up with Nephrology      3. Bradycardia  Reviewed EKG, no block. Continue coreg with parameters    4. Obesity  Weight management    Patient is suppose to get outpatient sleep study. DVT prophylaxis Lovenox   Code status Full   Diet Cardiac   IV access peripheral   Evangelista Catheter none    Admit a Observation. I anticipate hospitalization spanning less than two midnights for investigation and treatment of the above medically necessary diagnoses. Please note that some part of this chart was generated using Dragon dictation software. Although every effort was made to ensure the accuracy of this automated transcription, some errors in transcription may have occurred inadvertently. If you may need any clarification, please do not hesitate to contact me through [facility].        DEEPTI Castillo CNP    8/30/2021 8:30 PM

## 2021-08-31 NOTE — ED NOTES
Report given to 3T RN.   Tele applied and visible per [facility], RN  08/30/21 1588

## 2021-08-31 NOTE — PROGRESS NOTES
Speech Language Pathology  Facility/Department: [address]  Initial Assessment  DYSPHAGIA BEDSIDE SWALLOW EVALUATION     Patient: Edgar Romero   : 1965   MRN: [id number]      Evaluation Date: 2021   Admitting Diagnosis: TIA (transient ischemic attack) [G45.9]  Paresthesia [R20.2]  Pain: Pt denies pain at this time                         H&P: Edgar Romero is a 60 y.o. male who presents to the emergency department for evaluation of intermittent left face and left arm numbness and tingling sensation. Patient was at a visit with his nephrologist immediately prior to arrival here. He was having a visit regarding his elevated blood pressure. He endorsed that he has been having intermittent left lip, left cheek and left upper extremity numbness and tingling since Saturday. He states that on Saturday, symptoms lasted almost the entire day before resolving. He also had an episode this morning lasting about an hour which he endorsed to his nephrologist.  His nephrologist wanted him sent to the emergency room for evaluation of stroke. The patient denies any known stroke history. He is only on baby aspirin therapy. Currently he is not having any symptoms. Head CT:   Impression   No acute intracranial abnormality. Recent MRI Brain: Pending    History/Prior Level of Function:   Living Status: Home    Prior Dysphagia History: Pt reports no difficulty with swallowing function at this time or prior to this admission    Dysphagia Impressions/Diagnosis: Oropharyngeal Dysphagia   Pt alert and verbally responsive. Pt reports all his admitting symptoms have now resolved. Pt demonstrates no overt facial asymmetry at rest or with completion of OME. With po trials, Pt demonstrates adequate bolus control, mastication and A-P propulsion with all textures.  Clinical symptoms of timely swallow initiation and adequate laryngeal excursion for airway protection and for pharyngeal clearing

## 2021-08-31 NOTE — PLAN OF CARE
Problem: Falls - Risk of:  Goal: Will remain free from falls  Description: Will remain free from falls  Outcome: Ongoing  Goal: Absence of physical injury  Description: Absence of physical injury  Outcome: Ongoing     Problem: Pain:  Goal: Patient's pain/discomfort is manageable  Description: Patient's pain/discomfort is manageable  Outcome: Ongoing     Problem: Skin Integrity:  Goal: Skin integrity will stabilize  Description: Skin integrity will stabilize  Outcome: Ongoing   Pt remains free from falls and physical injury. Pt has denied pain at this time. Pt shows no signs of skin breakdown and is able to ambulate independently. See flowsheet for assessment.

## 2021-09-01 VITALS
HEIGHT: 72 IN | HEART RATE: 54 BPM | WEIGHT: 244 LBS | DIASTOLIC BLOOD PRESSURE: 84 MMHG | SYSTOLIC BLOOD PRESSURE: 166 MMHG | TEMPERATURE: 98.2 F | RESPIRATION RATE: 18 BRPM | BODY MASS INDEX: 33.05 KG/M2 | OXYGEN SATURATION: 95 %

## 2021-09-01 PROCEDURE — 6370000000 HC RX 637 (ALT 250 FOR IP): Performed by: NURSE PRACTITIONER

## 2021-09-01 PROCEDURE — G0378 HOSPITAL OBSERVATION PER HR: HCPCS

## 2021-09-01 PROCEDURE — 97161 PT EVAL LOW COMPLEX 20 MIN: CPT

## 2021-09-01 PROCEDURE — 6360000002 HC RX W HCPCS: Performed by: NURSE PRACTITIONER

## 2021-09-01 PROCEDURE — 99222 1ST HOSP IP/OBS MODERATE 55: CPT | Performed by: INTERNAL MEDICINE

## 2021-09-01 RX ORDER — CARVEDILOL 6.25 MG/1
6.25 TABLET ORAL 2 TIMES DAILY WITH MEALS
Qty: 60 TABLET | Refills: 3 | Status: SHIPPED | OUTPATIENT
Start: 2021-09-01 | End: 2021-11-22 | Stop reason: SDUPTHER

## 2021-09-01 RX ORDER — LOSARTAN POTASSIUM 100 MG/1
100 TABLET ORAL DAILY
Qty: 30 TABLET | Refills: 3 | Status: SHIPPED | OUTPATIENT
Start: 2021-09-01 | End: 2022-07-18

## 2021-09-01 RX ADMIN — ATORVASTATIN CALCIUM 20 MG: 20 TABLET, FILM COATED ORAL at 08:51

## 2021-09-01 RX ADMIN — LOSARTAN POTASSIUM 50 MG: 25 TABLET, FILM COATED ORAL at 08:51

## 2021-09-01 RX ADMIN — NIFEDIPINE 30 MG: 30 TABLET, FILM COATED, EXTENDED RELEASE ORAL at 08:51

## 2021-09-01 RX ADMIN — ASPIRIN 81 MG: 81 TABLET, COATED ORAL at 08:52

## 2021-09-01 RX ADMIN — CARVEDILOL 12.5 MG: 6.25 TABLET, FILM COATED ORAL at 08:52

## 2021-09-01 ASSESSMENT — PAIN SCALES - GENERAL
PAINLEVEL_OUTOF10: 0

## 2021-09-01 NOTE — PROGRESS NOTES
Patient received his discharge instructions, he know what to look for, the signs and symptoms of a stroke to be aware of, he is going to keep his sleep sleep clinic appointment. He thanked us for his care, and he walked out to the front.

## 2021-09-01 NOTE — PROGRESS NOTES
Physical Therapy    Facility/Department: [address]  Initial Assessment/Discharge Summary    NAME: Eva Fuentes  : 1965  MRN: [id number]    Date of Service: 2021    Discharge Recommendations: Eva Fuentes scored a 24/24 on the AM-PAC short mobility form. At this time, no further PT is recommended upon discharge due to pt's functional mobility being at baseline levels. Recommend patient returns to prior setting with prior services. PT Equipment Recommendations  Equipment Needed: No    Assessment   Assessment: pt is moving at his baseline ability. Independent with all activities during evaluation  Prognosis: Excellent  Decision Making: Low Complexity  Clinical Presentation: stable/ uncomplicated  PT Education: PT Role;Plan of Care  Patient Education: pt verbalized understanding  Barriers to Learning: none  REQUIRES PT FOLLOW UP: No  Activity Tolerance  Activity Tolerance: Patient Tolerated treatment well  Activity Tolerance: no limitations during PT eval       Patient Diagnosis(es): The encounter diagnosis was TIA (transient ischemic attack). has a past medical history of Hyperlipidemia, Hypertension, Meningitis, and Smoker. has a past surgical history that includes hernia repair.     Restrictions  Restrictions/Precautions  Required Braces or Orthoses?: No     Vision/Hearing  Vision: Impaired  Vision Exceptions: Wears glasses for distance  Hearing: Within functional limits       Subjective  General  Chart Reviewed: Yes  Patient assessed for rehabilitation services?: Yes  Response To Previous Treatment: Not applicable  Family / Caregiver Present: No  Diagnosis: TIA  Follows Commands: Within Functional Limits  Subjective  Subjective: pt supine in bed on arrival. consented to PT eval  Pain Screening  Patient Currently in Pain: Denies  Vital Signs  Patient Currently in Pain: Denies       Orientation  Orientation  Overall Orientation Status: Within Normal Limits Social/Functional History  Social/Functional History  Lives With: Spouse  Type of Home: House  Home Layout: One level (basement)  Home Access: Stairs to enter without rails  Entrance Stairs - Number of Steps: 1  Bathroom Shower/Tub: Walk-in shower, Tub/Shower unit  Bathroom Toilet: Standard  ADL Assistance: Independent  Homemaking Assistance: Independent  Homemaking Responsibilities: Yes  Ambulation Assistance: Independent  Transfer Assistance: Independent  Active : Yes  Occupation: Full time employment  Type of occupation: load captain  Leisure & Hobbies: gym, lawn care  Additional Comments: no falls in last 3-6 months     Cognition   Cognition  Overall Cognitive Status: WNL    Objective     Observation/Palpation  Posture: Good    PROM RLE (degrees)  RLE PROM: WFL  AROM RLE (degrees)  RLE AROM: WFL  PROM LLE (degrees)  LLE PROM: WFL  AROM LLE (degrees)  LLE AROM : WFL  Strength RLE  Strength RLE: WNL  Strength LLE  Strength LLE: WNL  Tone RLE  RLE Tone: Normotonic  Tone LLE  LLE Tone: Normotonic  Motor Control  Gross Motor?: WNL  Sensation  Overall Sensation Status: WNL  Bed mobility  Supine to Sit: Independent  Transfers  Sit to Stand: Independent  Stand to sit:  Independent  Ambulation  Ambulation?: Yes  Ambulation 1  Surface: level tile  Device: No Device  Assistance: Independent  Gait Deviations: None  Distance: 61'  Comments: pt walking with no deviations, fast pace  Stairs/Curb  Stairs?: No   Pt refusing to ambulate in hallway due to \"all the people out there\"  Balance  Posture: Good  Sitting - Static: Good  Sitting - Dynamic: Good  Standing - Static: Good  Standing - Dynamic: Good        Plan   Safety Devices  Type of devices: Nurse notified, Left in bed, call light within reach  Restraints  Initially in place: No    G-Code       OutComes Score    AM-PAC Score  AM-PAC Inpatient Mobility Raw Score : 24 (09/01/21 1033)  AM-PAC Inpatient T-Scale Score : 61.14 (09/01/21 1033)  Mobility Inpatient CMS 0-100% Score: 0 (09/01/21 1033)  Mobility Inpatient CMS G-Code Modifier : [facility] (09/01/21 1033)          Goals  Short term goals  Time Frame for Short term goals: d/c from PT care       Therapy Time   Individual Concurrent Group Co-treatment   Time In 1014         Time Out 1027         Minutes 13              Timed Code Treatment Minutes:   0    Total Treatment Minutes:  [address], [facility]    PT providing direct supervision during session and assisting in making skilled judgements throughout session.   [id number] [facility] PT, DPT [id number]    [address] [place] [address], PT

## 2021-09-01 NOTE — PROGRESS NOTES
Patient is concerned that he keeps having symptoms. He is going to follow up with  in one week and is going to do an outpatient sleep study. I encourage him to follow a low sodium diet, followup with the doctor and avoid fatty foods. Patient is very concerned.

## 2021-09-01 NOTE — CONSULTS
Office : [phone]     Fax :[phone]       Nephrology Consult Note      Patient's Name: Kimberly Pope  8:55 AM  9/1/2021    Reason for Consult:  Labile HTN       Requesting Physician:  César Boyce MD      Chief Complaint:    Left arm and face numbness     History of Present Ilness:    Kimberly Pope is a 60 y.o. male with hypertension, dyslipidemia who was admitted with complaint of left arm numbness and left facial numbness. He has history of labile hypertension but mostly at this time blood pressure is under control. He was noted to have bradycardia heart rate as low as 49  Denies any headaches. MRI brain is negative for any lesion/infarct      I/O last 3 completed shifts: In: 12 [P.O.:960]  Out: -     Past Medical History:   Diagnosis Date    Hyperlipidemia     Hypertension     Meningitis     02/2007    Smoker        Past Surgical History:   Procedure Laterality Date    HERNIA REPAIR      inguinal hernia , right, age 9       Family History   Problem Relation Age of Onset    Heart Disease Father     Hypertension Mother         reports that he quit smoking about 3 years ago. His smoking use included cigarettes. He has a 15.00 pack-year smoking history. He has never used smokeless tobacco. He reports current alcohol use of about 6.0 standard drinks of alcohol per week. He reports that he does not use drugs.         Allergies:  Hctz [hydrochlorothiazide] and Lisinopril    Current Medications:    perflutren lipid microspheres (DEFINITY) injection 1.65 mg, ONCE PRN  aspirin EC tablet 81 mg, Daily  albuterol sulfate  (90 Base) MCG/ACT inhaler 2 puff, Q6H PRN  atorvastatin (LIPITOR) tablet 20 mg, Daily  losartan (COZAAR) tablet 50 mg, Daily  carvedilol (COREG) tablet 12.5 mg, BID WC  NIFEdipine (PROCARDIA XL) extended release tablet 30 mg, BID  sodium chloride flush 0.9 % injection 5-40 mL, 2 times per day  sodium chloride flush 0.9 % injection 5-40 mL, PRN  0.9 % sodium chloride infusion, PRN  enoxaparin (LOVENOX) injection 40 mg, Daily  ondansetron (ZOFRAN-ODT) disintegrating tablet 4 mg, Q8H PRN   Or  ondansetron (ZOFRAN) injection 4 mg, Q6H PRN  polyethylene glycol (GLYCOLAX) packet 17 g, Daily PRN  acetaminophen (TYLENOL) tablet 650 mg, Q6H PRN   Or  acetaminophen (TYLENOL) suppository 650 mg, Q6H PRN        Review of Systems:   14 point ROS obtained but were negative except mentioned in HPI      Physical exam:     Vitals:  BP (!) 166/84   Pulse 54   Temp 98.2 °F (36.8 °C) (Oral)   Resp 18   Ht 6' (1.829 m)   Wt 244 lb (110.7 kg)   SpO2 95%   BMI 33.09 kg/m²   Constitutional:  OAA X3 NAD  Skin: no rash, turgor wnl  Heent:  eomi, mmm  Neck: no bruits or jvd noted  Cardiovascular:  S1, S2 without m/r/g  Respiratory: CTA B without w/r/r  Abdomen:  +bs, soft, nt, nd  Ext: No lower extremity edema  Psychiatric: mood and affect appropriate  Musculoskeletal:  Rom, muscular strength intact    Labs:  CBC:   Recent Labs     08/30/21  1440 08/31/21  0450   WBC 8.2 7.0   HGB 15.0 14.8    234     BMP:    Recent Labs     08/30/21  1440 08/31/21  0450    139   K 4.7 4.1    104   CO2 24 25   BUN 21* 20   CREATININE 1.3 1.2   GLUCOSE 105* 96     Ca/Mg/Phos:   Recent Labs     08/30/21  1440 08/31/21  0450   CALCIUM 9.7 9.8     Hepatic:   Recent Labs     08/30/21  1440 08/31/21  0450   AST 18 17   ALT 24 23   BILITOT 0.6 0.5   ALKPHOS 95 78     Troponin:   Recent Labs     08/30/21  1440   TROPONINI <0.01     BNP: No results for input(s): BNP in the last 72 hours. Lipids:   Recent Labs     08/31/21  0450   CHOL 191   TRIG 261*   HDL 32*   LDLCALC 107*   LABVLDL 52     ABGs: No results for input(s): PHART, PO2ART, NJX4LLQ in the last 72 hours.   INR: Recent Labs     08/30/21  1440   INR 0.96     UA:No results for input(s): Geri Harp, GLUCOSEU, BILIRUBINUR, Talib Polka, BLOODU, PHUR, PROTEINU, UROBILINOGEN, NITRU, LEUKOCYTESUR, Vilinda Muck in the last 72 hours. Urine Microscopic: No results for input(s): LABCAST, BACTERIA, COMU, HYALCAST, WBCUA, RBCUA, EPIU in the last 72 hours. Urine Culture: No results for input(s): LABURIN in the last 72 hours. Urine Chemistry: No results for input(s): Lieutenant Brisker, PROTEINUR, NAUR in the last 72 hours. IMAGING:  MRI BRAIN WO CONTRAST   Final Result   1. No acute infarct or hemorrhage. 2. Other findings as described. CTA HEAD W CONTRAST   Final Result   No flow limiting stenosis or large vessel occlusion detected within the head   or neck. CTA NECK W CONTRAST   Final Result   No flow limiting stenosis or large vessel occlusion detected within the head   or neck. CT HEAD WO CONTRAST   Final Result   No acute intracranial abnormality. Assessment/Plan :      1.  HTN. Blood pressure fairly controlled. Heart rate dropping into the 40s. We'll decrease the Coreg to 6.25 mg p.o. twice daily. Increase losartan to 100 mg p.o. daily. Continue [place] XL 30 mg p.o. twice daily      2. Paresthesias. Left arm numbness and left facial numbness is resolved.   Management per primary team    D/w primary team      Thank you for allowing us to participate in care of [facility]         Electronically signed by: Valerie Youssef MD, 9/1/2021, 8:55 AM      [facility] of [address] [place] [address]  Office : [phone]  Fax :[phone]

## 2021-09-01 NOTE — PLAN OF CARE
Reassessment complete. BP elevated but all other vital signs stable at this time. Telemetry on. Pt alert and oriented x4. Pt denies any further needs at this time. Call light within reach.    Vitals:    09/01/21 0025   BP: (!) 148/72   Pulse: 50   Resp: 18   Temp: 97.6 °F (36.4 °C)   SpO2: 95%       Problem: Falls - Risk of:  Goal: Will remain free from falls  Description: Will remain free from falls  9/1/2021 0102 by Rick Martin RN  Outcome: Ongoing     Problem: Pain:  Goal: Patient's pain/discomfort is manageable  Description: Patient's pain/discomfort is manageable  9/1/2021 0102 by Rick Martin RN  Outcome: Ongoing     Problem: Skin Integrity:  Goal: Skin integrity will stabilize  Description: Skin integrity will stabilize  9/1/2021 0102 by Rick Martin RN  Outcome: Ongoing

## 2021-09-01 NOTE — DISCHARGE SUMMARY
Hospital Medicine Discharge Summary    Patient: Herve Watkins     Gender: male  : 1965   Age: 60 y.o. MRN: [id number]    Admitting Physician: Alessandro Goff MD  Discharge Physician: Bryce Ji MD     Code Status: Full Code   Admit Date: 2021   Discharge Date:   2021    Disposition:  Home    Discharge Diagnoses: Active Hospital Problems    Diagnosis Date Noted    Paresthesia [R20.2] 2021       Follow-up appointments:  one week with Dr. Vicki Sauer     Outpatient to do list: outpatient sleep study    Condition at Discharge:  [facility] Course:   60 y. o. male with history of hypertension, hyperlipidemia, and obesity.  Patient presents the emergency room for left arm and face paresthesias.  He reports he was having symptoms all day on Saturday and again on  morning.  Denies any slurred speech or weakness.  He denies any headache.  This morning his systolic blood pressure was 180 prior to going to work. Patient does state that he knows when his blood pressure is elevated. On Saturday however when he was having the paresthesias symptoms he reports his blood pressure systolics 263C.     He did see Dr. Schmitt earlier today and was referred to the emergency room due to his symptoms.  Currently he states the numbness has improved. Brandon Sheikh was given aspirin in the emergency room.     He has been following with Dr. Vicki Sauer Nephrology for labile HTN. Renin and aldosterone levels were normal. Renal doppler showed no artery stenosis. Work up in house was negative including negative CTA head and neck, Negative MRI, normal echo with a negative bubble study. He is currently asymptomatic. He will follow up with Dr. Vicki Sauer for his HTN. I suspect his HTN was the cause of this symptoms. I have also set him up for a home sleep study as he is not scheduled until October for a lab sleep study.   He is stable for d.c home   We have increased his Cozaar dose and decreased his Coreg dose.    Discharge Medications:   Current Discharge Medication List        Current Discharge Medication List      CONTINUE these medications which have CHANGED    Details   carvedilol (COREG) 6.25 MG tablet Take 1 tablet by mouth 2 times daily (with meals)  Qty: 60 tablet, Refills: 3      losartan (COZAAR) 100 MG tablet Take 1 tablet by mouth daily  Qty: 30 tablet, Refills: 3           Current Discharge Medication List      CONTINUE these medications which have NOT CHANGED    Details   NIFEdipine (ADALAT CC) 30 MG extended release tablet Take 1 tablet by mouth 2 times daily  Qty: 180 tablet, Refills: 1      aspirin 81 MG EC tablet Take 1 tablet by mouth daily  Qty: 30 tablet, Refills: 3      atorvastatin (LIPITOR) 20 MG tablet Take 1 tablet by mouth daily  Qty: 30 tablet, Refills: 3      albuterol sulfate HFA (VENTOLIN HFA) 108 (90 Base) MCG/ACT inhaler Inhale 2 puffs into the lungs every 6 hours as needed for Wheezing  Qty: 1 Inhaler, Refills: 2      Blood Pressure KIT Check BP twice a week/HTN  Qty: 1 kit, Refills: 0           Current Discharge Medication List          Discharge ROS:  A complete review of systems was asked and negative except for wanting to go home    Discharge Exam:    BP (!) 166/84   Pulse 54   Temp 98.2 °F (36.8 °C) (Oral)   Resp 18   Ht 6' (1.829 m)   Wt 244 lb (110.7 kg)   SpO2 95%   BMI 33.09 kg/m²   General appearance:  NAD  HEENT:   Normal cephalic, atraumatic, moist mucous membranes, no oropharyngeal erythema or exudate  Neck: Supple, trachea midline, no anterior cervical or SC LAD  Heart[de-identified] Normal s1/s2, RRR, no murmurs, gallops, or rubs. No  leg edema  Lungs: Clear to auscultation, bilaterally without Rales/Wheezes/Rhonchi. Abdomen: Soft, non-tender, non-distended, bowel sounds present, no masses  Musculoskeletal:  No clubbing, no cyanosis, no edema  Skin: No lesion or masses  Neurologic:  Neurovascularly intact without any focal sensory/motor deficits.  Cranial nerves: II-XII intact, grossly non-focal.  Psychiatric:  A & O x3  Neuro: Grossly intact, moves all four extremities     Labs: For convenience and continuity at follow-up the following most recent labs are provided:    Lab Results   Component Value Date    WBC 7.0 08/31/2021    HGB 14.8 08/31/2021    HCT 42.8 08/31/2021    MCV 89.6 08/31/2021     08/31/2021     08/31/2021    K 4.1 08/31/2021     08/31/2021    CO2 25 08/31/2021    BUN 20 08/31/2021    CREATININE 1.2 08/31/2021    CALCIUM 9.8 08/31/2021    ALKPHOS 78 08/31/2021    ALT 23 08/31/2021    AST 17 08/31/2021    BILITOT 0.5 08/31/2021    LABALBU 4.4 08/31/2021    LDLCALC 107 08/31/2021    TRIG 261 08/31/2021     Lab Results   Component Value Date    INR 0.96 08/30/2021    INR 0.96 02/20/2017    INR 0.89 03/11/2015       Radiology:  Echo Complete    Result Date: 8/31/2021  Transthoracic Echocardiography Report (TTE)  Demographics   Patient Name        Aleksandra Lagos   Date of Study       08/31/2021  Gender                 Male   Patient Number      [id number]  Date of Birth          1965   Visit Number        [id number]   Age                    60 year(s)   Accession Number    [id number]  Room Number            8585   Corporate ID        [id number]    Sonographer            Jose Verduzco T   Ordering Physician              Interpreting Physician Sehr Gandara MD, [address] [facility]  Procedure Type of Study   TTE procedure:ECHOCARDIOGRAM COMPLETE WITH BUBBLE STUDY. Procedure Date Date: 08/31/2021 Start: 02:52 PM Study Location: [facility] - Echo Lab Technical Quality: Adequate visualization Indications:CVA. Patient Status: Routine Contrast Medium: Bubble Study. Height: 72 inches Weight: 244.01 pounds BSA: 2.32 m2 BMI: 33.09 kg/m2 BP: 118/78 mmHg  Conclusions   Summary  A bubble study was performed and fails to show evidence of shunting.   Left ventricular systolic function is normal with ejection fraction  estimated at 55-60 %. No regional wall motion abnormalities are noted. Normal left ventricular wall thickness. Normal left ventricular diastolic filling pressure. Mild tricuspid regurgitation. Systolic pulmonary artery pressure (SPAP) is normal and estimated at 27 mmHg  (right atrial pressure 8 mmHg). Previous echo done 8/2020 - EF 50%   Signature   ------------------------------------------------------------------  Electronically signed by Lex Huerta MD, [facility] (Interpreting  physician) on 08/31/2021 at 04:45 PM  ------------------------------------------------------------------   Findings   Left Ventricle  Left ventricular systolic function is normal with ejection fraction  estimated at 55-60 %. No regional wall motion abnormalities are noted. Normal left ventricular wall thickness. Left ventricle size is normal.  Normal left ventricular diastolic filling pressure. Mitral Valve  Mitral valve is structurally normal.  No evidence of mitral regurgitation. Left Atrium  The left atrium is normal in size. A bubble study was performed and fails to show evidence of shunting. Aortic Valve  The aortic valve is normal in structure and function. No evidence of aortic valve regurgitation. Aorta  The aortic root is normal in size. Right Ventricle  The right ventricle is mildly enlarged. Tricuspid Valve  Tricuspid valve is structurally normal.  Mild tricuspid regurgitation. Systolic pulmonary artery pressure (SPAP) is normal and estimated at 27 mmHg  (right atrial pressure 8 mmHg). Right Atrium  The right atrium is mildly dilated. Pulmonic Valve  The pulmonic valve is normal in structure and function. No evidence of pulmonic valve regurgitation. Pericardial Effusion  No pericardial effusion noted. Pleural Effusion  No pleural effusion noted. Miscellaneous  IVC not well visualized.   M-Mode/2D Measurements (cm)   LV Diastolic Dimension: 7.26 cm LV Systolic Dimension: 7.54 cm LV Septum Diastolic: 8.79 cm  LV PW Diastolic: 6.49 cm        AO Root Dimension: 3.5 cm                                   LA Area: 19.2 cm2  LVOT: 2.1 cm                    LA volume/Index: 42.8 ml /18 ml/m2  Doppler Measurements   AV Peak Velocity: 156 cm/s     MV Peak E-Wave: 88.7 cm/s  AV Peak Gradient: 9.73 mmHg    MV Peak A-Wave: 48.4 cm/s  AV Mean Gradient: 5 mmHg       MV E/A Ratio: 1.83  LVOT Peak Velocity: 116 cm/s  AV Area (Continuity):2.67 cm2   TR Velocity:220 cm/s  TR Gradient:19.36 mmHg  Estimated RAP:8 mmHg  Estimated RVSP: 27 mmHg  E' Septal Velocity: 9.36 cm/s  E' Lateral Velocity: 13.1 cm/s  E/E' ratio: 8.2   Aortic Valve   Peak Velocity: 156 cm/s     Mean Velocity: 104 cm/s  Peak Gradient: 9.73 mmHg    Mean Gradient: 5 mmHg  Area (continuity): 2.67 cm2  AV VTI: 42.6 cm  Aorta   Aortic Root: 3.5 cm     Aortic Arch: 3.2 cm  Ascending Aorta: 3.3 cm  LVOT Diameter: 2.1 cm      CT HEAD WO CONTRAST    Result Date: 8/30/2021  EXAMINATION: CT OF THE HEAD WITHOUT CONTRAST  8/30/2021 3:46 pm TECHNIQUE: CT of the head was performed without the administration of intravenous contrast. Dose modulation, iterative reconstruction, and/or weight based adjustment of the mA/kV was utilized to reduce the radiation dose to as low as reasonably achievable. COMPARISON: 02/20/2017. HISTORY: ORDERING SYSTEM PROVIDED HISTORY: L sided numbness TECHNOLOGIST PROVIDED HISTORY: Has a \"code stroke\" or \"stroke alert\" been called? ->No Reason for exam:->L sided numbness Decision Support Exception - unselect if not a suspected or confirmed emergency medical condition->Emergency Medical Condition (MA) Reason for Exam: left side weakness Acuity: Acute Type of Exam: Initial FINDINGS: BRAIN/VENTRICLES: There is no acute intracranial hemorrhage, mass effect or midline shift. No abnormal extra-axial fluid collection. The gray-white differentiation is maintained without evidence of an acute infarct. There is no evidence of hydrocephalus. ORBITS: The visualized portion of the orbits demonstrate no acute abnormality. SINUSES: Mucosal thickening in the base of the maxillary sinuses bilaterally. The remainder the visualized paranasal sinuses and mastoid air cells are clear. SOFT TISSUES/SKULL:  No acute abnormality of the visualized skull or soft tissues. No acute intracranial abnormality. CTA NECK W CONTRAST    Result Date: 8/30/2021  EXAMINATION: CTA OF THE NECK; CTA OF THE HEAD WITH CONTRAST 8/30/2021 3:46 pm: TECHNIQUE: CTA of the neck was performed with the administration of intravenous contrast. Multiplanar reformatted images are provided for review. MIP images are provided for review. Stenosis of the internal carotid arteries measured using NASCET criteria. Dose modulation, iterative reconstruction, and/or weight based adjustment of the mA/kV was utilized to reduce the radiation dose to as low as reasonably achievable.; CTA of the head/brain was performed with the administration of intravenous contrast. Multiplanar reformatted images are provided for review. MIP images are provided for review. Dose modulation, iterative reconstruction, and/or weight based adjustment of the mA/kV was utilized to reduce the radiation dose to as low as reasonably achievable. COMPARISON: None. HISTORY: ORDERING SYSTEM PROVIDED HISTORY: L sided numbness TECHNOLOGIST PROVIDED HISTORY: Has a \"code stroke\" or \"stroke alert\" been called? ->No Reason for exam:->L sided numbness Decision Support Exception - unselect if not a suspected or confirmed emergency medical condition->Emergency Medical Condition (MA) Reason for Exam: left side weakness Acuity: Acute Type of Exam: Initial FINDINGS: CTA NECK: AORTIC ARCH/ARCH VESSELS: No dissection or arterial injury. No significant stenosis of the brachiocephalic or subclavian arteries. CAROTID ARTERIES: No dissection, arterial injury, or hemodynamically significant stenosis by NASCET criteria.  VERTEBRAL ARTERIES: No dissection, arterial injury, or significant stenosis. SOFT TISSUES: The lung apices are clear. No cervical or superior mediastinal lymphadenopathy. The larynx and pharynx are unremarkable. No acute abnormality of the salivary and thyroid glands. BONES: No acute osseous abnormality. CTA HEAD: ANTERIOR CIRCULATION: No significant stenosis of the intracranial internal carotid, anterior cerebral, or middle cerebral arteries. No aneurysm. POSTERIOR CIRCULATION: No significant stenosis of the vertebral, basilar, or posterior cerebral arteries. No aneurysm. OTHER: No dural venous sinus thrombosis on this non-dedicated study. BRAIN: See separately dictated noncontrast head CT report. No flow limiting stenosis or large vessel occlusion detected within the head or neck. CTA HEAD W CONTRAST    Result Date: 8/30/2021  EXAMINATION: CTA OF THE NECK; CTA OF THE HEAD WITH CONTRAST 8/30/2021 3:46 pm: TECHNIQUE: CTA of the neck was performed with the administration of intravenous contrast. Multiplanar reformatted images are provided for review. MIP images are provided for review. Stenosis of the internal carotid arteries measured using NASCET criteria. Dose modulation, iterative reconstruction, and/or weight based adjustment of the mA/kV was utilized to reduce the radiation dose to as low as reasonably achievable.; CTA of the head/brain was performed with the administration of intravenous contrast. Multiplanar reformatted images are provided for review. MIP images are provided for review. Dose modulation, iterative reconstruction, and/or weight based adjustment of the mA/kV was utilized to reduce the radiation dose to as low as reasonably achievable. COMPARISON: None. HISTORY: ORDERING SYSTEM PROVIDED HISTORY: L sided numbness TECHNOLOGIST PROVIDED HISTORY: Has a \"code stroke\" or \"stroke alert\" been called? ->No Reason for exam:->L sided numbness Decision Support Exception - unselect if not a suspected or confirmed emergency medical condition->Emergency Medical Condition (MA) Reason for Exam: left side weakness Acuity: Acute Type of Exam: Initial FINDINGS: CTA NECK: AORTIC ARCH/ARCH VESSELS: No dissection or arterial injury. No significant stenosis of the brachiocephalic or subclavian arteries. CAROTID ARTERIES: No dissection, arterial injury, or hemodynamically significant stenosis by NASCET criteria. VERTEBRAL ARTERIES: No dissection, arterial injury, or significant stenosis. SOFT TISSUES: The lung apices are clear. No cervical or superior mediastinal lymphadenopathy. The larynx and pharynx are unremarkable. No acute abnormality of the salivary and thyroid glands. BONES: No acute osseous abnormality. CTA HEAD: ANTERIOR CIRCULATION: No significant stenosis of the intracranial internal carotid, anterior cerebral, or middle cerebral arteries. No aneurysm. POSTERIOR CIRCULATION: No significant stenosis of the vertebral, basilar, or posterior cerebral arteries. No aneurysm. OTHER: No dural venous sinus thrombosis on this non-dedicated study. BRAIN: See separately dictated noncontrast head CT report. No flow limiting stenosis or large vessel occlusion detected within the head or neck. MRI BRAIN WO CONTRAST    Result Date: 8/31/2021  EXAMINATION: MRI OF THE BRAIN WITHOUT CONTRAST  8/31/2021 5:47 pm TECHNIQUE: Multiplanar multisequence MRI of the brain was performed without the administration of intravenous contrast. COMPARISON: CTA head/neck and CT head 08/30/2021. MR brain 02/14/2007. HISTORY: ORDERING SYSTEM PROVIDED HISTORY: Paresthesias left arm and face, rule out CVA TECHNOLOGIST PROVIDED HISTORY: Reason for exam:->Paresthesias left arm and face, rule out CVA FINDINGS: INTRACRANIAL STRUCTURES/VENTRICLES:  No restricted diffusion. No acute hemorrhage. Few periventricular and subcortical foci of T2 prolongation are nonspecific and may be related to microvascular disease.  Pituitary gland is within normal limits in size. No cerebellar tonsillar herniation. No midline shift. Ventricles are within normal limits in size. Basal cisterns appear patent. ORBITS: Visualized orbits demonstrate no acute process. SINUSES: Mild mucosal thickening of the ethmoid sinuses. Moderate mucosal thickening of the maxillary sinuses. Visualized mastoid air cells appear clear. BONES/SOFT TISSUES: The bones demonstrate no acute abnormality given limitations of modality. Soft tissues demonstrate no acute process. 1. No acute infarct or hemorrhage. 2. Other findings as described. EKG     Sinus bradycardiaOtherwise normal ECG        The patient was seen and examined on day of discharge and this discharge summary is in conjunction with any daily progress note from day of discharge. Time Spent on discharge is 35 minutes  in the examination, evaluation, counseling and review of medications and discharge plan. Note that greater  than 30 minutes was spent in preparing discharge papers, discussing discharge with patient, medication review, etc.       Signed:    Frankie Lopez MD   9/1/2021      Thank you Tano Good MD for the opportunity to be involved in this patient's care.  If you have any questions or concerns please feel free to contact me at [phone]

## 2021-09-03 ENCOUNTER — TELEPHONE (OUTPATIENT)
Dept: FAMILY MEDICINE CLINIC | Age: 56
End: 2021-09-03

## 2021-09-03 NOTE — TELEPHONE ENCOUNTER
Mary[facility] Transitions Initial Follow Up Call    Outreach made within 2 business days of discharge: Yes    Patient: Sharon Martinez Patient : 1965   MRN: [id number]  Reason for Admission: There are no discharge diagnoses documented for the most recent discharge.   Discharge Date: 21       Spoke with: geovanni GARZA for patient to call and schedule hospital follow up, please schedule and transfer call to [place] to do the TCMOFFICE question

## 2021-10-18 ENCOUNTER — OFFICE VISIT (OUTPATIENT)
Dept: PULMONOLOGY | Age: 56
End: 2021-10-18
Payer: COMMERCIAL

## 2021-10-18 VITALS
HEART RATE: 60 BPM | WEIGHT: 244 LBS | HEIGHT: 72 IN | OXYGEN SATURATION: 96 % | SYSTOLIC BLOOD PRESSURE: 130 MMHG | BODY MASS INDEX: 33.05 KG/M2 | DIASTOLIC BLOOD PRESSURE: 74 MMHG

## 2021-10-18 DIAGNOSIS — R06.83 SNORING: ICD-10-CM

## 2021-10-18 DIAGNOSIS — G47.10 HYPERSOMNIA: Primary | ICD-10-CM

## 2021-10-18 DIAGNOSIS — I10 ESSENTIAL HYPERTENSION: Chronic | ICD-10-CM

## 2021-10-18 DIAGNOSIS — E66.9 NON MORBID OBESITY, UNSPECIFIED OBESITY TYPE: Chronic | ICD-10-CM

## 2021-10-18 PROCEDURE — G8484 FLU IMMUNIZE NO ADMIN: HCPCS | Performed by: INTERNAL MEDICINE

## 2021-10-18 PROCEDURE — G8427 DOCREV CUR MEDS BY ELIG CLIN: HCPCS | Performed by: INTERNAL MEDICINE

## 2021-10-18 PROCEDURE — 99214 OFFICE O/P EST MOD 30 MIN: CPT | Performed by: INTERNAL MEDICINE

## 2021-10-18 PROCEDURE — G8417 CALC BMI ABV UP PARAM F/U: HCPCS | Performed by: INTERNAL MEDICINE

## 2021-10-18 ASSESSMENT — SLEEP AND FATIGUE QUESTIONNAIRES
HOW LIKELY ARE YOU TO NOD OFF OR FALL ASLEEP WHILE LYING DOWN TO REST IN THE AFTERNOON WHEN CIRCUMSTANCES PERMIT: 2
HOW LIKELY ARE YOU TO NOD OFF OR FALL ASLEEP WHILE SITTING AND TALKING TO SOMEONE: 0
ESS TOTAL SCORE: 4
NECK CIRCUMFERENCE (INCHES): 17.5
HOW LIKELY ARE YOU TO NOD OFF OR FALL ASLEEP WHILE SITTING AND READING: 1
HOW LIKELY ARE YOU TO NOD OFF OR FALL ASLEEP WHILE SITTING QUIETLY AFTER LUNCH WITHOUT ALCOHOL: 0
HOW LIKELY ARE YOU TO NOD OFF OR FALL ASLEEP WHILE WATCHING TV: 1
HOW LIKELY ARE YOU TO NOD OFF OR FALL ASLEEP WHEN YOU ARE A PASSENGER IN A CAR FOR AN HOUR WITHOUT A BREAK: 0
HOW LIKELY ARE YOU TO NOD OFF OR FALL ASLEEP WHILE SITTING INACTIVE IN A PUBLIC PLACE: 0
HOW LIKELY ARE YOU TO NOD OFF OR FALL ASLEEP IN A CAR, WHILE STOPPED FOR A FEW MINUTES IN TRAFFIC: 0

## 2021-10-18 ASSESSMENT — ENCOUNTER SYMPTOMS
APNEA: 1
PHOTOPHOBIA: 0
SHORTNESS OF BREATH: 0
VOMITING: 0
ALLERGIC/IMMUNOLOGIC NEGATIVE: 1
RHINORRHEA: 0
EYE PAIN: 0
ABDOMINAL PAIN: 0
NAUSEA: 0
ABDOMINAL DISTENTION: 0
CHEST TIGHTNESS: 0
CHOKING: 0

## 2021-10-18 NOTE — LETTER
[facility] Sleep Medicine  [address] [facility] [place] [address]  Phone: [phone]  Fax: [phone]    Johnny Don MD    October 18, 2021     Britta Thomas MD  [facility] [address] [place] [id number]    Patient: Kimberlyn Hein   MR Number: [id number]   YOB: 1965   Date of Visit: 10/18/2021       Dear Britta Thomas: Thank you for referring Amanda Lan to me for evaluation/treatment. Below are the relevant portions of my assessment and plan of care. Visit Diagnoses and Associated Orders     Hypersomnia   (New Problem)  -  Primary    needs work-up    Home Sleep Study (HST) [92746 Custom]   - Future Order         Snoring   (New Problem)      needs work-up    Home Sleep Study (HST) [56179 Custom]   - Future Order         Essential hypertension   (Stable)           Non morbid obesity, unspecified obesity type   (Not Stable)                 One or more undiagnosed new problem with uncertain prognosis till final diagnosis is made. Differential diagnosis includes but not limited to: RAMBO, PLMD's, narcolepsy, parasomnias. Reviewed RAMBO (highest likelihood Dx): pathophysiology, diagnosis, complications and treatment. Instructed him not to drive if drowsy. Continue medications per her PCP and other physicians. Limit caffeine use after 3pm. Standard of care is to do in-lab PSG but insurance is mandating an inferior HST. 1 wk follow up after study to review his results. The chronic medical conditions listed are directly related to the primary diagnosis listed above. The management of the primary diagnosis affects the secondary diagnosis and vice versa. This information was analyzed to assess complexity and medical decision making in regards to further testing and management. Continue meds for: HTN. Pt would medically benefit from wt loss for RAMBO (diet, exercise, surgical).     Orders Placed This Encounter   Procedures    Home Sleep Study (HST)       If you have questions, please do not hesitate to call me. I look forward to following Jim Billy along with you.     Sincerely,      Tomas Mcgovern MD

## 2021-10-18 NOTE — PROGRESS NOTES
Jesus Reid MD, Sharyn Davis, [facility]  Tiffanie Kehrt CNP Lunereida Souzaayde [facility] [place] [address]  Radha Karolyn [address], [address] [facility]- [phone]   [facility] [address] Radha Parr. [address] [facility]. [address] [phone]     [address] [place] [address]  [address] [address]  Dept: [phone]  Loc: [phone]    Assessment:      Visit Diagnoses and Associated Orders     Hypersomnia   (New Problem)  -  Primary    needs work-up    Home Sleep Study (HST) [34760 Custom]   - Future Order         Snoring   (New Problem)      needs work-up    Home Sleep Study (HST) [10887 Custom]   - Future Order         Essential hypertension   (Stable)           Non morbid obesity, unspecified obesity type   (Not Stable)                  Plan:      One or more undiagnosed new problem with uncertain prognosis till final diagnosis is made. Differential diagnosis includes but not limited to: RAMBO, PLMD's, narcolepsy, parasomnias. Reviewed RAMBO (highest likelihood Dx): pathophysiology, diagnosis, complications and treatment. Instructed him not to drive if drowsy. Continue medications per her PCP and other physicians. Limit caffeine use after 3pm. Standard of care is to do in-lab PSG but insurance is mandating an inferior HST. 1 wk follow up after study to review his results. The chronic medical conditions listed are directly related to the primary diagnosis listed above. The management of the primary diagnosis affects the secondary diagnosis and vice versa. This information was analyzed to assess complexity and medical decision making in regards to further testing and management. Continue meds for: HTN. Pt would medically benefit from wt loss for RAMBO (diet, exercise, surgical). Orders Placed This Encounter   Procedures    Home Sleep Study (HST)          Subjective:     Patient ID: Mando Leggett is a 60 y.o. male.     Chief Complaint   Patient presents with    Snoring witnessed apnea     Daytime Sleepiness       HPI:      Rossy Leiva is a 60 y.o. male referred by Dr. Elier Madison for a sleep evaluation. He complains of: snoring, excessive daytime sleepiness , non-restorative sleep, nocturia, waking choking/gasping, snorting awake and tossing and turning at night. He denies: cataplexy and hypnagogic hallucinations. Symptoms began several years ago, gradually worsening since that time. Previous evaluation and treatment has included- none    Chronic stable medical conditions: HTN  Chronic unstable medical conditions: obesity    DOT/CDL - No  FAA/'s license -No    Previous Report(s) Reviewed: historical medical records, office notes, andreferral letter(s). Pertinent data has been documented. [place] - Total score: 4    Caffeine Intake - Coffee: 1-2 cup(s) per day    Social History     Socioeconomic History    Marital status:      Spouse name: Not on file    Number of children: Not on file    Years of education: Not on file    Highest education level: Not on file   Occupational History    Not on file   Tobacco Use    Smoking status: Former Smoker     Packs/day: 0.50     Years: 30.00     Pack years: 15.00     Types: Cigarettes     Quit date: 2018     Years since quitting: 3.7    Smokeless tobacco: Never Used   Vaping Use    Vaping Use: Never used   Substance and Sexual Activity    Alcohol use: Yes     Alcohol/week: 6.0 standard drinks     Types: 6 Standard drinks or equivalent per week     Comment: occasional drinker    Drug use: No    Sexual activity: Yes   Other Topics Concern    Not on file   Social History Narrative    Not on file     Social Determinants of Health     Financial Resource Strain:     Difficulty of Paying Living Expenses:    Food Insecurity:     Worried About Running Out of Food in the Last Year:     [address] in the Last Year:    Transportation Needs:     Lack of Transportation (Medical):      Lack of Transportation (Non-Medical):    Physical Activity:     Days of Exercise per Week:     Minutes of Exercise per Session:    Stress:     Feeling of Stress :    Social Connections:     Frequency of Communication with Friends and Family:     Frequency of Social Gatherings with Friends and Family:     Attends Zoroastrian Services:     Active Member of Clubs or Organizations:     Attends Club or Organization Meetings:     Marital Status:    Intimate Partner Violence:     Fear of Current or Ex-Partner:     Emotionally Abused:     Physically Abused:     Sexually Abused:         Current Outpatient Medications   Medication Instructions    albuterol sulfate HFA (VENTOLIN HFA) 108 (90 Base) MCG/ACT inhaler 2 puffs, Inhalation, EVERY 6 HOURS PRN    aspirin 81 mg, Oral, DAILY    atorvastatin (LIPITOR) 20 mg, Oral, DAILY    Blood Pressure KIT Check BP twice a week/HTN    carvedilol (COREG) 6.25 mg, Oral, 2 TIMES DAILY WITH MEALS    losartan (COZAAR) 100 mg, Oral, DAILY    NIFEdipine (ADALAT CC) 30 mg, Oral, 2 TIMES DAILY       Allergies as of 10/18/2021 - Fully Reviewed 10/18/2021   Allergen Reaction Noted    Hctz [hydrochlorothiazide]  10/14/2019    Lisinopril Other (See Comments) 11/25/2013       Patient Active Problem List   Diagnosis    Essential hypertension    Hyperlipidemia    Chest pain    Ex-smoker    Uncontrolled hypertension    Hypertensive emergency    Renal insufficiency    Paresthesia    Non morbid obesity, unspecified obesity type       Past Medical History:   Diagnosis Date    Hyperlipidemia     Hypertension     Meningitis     02/2007    Smoker        Past Surgical History:   Procedure Laterality Date    HERNIA REPAIR      inguinal hernia , right, age 9       Family History   Problem Relation Age of Onset    Heart Disease Father     Hypertension Mother        Review of Systems   Constitutional: Positive for fatigue and unexpected weight change.  Negative for activity change and appetite change. HENT: Positive for congestion. Negative for nosebleeds, postnasal drip, rhinorrhea and sneezing. Eyes: Negative for photophobia, pain and visual disturbance. Respiratory: Positive for apnea. Negative for choking, chest tightness and shortness of breath. Cardiovascular: Negative. Gastrointestinal: Negative for abdominal distention, abdominal pain, nausea and vomiting. Endocrine: Negative for cold intolerance and heat intolerance. Genitourinary: Positive for frequency. Negative for difficulty urinating, dysuria and urgency. Musculoskeletal: Negative. Negative for neck pain and neck stiffness. Skin: Negative. Allergic/Immunologic: Negative. Neurological: Negative for tremors, seizures, syncope and weakness. Hematological: Negative for adenopathy. Does not bruise/bleed easily. Psychiatric/Behavioral: Positive for sleep disturbance. Negative for agitation, behavioral problems and confusion. Objective:     Vitals:  Weight BMI   Wt Readings from Last 3 Encounters:   10/18/21 244 lb (110.7 kg)   08/30/21 244 lb (110.7 kg)   08/30/21 247 lb 12.8 oz (112.4 kg)    Body mass index is 33.09 kg/m². BP HR SaO2   BP Readings from Last 3 Encounters:   10/18/21 130/74   09/01/21 (!) 166/84   08/30/21 (!) 140/85    Pulse Readings from Last 3 Encounters:   10/18/21 60   09/01/21 54   08/30/21 52    SpO2 Readings from Last 3 Encounters:   10/18/21 96%   09/01/21 95%   07/14/21 98%        Physical Exam  Vitals reviewed. Constitutional:       General: He is not in acute distress. Appearance: Normal appearance. He is well-developed. He is not toxic-appearing or diaphoretic. HENT:      Head: Normocephalic and atraumatic. Not macrocephalic and not microcephalic. Right Ear: External ear normal.      Left Ear: External ear normal.      Nose: Septal deviation and mucosal edema present. No nasal deformity. Mouth/Throat:      Lips: Pink.       Mouth: Mucous membranes are moist.      Tongue: No lesions. Palate: No mass. Pharynx: Uvula midline. Uvula swelling present. No oropharyngeal exudate. Tonsils: No tonsillar exudate or tonsillar abscesses. Comments: Tonsils: normal size  Eyes:      General: Lids are normal.      Extraocular Movements: Extraocular movements intact. Conjunctiva/sclera: Conjunctivae normal.      Pupils: Pupils are equal, round, and reactive to light. Neck:      Vascular: No JVD. Trachea: Trachea normal.      Comments: Neck Circ: 17.5 inches    Cardiovascular:      Rate and Rhythm: Normal rate and regular rhythm. Heart sounds: Normal heart sounds. Pulmonary:      Effort: Pulmonary effort is normal.      Breath sounds: Normal breath sounds. Abdominal:      General: Bowel sounds are normal.   Musculoskeletal:      Cervical back: Normal range of motion. Comments: No evidence of cyanosis or clubbing of nails   Skin:     General: Skin is warm. Nails: There is no clubbing. Neurological:      General: No focal deficit present. Mental Status: He is alert. Psychiatric:         Attention and Perception: Attention normal.         Mood and Affect: Mood and affect normal.         Speech: Speech normal.         Behavior: Behavior normal. Behavior is cooperative. Thought Content:  Thought content normal.         Electronically signed by Marissa Felton MD on10/18/2021 at 2:25 PM

## 2021-10-19 ENCOUNTER — TELEPHONE (OUTPATIENT)
Dept: SLEEP CENTER | Age: 56
End: 2021-10-19

## 2021-11-22 ENCOUNTER — OFFICE VISIT (OUTPATIENT)
Dept: FAMILY MEDICINE CLINIC | Age: 56
End: 2021-11-22
Payer: COMMERCIAL

## 2021-11-22 ENCOUNTER — NURSE TRIAGE (OUTPATIENT)
Dept: OTHER | Facility: CLINIC | Age: 56
End: 2021-11-22

## 2021-11-22 ENCOUNTER — HOSPITAL ENCOUNTER (EMERGENCY)
Age: 56
Discharge: HOME | End: 2021-11-22
Payer: COMMERCIAL

## 2021-11-22 ENCOUNTER — APPOINTMENT (OUTPATIENT)
Dept: CT IMAGING | Age: 56
End: 2021-11-22
Payer: COMMERCIAL

## 2021-11-22 VITALS
SYSTOLIC BLOOD PRESSURE: 204 MMHG | BODY MASS INDEX: 33.36 KG/M2 | DIASTOLIC BLOOD PRESSURE: 94 MMHG | HEART RATE: 84 BPM | OXYGEN SATURATION: 98 % | WEIGHT: 246 LBS

## 2021-11-22 VITALS
SYSTOLIC BLOOD PRESSURE: 128 MMHG | TEMPERATURE: 98.9 F | RESPIRATION RATE: 18 BRPM | HEART RATE: 72 BPM | OXYGEN SATURATION: 97 % | DIASTOLIC BLOOD PRESSURE: 77 MMHG

## 2021-11-22 DIAGNOSIS — N20.1 URETERIC STONE: Primary | ICD-10-CM

## 2021-11-22 DIAGNOSIS — R79.89 ELEVATED SERUM CREATININE: ICD-10-CM

## 2021-11-22 DIAGNOSIS — R10.31 RLQ ABDOMINAL PAIN: ICD-10-CM

## 2021-11-22 DIAGNOSIS — I10 HYPERTENSION, ESSENTIAL: Primary | ICD-10-CM

## 2021-11-22 LAB
A/G RATIO: 1.3 (ref 1.1–2.2)
ALBUMIN SERPL-MCNC: 4.2 G/DL (ref 3.4–5)
ALP BLD-CCNC: 103 U/L (ref 40–129)
ALT SERPL-CCNC: 28 U/L (ref 10–40)
ANION GAP SERPL CALCULATED.3IONS-SCNC: 13 MMOL/L (ref 3–16)
AST SERPL-CCNC: 20 U/L (ref 15–37)
BASOPHILS ABSOLUTE: 0.1 K/UL (ref 0–0.2)
BASOPHILS RELATIVE PERCENT: 1 %
BILIRUB SERPL-MCNC: 0.4 MG/DL (ref 0–1)
BILIRUBIN URINE: NEGATIVE
BLOOD, URINE: ABNORMAL
BUN BLDV-MCNC: 27 MG/DL (ref 7–20)
CALCIUM SERPL-MCNC: 10.2 MG/DL (ref 8.3–10.6)
CHLORIDE BLD-SCNC: 102 MMOL/L (ref 99–110)
CLARITY: ABNORMAL
CO2: 21 MMOL/L (ref 21–32)
COLOR: YELLOW
CREAT SERPL-MCNC: 1.7 MG/DL (ref 0.9–1.3)
EOSINOPHILS ABSOLUTE: 0.1 K/UL (ref 0–0.6)
EOSINOPHILS RELATIVE PERCENT: 1.3 %
EPITHELIAL CELLS, UA: 0 /HPF (ref 0–5)
GFR AFRICAN AMERICAN: 50
GFR NON-AFRICAN AMERICAN: 42
GLUCOSE BLD-MCNC: 130 MG/DL (ref 70–99)
GLUCOSE URINE: NEGATIVE MG/DL
HCT VFR BLD CALC: 42.8 % (ref 40.5–52.5)
HEMOGLOBIN: 15 G/DL (ref 13.5–17.5)
HYALINE CASTS: 0 /LPF (ref 0–8)
KETONES, URINE: NEGATIVE MG/DL
LEUKOCYTE ESTERASE, URINE: NEGATIVE
LIPASE: 48 U/L (ref 13–60)
LYMPHOCYTES ABSOLUTE: 1.3 K/UL (ref 1–5.1)
LYMPHOCYTES RELATIVE PERCENT: 12.1 %
MCH RBC QN AUTO: 31.8 PG (ref 26–34)
MCHC RBC AUTO-ENTMCNC: 35 G/DL (ref 31–36)
MCV RBC AUTO: 90.9 FL (ref 80–100)
MICROSCOPIC EXAMINATION: YES
MONOCYTES ABSOLUTE: 1.3 K/UL (ref 0–1.3)
MONOCYTES RELATIVE PERCENT: 11.9 %
NEUTROPHILS ABSOLUTE: 8 K/UL (ref 1.7–7.7)
NEUTROPHILS RELATIVE PERCENT: 73.7 %
NITRITE, URINE: NEGATIVE
PDW BLD-RTO: 13.6 % (ref 12.4–15.4)
PH UA: 5.5 (ref 5–8)
PLATELET # BLD: 305 K/UL (ref 135–450)
PMV BLD AUTO: 7.2 FL (ref 5–10.5)
POTASSIUM REFLEX MAGNESIUM: 4.7 MMOL/L (ref 3.5–5.1)
PROTEIN UA: ABNORMAL MG/DL
RBC # BLD: 4.7 M/UL (ref 4.2–5.9)
RBC UA: 1 /HPF (ref 0–4)
SODIUM BLD-SCNC: 136 MMOL/L (ref 136–145)
SPECIFIC GRAVITY UA: 1.02 (ref 1–1.03)
TOTAL PROTEIN: 7.5 G/DL (ref 6.4–8.2)
URINE REFLEX TO CULTURE: ABNORMAL
URINE TYPE: ABNORMAL
UROBILINOGEN, URINE: 0.2 E.U./DL
WBC # BLD: 10.8 K/UL (ref 4–11)
WBC UA: 1 /HPF (ref 0–5)

## 2021-11-22 PROCEDURE — 80053 COMPREHEN METABOLIC PANEL: CPT

## 2021-11-22 PROCEDURE — G8417 CALC BMI ABV UP PARAM F/U: HCPCS | Performed by: FAMILY MEDICINE

## 2021-11-22 PROCEDURE — 99283 EMERGENCY DEPT VISIT LOW MDM: CPT

## 2021-11-22 PROCEDURE — G8484 FLU IMMUNIZE NO ADMIN: HCPCS | Performed by: FAMILY MEDICINE

## 2021-11-22 PROCEDURE — 6370000000 HC RX 637 (ALT 250 FOR IP): Performed by: NURSE PRACTITIONER

## 2021-11-22 PROCEDURE — 99214 OFFICE O/P EST MOD 30 MIN: CPT | Performed by: FAMILY MEDICINE

## 2021-11-22 PROCEDURE — 83690 ASSAY OF LIPASE: CPT

## 2021-11-22 PROCEDURE — 96372 THER/PROPH/DIAG INJ SC/IM: CPT

## 2021-11-22 PROCEDURE — 74176 CT ABD & PELVIS W/O CONTRAST: CPT

## 2021-11-22 PROCEDURE — G8427 DOCREV CUR MEDS BY ELIG CLIN: HCPCS | Performed by: FAMILY MEDICINE

## 2021-11-22 PROCEDURE — 3017F COLORECTAL CA SCREEN DOC REV: CPT | Performed by: FAMILY MEDICINE

## 2021-11-22 PROCEDURE — 81001 URINALYSIS AUTO W/SCOPE: CPT

## 2021-11-22 PROCEDURE — 36415 COLL VENOUS BLD VENIPUNCTURE: CPT

## 2021-11-22 PROCEDURE — 85025 COMPLETE CBC W/AUTO DIFF WBC: CPT

## 2021-11-22 PROCEDURE — 6360000002 HC RX W HCPCS: Performed by: NURSE PRACTITIONER

## 2021-11-22 PROCEDURE — 1036F TOBACCO NON-USER: CPT | Performed by: FAMILY MEDICINE

## 2021-11-22 RX ORDER — HYDROCODONE BITARTRATE AND ACETAMINOPHEN 5; 325 MG/1; MG/1
2 TABLET ORAL ONCE
Status: COMPLETED | OUTPATIENT
Start: 2021-11-22 | End: 2021-11-22

## 2021-11-22 RX ORDER — TAMSULOSIN HYDROCHLORIDE 0.4 MG/1
0.4 CAPSULE ORAL DAILY
Qty: 10 CAPSULE | Refills: 0 | Status: SHIPPED | OUTPATIENT
Start: 2021-11-22 | End: 2021-12-02

## 2021-11-22 RX ORDER — ONDANSETRON 4 MG/1
4 TABLET, ORALLY DISINTEGRATING ORAL ONCE
Status: COMPLETED | OUTPATIENT
Start: 2021-11-22 | End: 2021-11-22

## 2021-11-22 RX ORDER — CARVEDILOL 6.25 MG/1
6.25 TABLET ORAL 2 TIMES DAILY WITH MEALS
Qty: 60 TABLET | Refills: 3 | Status: SHIPPED | OUTPATIENT
Start: 2021-11-22 | End: 2022-07-18

## 2021-11-22 RX ORDER — KETOROLAC TROMETHAMINE 30 MG/ML
15 INJECTION, SOLUTION INTRAMUSCULAR; INTRAVENOUS ONCE
Status: COMPLETED | OUTPATIENT
Start: 2021-11-22 | End: 2021-11-22

## 2021-11-22 RX ORDER — OXYCODONE HYDROCHLORIDE AND ACETAMINOPHEN 5; 325 MG/1; MG/1
1 TABLET ORAL EVERY 6 HOURS PRN
Qty: 12 TABLET | Refills: 0 | Status: SHIPPED | OUTPATIENT
Start: 2021-11-22 | End: 2021-11-24

## 2021-11-22 RX ORDER — ONDANSETRON 4 MG/1
4 TABLET, ORALLY DISINTEGRATING ORAL EVERY 8 HOURS PRN
Qty: 20 TABLET | Refills: 0 | Status: SHIPPED | OUTPATIENT
Start: 2021-11-22

## 2021-11-22 RX ADMIN — HYDROCODONE BITARTRATE AND ACETAMINOPHEN 2 TABLET: 5; 325 TABLET ORAL at 20:16

## 2021-11-22 RX ADMIN — KETOROLAC TROMETHAMINE 15 MG: 30 INJECTION, SOLUTION INTRAMUSCULAR at 20:16

## 2021-11-22 RX ADMIN — ONDANSETRON 4 MG: 4 TABLET, ORALLY DISINTEGRATING ORAL at 20:16

## 2021-11-22 SDOH — ECONOMIC STABILITY: FOOD INSECURITY: WITHIN THE PAST 12 MONTHS, YOU WORRIED THAT YOUR FOOD WOULD RUN OUT BEFORE YOU GOT MONEY TO BUY MORE.: NEVER TRUE

## 2021-11-22 SDOH — ECONOMIC STABILITY: FOOD INSECURITY: WITHIN THE PAST 12 MONTHS, THE FOOD YOU BOUGHT JUST DIDN'T LAST AND YOU DIDN'T HAVE MONEY TO GET MORE.: NEVER TRUE

## 2021-11-22 ASSESSMENT — PATIENT HEALTH QUESTIONNAIRE - PHQ9
SUM OF ALL RESPONSES TO PHQ QUESTIONS 1-9: 0
2. FEELING DOWN, DEPRESSED OR HOPELESS: 0
SUM OF ALL RESPONSES TO PHQ QUESTIONS 1-9: 0
SUM OF ALL RESPONSES TO PHQ9 QUESTIONS 1 & 2: 0
SUM OF ALL RESPONSES TO PHQ QUESTIONS 1-9: 0
1. LITTLE INTEREST OR PLEASURE IN DOING THINGS: 0

## 2021-11-22 ASSESSMENT — ENCOUNTER SYMPTOMS
CHEST TIGHTNESS: 0
SHORTNESS OF BREATH: 0
ABDOMINAL PAIN: 1
DIARRHEA: 0
VOMITING: 0

## 2021-11-22 ASSESSMENT — SOCIAL DETERMINANTS OF HEALTH (SDOH): HOW HARD IS IT FOR YOU TO PAY FOR THE VERY BASICS LIKE FOOD, HOUSING, MEDICAL CARE, AND HEATING?: NOT HARD AT ALL

## 2021-11-22 ASSESSMENT — PAIN SCALES - GENERAL: PAINLEVEL_OUTOF10: 10

## 2021-11-22 NOTE — TELEPHONE ENCOUNTER
Received call from Jayme Chou at [facility] with [employer] Complaint. Brief description of triage: R abd pain    Triage indicates for patient to go to seen PCP now if no appt available pt advised to go to urgent care or ER. Care advice provided, patient verbalizes understanding; denies any other questions or concerns; instructed to call back for any new or worsening symptoms. Writer provided warm transfer to Pavithrabianca José Antonio at [facility] for appointment scheduling. Attention Provider: Thank you for allowing me to participate in the care of your patient. The patient was connected to triage in response to information provided to the ECC/PSC. Please do not respond through this encounter as the response is not directed to a shared pool. Reason for Disposition   SEVERE pain (e.g., excruciating, unable to do any normal activities)    Answer Assessment - Initial Assessment Questions  1. ONSET: \"When did the pain start? \"      2 days ago and started back up this morning    2. LOCATION: \"Where is the pain located? \"      r side of abd radiates to his lower back    3. PAIN: \"How bad is the pain? \" (Scale 1-10; or mild, moderate, severe)    - MILD (1-3): doesn't interfere with normal activities    - MODERATE (4-7): interferes with normal activities (e.g., work or school) or awakens from sleep    - SEVERE (8-10): excruciating pain, unable to do any normal activities, unable to hold a cup of water      8-9    4. WORK OR EXERCISE: \"Has there been any recent work or exercise that involved this part of the body? \"      No    5. CAUSE: \"What do you think is causing the arm pain? \"      No    6. OTHER SYMPTOMS: \"Do you have any other symptoms? \" (e.g., neck pain, swelling, rash, fever, numbness, weakness)      No    7. PREGNANCY: \"Is there any chance you are pregnant? \" \"When was your last menstrual period? \"      n/a    Protocols used: ARM PAIN-ADULT-[place]

## 2021-11-22 NOTE — PROGRESS NOTES
[address] Note    Date: 11/22/2021                                               Naomi Maharaj:     Chief Complaint   Patient presents with    Abdominal Pain     R radiating to lower back, started last week went away for a bit but came back today       HPI   Right side lower abdominal pain starting a week ago.  + Radiates around to the lower back and R hip. Went away for a few days, but then returned today. Says it's a 9/10 in severity now. +nausea, no vomiting. Is eating and drinking okay, not worse with eating. Patient has history of hypertension. Currently takes carvedilol and losartan and nifedipine. Denies chest pain or shortness of breath. Denies headache. Denies numbness or weakness.          Patient Active Problem List    Diagnosis Date Noted    Renal insufficiency 07/13/2021    Non morbid obesity, unspecified obesity type 10/18/2021    Paresthesia 08/30/2021    Hypertensive emergency 07/11/2021    Uncontrolled hypertension 07/10/2021    Ex-smoker 10/14/2019    Chest pain 08/11/2019    Essential hypertension     Hyperlipidemia        Past Medical History:   Diagnosis Date    Hyperlipidemia     Hypertension     Meningitis     02/2007    Smoker        Current Outpatient Medications   Medication Sig Dispense Refill    carvedilol (COREG) 6.25 MG tablet Take 1 tablet by mouth 2 times daily (with meals) 60 tablet 3    losartan (COZAAR) 100 MG tablet Take 1 tablet by mouth daily 30 tablet 3    NIFEdipine (ADALAT CC) 30 MG extended release tablet Take 1 tablet by mouth 2 times daily 180 tablet 1    aspirin 81 MG EC tablet Take 1 tablet by mouth daily 30 tablet 3    atorvastatin (LIPITOR) 20 MG tablet Take 1 tablet by mouth daily 30 tablet 3    albuterol sulfate HFA (VENTOLIN HFA) 108 (90 Base) MCG/ACT inhaler Inhale 2 puffs into the lungs every 6 hours as needed for Wheezing 1 Inhaler 2    Blood Pressure KIT Check BP twice a week/HTN 1 kit 0     No current facility-administered medications for this visit. Allergies   Allergen Reactions    Hctz [Hydrochlorothiazide]      dehydration    Lisinopril Other (See Comments)     Cough         Review of Systems   No fever, no cough      Vitals:  BP (!) 204/94   Pulse 84   Wt 246 lb (111.6 kg)   SpO2 98%   BMI 33.36 kg/m²     Wt Readings from Last 3 Encounters:   11/22/21 246 lb (111.6 kg)   10/18/21 244 lb (110.7 kg)   08/30/21 244 lb (110.7 kg)        Physical Exam   General:  Well-appearing, + in pain, alert, non-toxic  HEENT:  Normocephalic, atraumatic. Pupils equal and round. CHEST/LUNGS: CTAB, no crackles, no wheeze, no rhonchi. Symmetric rise  CARDIOVASCULAR: RRR,  no murmur, no rub  EXTREMETIES: Normal movement of all extremities  SKIN:  No rash, no cellulitis, no bruising, no petechiae/purpura/vesicles/pustules/abscess  GI: Soft, nondistended. + Tenderness palpation of right lower quadrant. NEURO:  GCS 15, CN2-12 grossly intact, no focal motor/sensory deficits, no cerebellar deficits, normal gait, normal speech      Assessment/Plan     25-year-old male with acute right lower quadrant pain. Given significant tenderness, is concerning for possible appendicitis. Recommend immediate transfer to emergency room for further evaluation treatment. Patient preferred to drive himself. Discharged in guarded condition at 6:18 PM. Spoke with [facility] ED attending Dr. Gilda Daniels, who excepted the transfer at 6:21 PM.    Patient blood pressure is elevated. Likely due to pain. Recommend further evaluation at the ED. 1. Hypertension, essential  -     carvedilol (COREG) 6.25 MG tablet; Take 1 tablet by mouth 2 times daily (with meals), Disp-60 tablet, R-3Normal  2. RLQ abdominal pain       No orders of the defined types were placed in this encounter. No follow-ups on file.     Katherin Ratliff MD, MD    11/22/2021  6:22 PM

## 2021-11-23 ENCOUNTER — HOSPITAL ENCOUNTER (OUTPATIENT)
Dept: SLEEP CENTER | Age: 56
Discharge: HOME | End: 2021-11-23
Payer: COMMERCIAL

## 2021-11-23 DIAGNOSIS — R06.83 SNORING: ICD-10-CM

## 2021-11-23 DIAGNOSIS — G47.10 HYPERSOMNIA: ICD-10-CM

## 2021-11-23 PROCEDURE — 95806 SLEEP STUDY UNATT&RESP EFFT: CPT

## 2021-11-23 NOTE — ED PROVIDER NOTES
[facility]        Pt Name: Myriam Sierra  MRN: [id number]  [place] 1965  Date of evaluation: 11/22/2021  Provider: DEEPTI Vasquez - GONZALEZ  PCP: Alessandro Larry MD  Note Started: 8:40 PM EST       CAR. I have evaluated this patient. My supervising physician was available for consultation. CHIEF COMPLAINT       Chief Complaint   Patient presents with    Abdominal Pain     PT states he has been having right sided abdominal pain x3days. Pt states his PCP sent him for HTN and possible appendicitis. PT states he needs a CT. HISTORY OF PRESENT ILLNESS   (Location, Timing/Onset, Context/Setting, Quality, Duration, Modifying Factors, Severity, Associated Signs and Symptoms)  Note limiting factors. Chief Complaint: right sided flank pain    Myriam Sierra is a 60 y.o. male Right-sided flank/abdominal pain that radiates to testicle over the past 3 days. The patient reports onset of symptoms 3 days ago and then pain did stop. Reports that the pain returned today and is severe. He reports the pain is 10 of 10 on scale and describes as dull. No mitigating or exacerbating factors. The pain is not reproducible. He was seen by primary care and sent to the emergency department for CT scan. No history of abdominal surgery. Denies any headache, fever, lightheadedness, dizziness, visual disturbances. No chest pain or pressure. No neck or back pain. No shortness of breath, cough, or congestion. No vomiting, diarrhea, constipation, or dysuria. No rash. Nursing Notes were all reviewed and agreed with or any disagreements were addressed in the HPI. REVIEW OF SYSTEMS    (2-9 systems for level 4, 10 or more for level 5)     Review of Systems   Constitutional: Negative for activity change, chills and fever. Respiratory: Negative for chest tightness and shortness of breath.     Cardiovascular: Negative for chest pain.   Gastrointestinal: Positive for abdominal pain. Negative for diarrhea and vomiting. Genitourinary: Positive for flank pain. Negative for dysuria. All other systems reviewed and are negative. Positives and Pertinent negatives as per HPI. Except as noted above in the ROS, all other systems were reviewed and negative.        PAST MEDICAL HISTORY     Past Medical History:   Diagnosis Date    Hyperlipidemia     Hypertension     Meningitis     02/2007    Smoker          SURGICAL HISTORY     Past Surgical History:   Procedure Laterality Date    HERNIA REPAIR      inguinal hernia , right, age 9         [address]       Discharge Medication List as of 11/22/2021 11:31 PM      CONTINUE these medications which have NOT CHANGED    Details   carvedilol (COREG) 6.25 MG tablet Take 1 tablet by mouth 2 times daily (with meals), Disp-60 tablet, R-3Normal      losartan (COZAAR) 100 MG tablet Take 1 tablet by mouth daily, Disp-30 tablet, R-3Normal      NIFEdipine (ADALAT CC) 30 MG extended release tablet Take 1 tablet by mouth 2 times daily, Disp-180 tablet, R-1Normal      aspirin 81 MG EC tablet Take 1 tablet by mouth daily, Disp-30 tablet, R-3Normal      atorvastatin (LIPITOR) 20 MG tablet Take 1 tablet by mouth daily, Disp-30 tablet, R-3Normal      albuterol sulfate HFA (VENTOLIN HFA) 108 (90 Base) MCG/ACT inhaler Inhale 2 puffs into the lungs every 6 hours as needed for Wheezing, Disp-1 Inhaler, R-2Historical Med      Blood Pressure KIT Disp-1 kit, R-0, PrintCheck BP twice a week/HTN               ALLERGIES     Hctz [hydrochlorothiazide] and Lisinopril    FAMILYHISTORY       Family History   Problem Relation Age of Onset    Heart Disease Father     Hypertension Mother           SOCIAL HISTORY       Social History     Tobacco Use    Smoking status: Former Smoker     Packs/day: 0.50     Years: 30.00     Pack years: 15.00     Types: Cigarettes     Quit date: 2018     Years since quitting: 3.8    Smokeless tobacco: Never Used   Vaping Use    Vaping Use: Never used   Substance Use Topics    Alcohol use: Yes     Alcohol/week: 6.0 standard drinks     Types: 6 Standard drinks or equivalent per week     Comment: occasional drinker    Drug use: No       SCREENINGS             PHYSICAL EXAM    (up to 7 for level 4, 8 or more for level 5)     ED Triage Vitals [11/22/21 2004]   BP Temp Temp Source Pulse Resp SpO2 Height Weight   (!) 177/98 98.9 °F (37.2 °C) Oral 72 18 97 % -- --       Physical Exam  Vitals and nursing note reviewed. Constitutional:       Appearance: He is well-developed. He is not diaphoretic. HENT:      Head: Normocephalic and atraumatic. Right Ear: External ear normal.      Left Ear: External ear normal.   Eyes:      General:         Right eye: No discharge. Left eye: No discharge. Neck:      Vascular: No JVD. Cardiovascular:      Rate and Rhythm: Normal rate and regular rhythm. Pulses: Normal pulses. Heart sounds: Normal heart sounds. Pulmonary:      Effort: Pulmonary effort is normal. No respiratory distress. Breath sounds: Normal breath sounds. Abdominal:      Palpations: Abdomen is soft. Tenderness: There is abdominal tenderness. Musculoskeletal:         General: Normal range of motion. Cervical back: Normal range of motion and neck supple. Skin:     General: Skin is warm and dry. Coloration: Skin is not pale. Neurological:      Mental Status: He is alert and oriented to person, place, and time.    Psychiatric:         Behavior: Behavior normal.         DIAGNOSTIC RESULTS   LABS:    Labs Reviewed   CBC WITH AUTO DIFFERENTIAL - Abnormal; Notable for the following components:       Result Value    Neutrophils Absolute 8.0 (*)     All other components within normal limits    Narrative:     Performed at:  [facility] [address], [place], [facility] [address]   Phone [phone]   COMPREHENSIVE METABOLIC PANEL W/ REFLEX TO MG FOR LOW K - Abnormal; Notable for the following components:    Glucose 130 (*)     BUN 27 (*)     CREATININE 1.7 (*)     GFR Non- 42 (*)     GFR  50 (*)     All other components within normal limits    Narrative:     Performed at:  [facility] [address], [facility], [address]   Phone [phone]   URINE RT REFLEX TO CULTURE - Abnormal; Notable for the following components:    Clarity, UA CLOUDY (*)     Blood, Urine TRACE (*)     Protein, UA TRACE (*)     All other components within normal limits    Narrative:     Performed at:  [facility] [address], [facility], [address]   Phone [phone]   LIPASE    Narrative:     Performed at:  [facility] [address], [facility], [address]   Phone [phone]   MICROSCOPIC URINALYSIS    Narrative:     Performed at:  [facility] [address], [facility], [address]   Phone [phone]       When ordered only abnormal lab results are displayed. All other labs were within normal range or not returned as of this dictation. EKG: When ordered, EKG's are interpreted by the Emergency Department Physician in the absence of a cardiologist.  Please see their note for interpretation of EKG. RADIOLOGY:   Non-plain film images such as CT, Ultrasound and MRI are read by the radiologist. Plain radiographic images are visualized and preliminarily interpreted by the ED Provider with the below findings:        Interpretation per the Radiologist below, if available at the time of this note:    CT ABDOMEN PELVIS WO CONTRAST Additional Contrast? None   Final Result   1. Proximal right ureteral calculi with obstructive uropathy. 2. Diverticulosis without scan evidence for diverticulitis. No results found.         PROCEDURES   Unless otherwise noted below, none Procedures    CRITICAL CARE TIME   N/A    CONSULTS:  None      EMERGENCY DEPARTMENT COURSE and DIFFERENTIAL DIAGNOSIS/MDM:   Vitals:    Vitals:    11/22/21 2004 11/22/21 2333   BP: (!) 177/98 128/77   Pulse: 72    Resp: 18 18   Temp: 98.9 °F (37.2 °C)    TempSrc: Oral    SpO2: 97%        Patient was given the following medications:  Medications   ketorolac (TORADOL) injection 15 mg (15 mg IntraMUSCular Given 11/22/21 2016)   HYDROcodone-acetaminophen (NORCO) 5-325 MG per tablet 2 tablet (2 tablets Oral Given 11/22/21 2016)   ondansetron (ZOFRAN-ODT) disintegrating tablet 4 mg (4 mg Oral Given 11/22/21 2016)           Briefly, this is a 53-year-old male that presents to the emergency department with complaint of right-sided abdominal/flank pain. The patient did experience pain about 3 weeks ago which resolved without intervention. States that the pain did return today and has been severe without mitigating or exacerbating factor. Patient given Toradol and Norco as well as Zofran. Urinalysis shows hematuria, no infection. CBC is unremarkable. CMP does show an TAYLOR, patient's creatinine is 1.7 and GFR 42. This is acutely different than the patient's baseline. Lipase is normal.    CT ABDOMEN PELVIS WO CONTRAST Additional Contrast? None (Final result)  Result time 11/22/21 22:33:42  Procedure changed from [facility] Additional Contrast? None  Final result by Konrad Gaytan MD (11/22/21 22:33:42)                Impression:    1. Proximal right ureteral calculi with obstructive uropathy. 2. Diverticulosis without scan evidence for diverticulitis. Patient is pain-free upon reassessment. He was given Toradol and [place] in the ER. We did discuss following up with urology tomorrow for appointment. Patient is given strict return precautions and close outpatient follow-up. Instructed to return tonight for return of pain, vomiting, or fever.   The patient does verbalize understanding. He is instructed to avoid NSAIDs given the TAYLOR and that his kidney function should be rechecked this week. I see nothing that would suggest an acute abdomen at this time. Based on history, physical exam, risk factors, and tests my suspicion for bowel obstruction, incarcerated hernia, acute pancreatitis, intra-abdominal abscess, perforated viscus, diverticulitis, cholecystitis, appendicitis, testicular torsion and cardiac ischemia is very low. There is no evidence of peritonitis, sepsis or toxicity at this time. I feel the patient can be managed as an outpatient with follow-up with his family doctor in 24-48 hours. Instructions have been given for the patient to return to the emergency department for worsening of the pain, high fevers, intractable vomiting, bleeding or any other concerns    FINAL IMPRESSION      1. Ureteric stone    2. Elevated serum creatinine          DISPOSITION/PLAN   DISPOSITION Decision To Discharge 11/22/2021 11:29:20 PM      PATIENT REFERRED TO:  Genoveva Bryant MD  [facility]  [phone]    Schedule an appointment as soon as possible for a visit         DISCHARGE MEDICATIONS:  Discharge Medication List as of 11/22/2021 11:31 PM      START taking these medications    Details   oxyCODONE-acetaminophen (PERCOCET) 5-325 MG per tablet Take 1 tablet by mouth every 6 hours as needed for Pain for up to 3 days. Intended supply: 3 days.  Take lowest dose possible to manage pain, Disp-12 tablet, R-0Print      tamsulosin (FLOMAX) 0.4 MG capsule Take 1 capsule by mouth daily for 10 days, Disp-10 capsule, R-0Print      ondansetron (ZOFRAN ODT) 4 MG disintegrating tablet Take 1 tablet by mouth every 8 hours as needed for Nausea, Disp-20 tablet, R-0Print             DISCONTINUED MEDICATIONS:  Discharge Medication List as of 11/22/2021 11:31 PM                 (Please note that portions of this note were completed with a voice recognition program.  Efforts were made to edit the dictations but occasionally words are mis-transcribed.)    DEEPTI Alcala CNP (electronically signed)            DEEPTI Alcala CNP  11/22/21 8014

## 2021-11-24 ENCOUNTER — TELEPHONE (OUTPATIENT)
Dept: FAMILY MEDICINE CLINIC | Age: 56
End: 2021-11-24

## 2021-11-24 DIAGNOSIS — N20.1 URETERIC STONE: Primary | ICD-10-CM

## 2021-11-24 RX ORDER — HYDROCODONE BITARTRATE AND ACETAMINOPHEN 5; 325 MG/1; MG/1
1 TABLET ORAL EVERY 6 HOURS PRN
Qty: 20 TABLET | Refills: 0 | Status: SHIPPED | OUTPATIENT
Start: 2021-11-24 | End: 2021-11-29

## 2021-11-24 NOTE — TELEPHONE ENCOUNTER
Patient was seen by Dr. Nichelle Gallegos on 11-22-21 and sent to ED for kidney stones  Patient is requesting RX for pain  Patient has appt. Monday w/Urologist  [facility] pharmacy  Contact patient

## 2021-11-30 PROCEDURE — 95806 SLEEP STUDY UNATT&RESP EFFT: CPT | Performed by: INTERNAL MEDICINE

## 2021-12-07 ENCOUNTER — TELEPHONE (OUTPATIENT)
Dept: PULMONOLOGY | Age: 56
End: 2021-12-07

## 2021-12-27 ENCOUNTER — TELEPHONE (OUTPATIENT)
Dept: PULMONOLOGY | Age: 56
End: 2021-12-27

## 2021-12-27 NOTE — PROGRESS NOTES
Kimberlyn Hein         : 1965 [address]  Please expedite                                                                       AHI 44 SaO2 88%                                                                       Below 90% for > 2 hrs  Diagnosis: [x] RAMBO (G47.33) [] CSA (G47.31) [] Apnea (G47.30)   Length of Need: [x] 12 Months [] 99 Months [] Other:    Machine (RUY!): [] Respironics Dream Station   2   Auto [x] ResMed AirSense     Auto S11 [] Other:     [x]  CPAP () [] Bilevel ()   Mode: [x] Auto [] Spontaneous    Mode: [x] Auto [] Spontaneous      P min 6 cmh2O  P max 16 cmH2O      Comfort Settings:   - Ramp Pressure: 4 cmH2O                                        - Ramp time: 15 min                                     -  Flex/EPR - 3 full time                                    - For ResMed Bilevel (TiMax-4 sec   TiMin- 0.2 sec)     Humidifier: [x] Heated ()        [x] Water chamber replacement ()/ 1 per 6 months        Mask:   [x] Nasal () /1 per 3 months [] Full Face () /1 per 3 months   [x] Patient choice -Size and fit mask [] Patient Choice - Size and fit mask   [] Dispense:  [] Dispense:    [x] Headgear () / 1 per 3 months [] Headgear () / 1 per 3 months   [x] Replacement Nasal Cushion ()/2 per month [] Interface Replacement ()/1 per month   [] Replacement Nasal Pillows ()/2 per month         Tubing: [x] Heated ()/1 per 3 months    [] Standard ()/1 per 3 months [] Other:           Filters: [x] Non-disposable ()/1 per 6 months     [x] Ultra-Fine, Disposable ()/2 per month        Miscellaneous: [] Chin Strap ()/ 1 per 6 months [] O2 bleed-in:       LPM   [] Oximetry on CPAP/Bilevel []  Other:    [x] Modem: ()         Start Order Date: 21    MEDICAL JUSTIFICATION:  I, the undersigned, certify that the above prescribed supplies are medically necessary for this patients wellbeing.   In my opinion, the supplies are both reasonable and necessary in reference to accepted standards of medicalpractice in treatment of this patients condition. Rossana Pressley MD      NPI: [id number]       Order Signed Date: 21    Electronically signed by Rossana Pressley MD on 2021 at 1:48 PM    [address]  1965  [address] [address] [place]  [phone] (home)   [phone] (mobile)      Insurance Info (confirm with patient if correct):  Payor/Plan Subscr  Sex Relation Sub.  Ins. ID Effective Group Num

## 2021-12-29 RX ORDER — ASPIRIN 81 MG/1
TABLET, COATED ORAL
Qty: 90 TABLET | Refills: 2 | Status: SHIPPED | OUTPATIENT
Start: 2021-12-29

## 2021-12-29 NOTE — TELEPHONE ENCOUNTER
Medication:   Requested Prescriptions     Pending Prescriptions Disp Refills    ASPIRIN LOW DOSE 81 MG EC tablet [Pharmacy Med Name: ASPIRIN EC 81 MG TABLET] 30 tablet 3     Sig: TAKE 1 TABLET BY MOUTH EVERY DAY        Last Filled:  8/9/21    Patient Phone Number: [phone] (home)     Last appt: 11/22/2021   Next appt: Visit date not found    Last OARRS: No flowsheet data found.

## 2022-11-14 ENCOUNTER — HOSPITAL ENCOUNTER (OUTPATIENT)
Age: 57
Discharge: HOME | End: 2022-11-14
Payer: COMMERCIAL

## 2022-11-14 DIAGNOSIS — I10 ESSENTIAL HYPERTENSION: ICD-10-CM

## 2022-11-14 LAB
ANION GAP SERPL CALCULATED.3IONS-SCNC: 9 MMOL/L (ref 3–16)
BUN BLDV-MCNC: 18 MG/DL (ref 7–20)
CALCIUM SERPL-MCNC: 9.7 MG/DL (ref 8.3–10.6)
CHLORIDE BLD-SCNC: 100 MMOL/L (ref 99–110)
CO2: 25 MMOL/L (ref 21–32)
CREAT SERPL-MCNC: 1.4 MG/DL (ref 0.9–1.3)
GFR SERPL CREATININE-BSD FRML MDRD: 58 ML/MIN/{1.73_M2}
GLUCOSE BLD-MCNC: 122 MG/DL (ref 70–99)
POTASSIUM SERPL-SCNC: 4.8 MMOL/L (ref 3.5–5.1)
SODIUM BLD-SCNC: 134 MMOL/L (ref 136–145)

## 2022-11-14 PROCEDURE — 36415 COLL VENOUS BLD VENIPUNCTURE: CPT

## 2022-11-14 PROCEDURE — 80048 BASIC METABOLIC PNL TOTAL CA: CPT

## 2022-11-17 ENCOUNTER — TELEPHONE (OUTPATIENT)
Dept: FAMILY MEDICINE CLINIC | Age: 57
End: 2022-11-17

## 2022-11-17 DIAGNOSIS — Z12.11 COLON CANCER SCREENING: Primary | ICD-10-CM

## 2022-11-17 NOTE — TELEPHONE ENCOUNTER
----- Message from Severa Bilis sent at 11/16/2022  2:06 PM EST -----  Subject: Referral Request    Reason for referral request? Colonoscopy   Provider patient wants to be referred to(if known):     Provider Phone Number(if known): Additional Information for Provider? Patient is requesting a order for a   Colonoscopy.  Please call patient to advise for scheduling once order has   been placed.   ---------------------------------------------------------------------------  --------------  Rm Flores INFO    [phone]; OK to leave message on voicemail  ---------------------------------------------------------------------------  --------------

## 2023-03-01 ENCOUNTER — OFFICE VISIT (OUTPATIENT)
Dept: FAMILY MEDICINE CLINIC | Age: 58
End: 2023-03-01

## 2023-03-01 VITALS
HEART RATE: 77 BPM | SYSTOLIC BLOOD PRESSURE: 138 MMHG | WEIGHT: 257 LBS | OXYGEN SATURATION: 98 % | BODY MASS INDEX: 34.86 KG/M2 | DIASTOLIC BLOOD PRESSURE: 86 MMHG

## 2023-03-01 DIAGNOSIS — R10.9 ABDOMINAL PAIN, UNSPECIFIED ABDOMINAL LOCATION: Primary | ICD-10-CM

## 2023-03-01 DIAGNOSIS — R53.83 FATIGUE, UNSPECIFIED TYPE: ICD-10-CM

## 2023-03-01 DIAGNOSIS — N28.9 RENAL INSUFFICIENCY: ICD-10-CM

## 2023-03-01 DIAGNOSIS — Z12.5 PROSTATE CANCER SCREENING: ICD-10-CM

## 2023-03-01 DIAGNOSIS — E78.2 MIXED HYPERLIPIDEMIA: Chronic | ICD-10-CM

## 2023-03-01 DIAGNOSIS — I10 ESSENTIAL HYPERTENSION: Chronic | ICD-10-CM

## 2023-03-01 LAB
BILIRUBIN, POC: NORMAL
BLOOD URINE, POC: NORMAL
CLARITY, POC: CLEAR
COLOR, POC: YELLOW
GLUCOSE URINE, POC: NORMAL
KETONES, POC: NORMAL
LEUKOCYTE EST, POC: NORMAL
NITRITE, POC: NORMAL
PH, POC: 5.5
PROTEIN, POC: 30
SPECIFIC GRAVITY, POC: 1.02
UROBILINOGEN, POC: 0.2

## 2023-03-01 RX ORDER — TAMSULOSIN HYDROCHLORIDE 0.4 MG/1
0.4 CAPSULE ORAL DAILY
Qty: 90 CAPSULE | Refills: 1 | Status: SHIPPED | OUTPATIENT
Start: 2023-03-01

## 2023-03-01 ASSESSMENT — PATIENT HEALTH QUESTIONNAIRE - PHQ9
SUM OF ALL RESPONSES TO PHQ QUESTIONS 1-9: 0
SUM OF ALL RESPONSES TO PHQ9 QUESTIONS 1 & 2: 0
1. LITTLE INTEREST OR PLEASURE IN DOING THINGS: 0
SUM OF ALL RESPONSES TO PHQ QUESTIONS 1-9: 0
2. FEELING DOWN, DEPRESSED OR HOPELESS: 0
SUM OF ALL RESPONSES TO PHQ QUESTIONS 1-9: 0
SUM OF ALL RESPONSES TO PHQ QUESTIONS 1-9: 0

## 2023-03-01 NOTE — PROGRESS NOTES
Victorina Owen is a 59 y.o. male. HPI:here for complex medical visit, sees nephrologist  Had covid 1 year ago, with diarrhea  Has had kidney stones  Having abd    pain rlq, loose stools, gassy, tight scrotum  Bilat lower back pain  Never had colonoscopy  Has blood in the stool, fever dysuria hematuria's  Quit smoking  Meds, vitamins and allergies reviewed with pt    ROS: No TIA's or unusual headaches, no dysphagia. No prolonged cough. No dyspnea or chest pain on exertion. No abdominal pain, change in bowel habits, black or bloody stools. No urinary tract symptoms. No new or unusual musculoskeletal symptoms. Prior to Visit Medications    Medication Sig Taking? Authorizing Provider   losartan (COZAAR) 100 MG tablet TAKE 1 TABLET BY MOUTH EVERY DAY IN THE MORNING Yes Ashley Mahajan MD   spironolactone (ALDACTONE) 25 MG tablet Take 1 tablet by mouth daily Yes Ashley Mahajan MD   carvedilol (COREG) 6.25 MG tablet Take 1 tablet by mouth in the morning and 1 tablet in the evening. Take with meals. Yes Ashley Mahajan MD   NIFEdipine (ADALAT CC) 30 MG extended release tablet TAKE 1 TABLET BY MOUTH TWICE A DAY Yes Ashley Mahajan MD   Blood Pressure KIT Check BP twice a week/HTN Yes Rose Clement MD   tamsulosin (FLOMAX) 0.4 MG capsule Take 1 capsule by mouth daily for 10 days  Lewis Hebert APRN - CNP       Past Medical History:   Diagnosis Date    Hyperlipidemia     Hypertension     Meningitis     2007    Smoker        Social History     Tobacco Use    Smoking status: Former     Packs/day: 0.50     Years: 30.00     Pack years: 15.00     Types: Cigarettes     Quit date:      Years since quittin.1    Smokeless tobacco: Never   Vaping Use    Vaping Use: Never used   Substance Use Topics    Alcohol use:  Yes     Alcohol/week: 6.0 standard drinks     Types: 6 Standard drinks or equivalent per week     Comment: occasional drinker    Drug use: No       Family History   Problem Relation Age of Onset Heart Disease Father     Hypertension Mother        Allergies   Allergen Reactions    Hctz [Hydrochlorothiazide]      dehydration    Lisinopril Other (See Comments)     Cough         OBJECTIVE:  /86   Pulse 77   Wt 257 lb (116.6 kg)   SpO2 98%   BMI 34.86 kg/m²   GEN:  in NAD obese pounds from last summer  NECK:  Supple without adenopathy. CV:  Regular rate and rhythm, S1 and S2 normal, no murmurs, clicks  PULM:  Chest is clear, no wheezing ,  symmetric air entry throughout both lung fields.   No CVAT  ABD: Soft, trace tenderness right lower quadrant and left groin  : Testicles normal penis normal patient feels things are at smaller and tight but there is really no findings on exam.  No inguinal hernias  EXT: No rash or edema  NEURO: nonfocal  Lab Results   Component Value Date/Time     11/14/2022 07:43 AM    K 4.8 11/14/2022 07:43 AM    K 4.7 11/22/2021 08:13 PM     11/14/2022 07:43 AM    CO2 25 11/14/2022 07:43 AM    BUN 18 11/14/2022 07:43 AM    CREATININE 1.4 11/14/2022 07:43 AM    GLUCOSE 122 11/14/2022 07:43 AM    CALCIUM 9.7 11/14/2022 07:43 AM       Lab Results   Component Value Date/Time    COLORU yellow 03/01/2023 10:37 AM    COLORU YELLOW 11/22/2021 09:11 PM    NITRU Negative 11/22/2021 09:11 PM    GLUCOSEU neg 03/01/2023 10:37 AM    GLUCOSEU Negative 11/22/2021 09:11 PM    KETUA neg 03/01/2023 10:37 AM    KETUA Negative 11/22/2021 09:11 PM    UROBILINOGEN 0.2 11/22/2021 09:11 PM    BILIRUBINUR neg 03/01/2023 10:37 AM      Lab Results   Component Value Date    CHOL 191 08/31/2021    CHOL 242 (H) 03/26/2021    CHOL 179 05/04/2018     Lab Results   Component Value Date    TRIG 261 (H) 08/31/2021    TRIG 402 (H) 03/26/2021    TRIG 265 (H) 05/04/2018     Lab Results   Component Value Date    HDL 32 (L) 08/31/2021    HDL 34 (L) 03/26/2021    HDL 36 (L) 08/27/2019     Lab Results   Component Value Date    LDLCALC 107 (H) 08/31/2021    LDLCALC see below 03/26/2021    LDLCALC see below 08/27/2019     Lab Results   Component Value Date    LABVLDL 52 08/31/2021    LABVLDL see below 03/26/2021    LABVLDL see below 08/27/2019     No results found for: CHOLHDLRATIO   ASSESSMENT/PLAN:  1. Abdominal pain, unspecified abdominal location, vague  ?stone  Add flomax  Get colon  Fluids  Warning signs discussed  CT scan if worsens    2. Essential hypertension  Stable, continue losartan and Aldactone Coreg and nifedipine  Follow-up with nephrology    3. Mixed hyperlipidemia  Labs today    4.  Renal insufficiency  Control hypertension  Repeat lab  Avoid nephrotoxic  See nephrology    5 imm - declines all

## 2023-03-17 ENCOUNTER — HOSPITAL ENCOUNTER (OUTPATIENT)
Age: 58
Discharge: HOME | End: 2023-03-17
Payer: COMMERCIAL

## 2023-03-17 DIAGNOSIS — R53.83 FATIGUE, UNSPECIFIED TYPE: ICD-10-CM

## 2023-03-17 DIAGNOSIS — I10 ESSENTIAL HYPERTENSION: Chronic | ICD-10-CM

## 2023-03-17 DIAGNOSIS — Z12.5 PROSTATE CANCER SCREENING: ICD-10-CM

## 2023-03-17 DIAGNOSIS — E78.2 MIXED HYPERLIPIDEMIA: Chronic | ICD-10-CM

## 2023-03-17 LAB
ALBUMIN SERPL-MCNC: 4.4 G/DL (ref 3.4–5)
ALBUMIN/GLOB SERPL: 1.6 {RATIO} (ref 1.1–2.2)
ALP SERPL-CCNC: 73 U/L (ref 40–129)
ALT SERPL-CCNC: 30 U/L (ref 10–40)
ANION GAP SERPL CALCULATED.3IONS-SCNC: 12 MMOL/L (ref 3–16)
AST SERPL-CCNC: 22 U/L (ref 15–37)
BASOPHILS # BLD: 0.1 K/UL (ref 0–0.2)
BASOPHILS NFR BLD: 1.1 %
BILIRUB SERPL-MCNC: 0.5 MG/DL (ref 0–1)
BUN SERPL-MCNC: 20 MG/DL (ref 7–20)
CALCIUM SERPL-MCNC: 9.7 MG/DL (ref 8.3–10.6)
CHLORIDE SERPL-SCNC: 102 MMOL/L (ref 99–110)
CHOLEST SERPL-MCNC: 261 MG/DL (ref 0–199)
CO2 SERPL-SCNC: 23 MMOL/L (ref 21–32)
CREAT SERPL-MCNC: 1.5 MG/DL (ref 0.9–1.3)
DEPRECATED RDW RBC AUTO: 14 % (ref 12.4–15.4)
EOSINOPHIL # BLD: 0.2 K/UL (ref 0–0.6)
EOSINOPHIL NFR BLD: 2.7 %
GFR SERPLBLD CREATININE-BSD FMLA CKD-EPI: 53 ML/MIN/{1.73_M2}
GLUCOSE P FAST SERPL-MCNC: 125 MG/DL (ref 70–99)
HCT VFR BLD AUTO: 43.9 % (ref 40.5–52.5)
HDLC SERPL-MCNC: 30 MG/DL (ref 40–60)
HGB BLD-MCNC: 15.1 G/DL (ref 13.5–17.5)
LDL CHOLESTEROL CALCULATED: ABNORMAL MG/DL
LDLC SERPL-MCNC: 126 MG/DL
LYMPHOCYTES # BLD: 1.8 K/UL (ref 1–5.1)
LYMPHOCYTES NFR BLD: 29.7 %
MCH RBC QN AUTO: 30.9 PG (ref 26–34)
MCHC RBC AUTO-ENTMCNC: 34.4 G/DL (ref 31–36)
MCV RBC AUTO: 90 FL (ref 80–100)
MONOCYTES # BLD: 0.7 K/UL (ref 0–1.3)
MONOCYTES NFR BLD: 10.6 %
NEUTROPHILS # BLD: 3.4 K/UL (ref 1.7–7.7)
NEUTROPHILS NFR BLD: 55.9 %
PLATELET # BLD AUTO: 220 K/UL (ref 135–450)
PMV BLD AUTO: 7.7 FL (ref 5–10.5)
POTASSIUM SERPL-SCNC: 4.8 MMOL/L (ref 3.5–5.1)
PROT SERPL-MCNC: 7.2 G/DL (ref 6.4–8.2)
PSA SERPL DL<=0.01 NG/ML-MCNC: 2.24 NG/ML (ref 0–4)
RBC # BLD AUTO: 4.88 M/UL (ref 4.2–5.9)
SODIUM SERPL-SCNC: 137 MMOL/L (ref 136–145)
TRIGL SERPL-MCNC: 598 MG/DL (ref 0–150)
TSH SERPL DL<=0.005 MIU/L-ACNC: 1.43 UIU/ML (ref 0.27–4.2)
VLDLC SERPL CALC-MCNC: ABNORMAL MG/DL
WBC # BLD AUTO: 6.1 K/UL (ref 4–11)

## 2023-03-17 PROCEDURE — 36415 COLL VENOUS BLD VENIPUNCTURE: CPT

## 2023-03-17 PROCEDURE — 80053 COMPREHEN METABOLIC PANEL: CPT

## 2023-03-17 PROCEDURE — 85025 COMPLETE CBC W/AUTO DIFF WBC: CPT

## 2023-03-17 PROCEDURE — 83721 ASSAY OF BLOOD LIPOPROTEIN: CPT

## 2023-03-17 PROCEDURE — 84443 ASSAY THYROID STIM HORMONE: CPT

## 2023-03-17 PROCEDURE — 84153 ASSAY OF PSA TOTAL: CPT

## 2023-03-17 PROCEDURE — 84403 ASSAY OF TOTAL TESTOSTERONE: CPT

## 2023-03-17 PROCEDURE — 80061 LIPID PANEL: CPT

## 2023-03-21 LAB — TESTOST SERPL-MCNC: 212 NG/DL (ref 220–1000)

## 2023-03-21 RX ORDER — ATORVASTATIN CALCIUM 20 MG/1
20 TABLET, FILM COATED ORAL DAILY
Qty: 90 TABLET | Refills: 3 | Status: SHIPPED | OUTPATIENT
Start: 2023-03-21

## 2023-06-16 ENCOUNTER — TELEPHONE (OUTPATIENT)
Dept: FAMILY MEDICINE CLINIC | Age: 58
End: 2023-06-16

## 2023-08-08 NOTE — TELEPHONE ENCOUNTER
Called patient but wife answered the phone and will have patient call back . Please schedule for transitional care appointment and transfer to [place] for questions. Ftsg Text: The defect edges were debeveled with a #15 scalpel blade. Given the location of the defect, shape of the defect and the proximity to free margins a full thickness skin graft was deemed most appropriate. Using a sterile surgical marker, the primary defect shape was transferred to the donor site. The area thus outlined was incised deep to adipose tissue with a #15 scalpel blade.  The harvested graft was then trimmed of adipose tissue until only dermis and epidermis was left.  The skin margins of the secondary defect were undermined to an appropriate distance in all directions utilizing iris scissors.  The secondary defect was closed with interrupted buried subcutaneous sutures.  The skin edges were then re-apposed with running  sutures.  The skin graft was then placed in the primary defect and oriented appropriately.

## 2023-08-10 DIAGNOSIS — R79.89 LOW TESTOSTERONE: ICD-10-CM

## 2023-08-10 RX ORDER — SPIRONOLACTONE 25 MG/1
25 TABLET ORAL DAILY
Qty: 60 TABLET | Refills: 5 | Status: SHIPPED | OUTPATIENT
Start: 2023-08-10

## 2023-08-10 RX ORDER — TESTOSTERONE GEL, 1% 10 MG/G
50 GEL TRANSDERMAL DAILY
Qty: 30 EACH | Refills: 5 | Status: SHIPPED | OUTPATIENT
Start: 2023-08-10 | End: 2023-09-09

## 2023-08-10 NOTE — TELEPHONE ENCOUNTER
Medication and Quantity requested:  spironolactone (ALDACTONE) 25 MG tablet    testosterone (TESTIM) 50 MG/5GM (1%) GEL 1% gel     (2 MEDICATIONS)      Last Visit  06/15/23 - Dr Stephanie Moyer and phone number updated in EPIC:  yes    [facility]

## 2023-11-28 ENCOUNTER — TELEPHONE (OUTPATIENT)
Dept: FAMILY MEDICINE CLINIC | Age: 58
End: 2023-11-28

## 2023-11-28 DIAGNOSIS — Z12.11 COLON CANCER SCREENING: Primary | ICD-10-CM

## 2023-11-28 NOTE — TELEPHONE ENCOUNTER
Patient would like a colonoscopy order placed in the sytem so he can get that taken care of.  Please call patient once new order is placed in system with scheduling instructions

## 2024-01-02 ENCOUNTER — HOSPITAL ENCOUNTER (OUTPATIENT)
Age: 59
Discharge: HOME | End: 2024-01-02
Payer: COMMERCIAL

## 2024-01-02 DIAGNOSIS — N18.31 STAGE 3A CHRONIC KIDNEY DISEASE (HCC): ICD-10-CM

## 2024-01-02 LAB
ALBUMIN SERPL-MCNC: 4.5 G/DL (ref 3.4–5)
ANION GAP SERPL CALCULATED.3IONS-SCNC: 9 MMOL/L (ref 3–16)
BUN SERPL-MCNC: 20 MG/DL (ref 7–20)
CALCIUM SERPL-MCNC: 9.5 MG/DL (ref 8.3–10.6)
CHLORIDE SERPL-SCNC: 104 MMOL/L (ref 99–110)
CO2 SERPL-SCNC: 25 MMOL/L (ref 21–32)
CREAT SERPL-MCNC: 1.4 MG/DL (ref 0.8–1.3)
CREAT UR-MCNC: 187 MG/DL (ref 39–259)
GFR SERPLBLD CREATININE-BSD FMLA CKD-EPI: 57 ML/MIN/{1.73_M2}
GLUCOSE SERPL-MCNC: 132 MG/DL (ref 70–99)
MICROALBUMIN UR DL<=1MG/L-MCNC: 5.3 MG/DL
MICROALBUMIN/CREAT UR: 28.3 MG/G (ref 0–30)
PHOSPHATE SERPL-MCNC: 3.1 MG/DL (ref 2.5–4.9)
POTASSIUM SERPL-SCNC: 4.8 MMOL/L (ref 3.5–5.1)
SODIUM SERPL-SCNC: 138 MMOL/L (ref 136–145)

## 2024-01-02 PROCEDURE — 80069 RENAL FUNCTION PANEL: CPT

## 2024-01-02 PROCEDURE — 82570 ASSAY OF URINE CREATININE: CPT

## 2024-01-02 PROCEDURE — 82043 UR ALBUMIN QUANTITATIVE: CPT

## 2024-01-02 PROCEDURE — 36415 COLL VENOUS BLD VENIPUNCTURE: CPT

## 2024-02-20 DIAGNOSIS — R79.89 LOW TESTOSTERONE: ICD-10-CM

## 2024-02-20 RX ORDER — TESTOSTERONE 50 MG/5G
GEL TRANSDERMAL
Qty: 150 G | Refills: 2 | Status: SHIPPED | OUTPATIENT
Start: 2024-02-20 | End: 2024-05-20

## 2024-02-20 NOTE — TELEPHONE ENCOUNTER
Medication:   Requested Prescriptions     Pending Prescriptions Disp Refills    TESTIM 50 MG/5GM (1%) GEL 1% gel [Pharmacy Med Name: TESTIM 1% (50MG) GEL] 150 g      Sig: APPLY 0.05 G TOPICALLY DAILY FOR 30 DAYS. MAX DAILY AMOUNT: 0.05 G        Last Filled:  8/10/2023    Patient Phone Number: [phone] (home)     Last appt: 6/15/2023   Next appt: Visit date not found    Last OARRS:        No data to display

## 2024-04-24 ENCOUNTER — TELEPHONE (OUTPATIENT)
Dept: FAMILY MEDICINE CLINIC | Age: 59
End: 2024-04-24

## 2024-04-24 RX ORDER — METHYLPREDNISOLONE 4 MG/1
TABLET ORAL
Qty: 1 KIT | Refills: 0 | Status: SHIPPED | OUTPATIENT
Start: 2024-04-24 | End: 2024-04-30

## 2024-04-24 NOTE — TELEPHONE ENCOUNTER
Patient called and has poision ivy started 2 days ago and has spread     In left ear and on left side of body arm and leg     He would like to have something called in to help with drying it up and spreading more     Please call and advise     Pharm [facility]

## 2024-05-22 ENCOUNTER — TELEPHONE (OUTPATIENT)
Dept: ADMINISTRATIVE | Age: 59
End: 2024-05-22

## 2024-05-22 DIAGNOSIS — R79.89 LOW TESTOSTERONE: ICD-10-CM

## 2024-05-22 RX ORDER — TESTOSTERONE GEL, 1% 10 MG/G
GEL TRANSDERMAL
Qty: 150 G | Refills: 2 | Status: SHIPPED | OUTPATIENT
Start: 2024-05-22 | End: 2024-06-22

## 2024-05-22 NOTE — TELEPHONE ENCOUNTER
This PA team received a refill request VIA fax.  The patient needs a refill of TESTIN 1% GEL.  The refill request is available in Media.    If this requires a response please respond to the pool ( P MHCX PSC MEDICATION PRE-AUTH).      Thank you please advise patient.

## 2024-07-24 ENCOUNTER — HOSPITAL ENCOUNTER (OUTPATIENT)
Age: 59
Discharge: HOME | End: 2024-07-24
Payer: COMMERCIAL

## 2024-07-24 DIAGNOSIS — N18.31 STAGE 3A CHRONIC KIDNEY DISEASE (HCC): ICD-10-CM

## 2024-07-24 DIAGNOSIS — I10 ESSENTIAL (PRIMARY) HYPERTENSION: ICD-10-CM

## 2024-07-24 LAB
ALBUMIN SERPL-MCNC: 4.4 G/DL (ref 3.4–5)
ANION GAP SERPL CALCULATED.3IONS-SCNC: 12 MMOL/L (ref 3–16)
BUN SERPL-MCNC: 24 MG/DL (ref 7–20)
CALCIUM SERPL-MCNC: 10 MG/DL (ref 8.3–10.6)
CHLORIDE SERPL-SCNC: 102 MMOL/L (ref 99–110)
CO2 SERPL-SCNC: 25 MMOL/L (ref 21–32)
CREAT SERPL-MCNC: 1.4 MG/DL (ref 0.8–1.3)
CREAT UR-MCNC: 248 MG/DL (ref 39–259)
GFR SERPLBLD CREATININE-BSD FMLA CKD-EPI: 57 ML/MIN/{1.73_M2}
GLUCOSE SERPL-MCNC: 109 MG/DL (ref 70–99)
MICROALBUMIN UR DL<=1MG/L-MCNC: 5.7 MG/DL
MICROALBUMIN/CREAT UR: 23 MG/G (ref 0–30)
PHOSPHATE SERPL-MCNC: 3.3 MG/DL (ref 2.5–4.9)
POTASSIUM SERPL-SCNC: 4.2 MMOL/L (ref 3.5–5.1)
SODIUM SERPL-SCNC: 139 MMOL/L (ref 136–145)

## 2024-07-24 PROCEDURE — 82043 UR ALBUMIN QUANTITATIVE: CPT

## 2024-07-24 PROCEDURE — 82570 ASSAY OF URINE CREATININE: CPT

## 2024-07-24 PROCEDURE — 80069 RENAL FUNCTION PANEL: CPT

## 2024-07-24 PROCEDURE — 36415 COLL VENOUS BLD VENIPUNCTURE: CPT

## 2024-08-21 ENCOUNTER — OFFICE VISIT (OUTPATIENT)
Dept: FAMILY MEDICINE CLINIC | Age: 59
End: 2024-08-21
Payer: COMMERCIAL

## 2024-08-21 VITALS
BODY MASS INDEX: 34.4 KG/M2 | HEART RATE: 69 BPM | DIASTOLIC BLOOD PRESSURE: 88 MMHG | OXYGEN SATURATION: 99 % | HEIGHT: 72 IN | SYSTOLIC BLOOD PRESSURE: 136 MMHG | WEIGHT: 254 LBS

## 2024-08-21 DIAGNOSIS — Z87.891 EX-SMOKER: ICD-10-CM

## 2024-08-21 DIAGNOSIS — I10 ESSENTIAL HYPERTENSION: Primary | Chronic | ICD-10-CM

## 2024-08-21 DIAGNOSIS — E78.2 MIXED HYPERLIPIDEMIA: Chronic | ICD-10-CM

## 2024-08-21 DIAGNOSIS — Z00.00 WELL ADULT EXAM: ICD-10-CM

## 2024-08-21 DIAGNOSIS — E66.9 NON MORBID OBESITY, UNSPECIFIED OBESITY TYPE: Chronic | ICD-10-CM

## 2024-08-21 DIAGNOSIS — I10 ESSENTIAL HYPERTENSION: Chronic | ICD-10-CM

## 2024-08-21 PROCEDURE — 3075F SYST BP GE 130 - 139MM HG: CPT | Performed by: FAMILY MEDICINE

## 2024-08-21 PROCEDURE — 3079F DIAST BP 80-89 MM HG: CPT | Performed by: FAMILY MEDICINE

## 2024-08-21 PROCEDURE — 99396 PREV VISIT EST AGE 40-64: CPT | Performed by: FAMILY MEDICINE

## 2024-08-21 SDOH — ECONOMIC STABILITY: INCOME INSECURITY: HOW HARD IS IT FOR YOU TO PAY FOR THE VERY BASICS LIKE FOOD, HOUSING, MEDICAL CARE, AND HEATING?: NOT HARD AT ALL

## 2024-08-21 SDOH — ECONOMIC STABILITY: FOOD INSECURITY: WITHIN THE PAST 12 MONTHS, YOU WORRIED THAT YOUR FOOD WOULD RUN OUT BEFORE YOU GOT MONEY TO BUY MORE.: NEVER TRUE

## 2024-08-21 SDOH — ECONOMIC STABILITY: FOOD INSECURITY: WITHIN THE PAST 12 MONTHS, THE FOOD YOU BOUGHT JUST DIDN'T LAST AND YOU DIDN'T HAVE MONEY TO GET MORE.: NEVER TRUE

## 2024-08-21 ASSESSMENT — PATIENT HEALTH QUESTIONNAIRE - PHQ9
SUM OF ALL RESPONSES TO PHQ QUESTIONS 1-9: 0
1. LITTLE INTEREST OR PLEASURE IN DOING THINGS: NOT AT ALL
SUM OF ALL RESPONSES TO PHQ QUESTIONS 1-9: 0
SUM OF ALL RESPONSES TO PHQ QUESTIONS 1-9: 0
SUM OF ALL RESPONSES TO PHQ9 QUESTIONS 1 & 2: 0
SUM OF ALL RESPONSES TO PHQ QUESTIONS 1-9: 0
2. FEELING DOWN, DEPRESSED OR HOPELESS: NOT AT ALL

## 2024-08-21 NOTE — PROGRESS NOTES
Chief Complaint:   Narciso Avila is a 59 y.o. male who presents for complete physical examination    History of Present Illness:    Here for cpe  Bp at home 140 /80  Saw nephrologist last week , bp was up [facility] nifedipine form 30 to 60  Off flomax, was for kidney stone  Ran out  of testosterone 3 mo ago  Needs physical for  forms completed  Patient Active Problem List   Diagnosis    Essential hypertension    Hyperlipidemia    Chest pain    Ex-smoker    Uncontrolled hypertension    Hypertensive emergency    Renal insufficiency    Paresthesia    Non morbid obesity, unspecified obesity type     Past Medical History:   Diagnosis Date    Hyperlipidemia     Hypertension     Meningitis     02/2007    Smoker        Past Surgical History:   Procedure Laterality Date    HERNIA REPAIR      inguinal hernia , right, age 3       Current Outpatient Medications   Medication Sig Dispense Refill    spironolactone (ALDACTONE) 25 MG tablet Take 1 tablet by mouth daily 60 tablet 5    NIFEdipine (PROCARDIA XL) 60 MG extended release tablet Take 1 tablet by mouth daily 30 tablet 5    losartan (COZAAR) 100 MG tablet TAKE 1 TABLET BY MOUTH EVERY DAY IN THE MORNING 90 tablet 1    carvedilol (COREG) 6.25 MG tablet TAKE 1 TABLET BY MOUTH IN THE MORNING AND 1 TABLET IN THE EVENING. TAKE WITH MEALS. 180 tablet 2    tamsulosin (FLOMAX) 0.4 MG capsule Take 1 capsule by mouth daily 90 capsule 1    Blood Pressure KIT Check BP twice a week/HTN 1 kit 0    testosterone (TESTIM) 50 MG/5GM (1%) GEL 1% gel APPLY 0.05 G TOPICALLY DAILY FOR 30 DAYS. MAX DAILY AMOUNT: 0.05 G 150 g 2     No current facility-administered medications for this visit.     Allergies   Allergen Reactions    Hctz [Hydrochlorothiazide]      dehydration    Lisinopril Other (See Comments)     Cough         Social History     Socioeconomic History    Marital status:    Tobacco Use    Smoking status: Former     Current packs/day: 0.00     Average packs/day: 0.5

## 2024-08-22 LAB
ALBUMIN SERPL-MCNC: 4.8 G/DL (ref 3.4–5)
ALBUMIN/GLOB SERPL: 2 {RATIO} (ref 1.1–2.2)
ALP SERPL-CCNC: 77 U/L (ref 40–129)
ALT SERPL-CCNC: 31 U/L (ref 10–40)
ANION GAP SERPL CALCULATED.3IONS-SCNC: 12 MMOL/L (ref 3–16)
AST SERPL-CCNC: 19 U/L (ref 15–37)
BASOPHILS # BLD: 0 K/UL (ref 0–0.2)
BASOPHILS NFR BLD: 0.6 %
BILIRUB SERPL-MCNC: 0.4 MG/DL (ref 0–1)
BUN SERPL-MCNC: 26 MG/DL (ref 7–20)
CALCIUM SERPL-MCNC: 9.8 MG/DL (ref 8.3–10.6)
CHLORIDE SERPL-SCNC: 101 MMOL/L (ref 99–110)
CHOLEST SERPL-MCNC: 272 MG/DL (ref 0–199)
CO2 SERPL-SCNC: 24 MMOL/L (ref 21–32)
CREAT SERPL-MCNC: 1.3 MG/DL (ref 0.8–1.3)
DEPRECATED RDW RBC AUTO: 14.2 % (ref 12.4–15.4)
EOSINOPHIL # BLD: 0.1 K/UL (ref 0–0.6)
EOSINOPHIL NFR BLD: 1.6 %
EST. AVERAGE GLUCOSE BLD GHB EST-MCNC: 108.3 MG/DL
GFR SERPLBLD CREATININE-BSD FMLA CKD-EPI: 62 ML/MIN/{1.73_M2}
GLUCOSE P FAST SERPL-MCNC: 88 MG/DL (ref 70–99)
HBA1C MFR BLD: 5.4 %
HCT VFR BLD AUTO: 46.1 % (ref 40.5–52.5)
HDLC SERPL-MCNC: 32 MG/DL (ref 40–60)
HGB BLD-MCNC: 15.8 G/DL (ref 13.5–17.5)
LDLC SERPL CALC-MCNC: ABNORMAL MG/DL
LDLC SERPL-MCNC: 128 MG/DL
LYMPHOCYTES # BLD: 2.2 K/UL (ref 1–5.1)
LYMPHOCYTES NFR BLD: 28.6 %
MCH RBC QN AUTO: 32.1 PG (ref 26–34)
MCHC RBC AUTO-ENTMCNC: 34.3 G/DL (ref 31–36)
MCV RBC AUTO: 93.6 FL (ref 80–100)
MONOCYTES # BLD: 1 K/UL (ref 0–1.3)
MONOCYTES NFR BLD: 12.5 %
NEUTROPHILS # BLD: 4.3 K/UL (ref 1.7–7.7)
NEUTROPHILS NFR BLD: 56.7 %
PLATELET # BLD AUTO: 259 K/UL (ref 135–450)
PMV BLD AUTO: 7.5 FL (ref 5–10.5)
POTASSIUM SERPL-SCNC: 4.7 MMOL/L (ref 3.5–5.1)
PROT SERPL-MCNC: 7.2 G/DL (ref 6.4–8.2)
PSA SERPL DL<=0.01 NG/ML-MCNC: 6.75 NG/ML (ref 0–4)
RBC # BLD AUTO: 4.92 M/UL (ref 4.2–5.9)
SODIUM SERPL-SCNC: 137 MMOL/L (ref 136–145)
TRIGL SERPL-MCNC: 518 MG/DL (ref 0–150)
TSH SERPL DL<=0.005 MIU/L-ACNC: 1.73 UIU/ML (ref 0.27–4.2)
VLDLC SERPL CALC-MCNC: ABNORMAL MG/DL
WBC # BLD AUTO: 7.7 K/UL (ref 4–11)

## 2024-08-23 DIAGNOSIS — E78.2 MIXED HYPERLIPIDEMIA: Primary | Chronic | ICD-10-CM

## 2024-08-23 DIAGNOSIS — R97.20 ELEVATED PSA: ICD-10-CM

## 2024-08-23 LAB — TESTOST SERPL-MCNC: 223 NG/DL (ref 193–740)

## 2024-08-23 RX ORDER — ROSUVASTATIN CALCIUM 10 MG/1
10 TABLET, COATED ORAL NIGHTLY
Qty: 30 TABLET | Refills: 3 | Status: SHIPPED | OUTPATIENT
Start: 2024-08-23

## 2024-12-05 DIAGNOSIS — I10 ESSENTIAL (PRIMARY) HYPERTENSION: ICD-10-CM

## 2024-12-05 RX ORDER — CARVEDILOL 6.25 MG/1
6.25 TABLET ORAL 2 TIMES DAILY WITH MEALS
Qty: 180 TABLET | Refills: 2 | Status: SHIPPED | OUTPATIENT
Start: 2024-12-05

## 2024-12-05 NOTE — TELEPHONE ENCOUNTER
Medication and Quantity requested:    carvedilol (COREG) 6.25 MG tablet     Last Visit  08/21/24 - Dr Finley    Pharmacy and phone number updated in EPIC:  yes    [facility] [address].

## 2024-12-18 ENCOUNTER — HOSPITAL ENCOUNTER (OUTPATIENT)
Age: 59
Discharge: HOME | End: 2024-12-18
Payer: COMMERCIAL

## 2024-12-18 DIAGNOSIS — R97.20 ELEVATED PSA: ICD-10-CM

## 2024-12-18 DIAGNOSIS — E78.2 MIXED HYPERLIPIDEMIA: Chronic | ICD-10-CM

## 2024-12-18 LAB
CHOLEST SERPL-MCNC: 172 MG/DL (ref 0–199)
HDLC SERPL-MCNC: 32 MG/DL (ref 40–60)
LDLC SERPL CALC-MCNC: ABNORMAL MG/DL
LDLC SERPL-MCNC: 78 MG/DL
PSA SERPL DL<=0.01 NG/ML-MCNC: 2.47 NG/ML (ref 0–4)
TRIGL SERPL-MCNC: 374 MG/DL (ref 0–150)
VLDLC SERPL CALC-MCNC: ABNORMAL MG/DL

## 2024-12-18 PROCEDURE — 80061 LIPID PANEL: CPT

## 2024-12-18 PROCEDURE — 84153 ASSAY OF PSA TOTAL: CPT

## 2024-12-18 PROCEDURE — 36415 COLL VENOUS BLD VENIPUNCTURE: CPT

## 2024-12-23 RX ORDER — ROSUVASTATIN CALCIUM 10 MG/1
10 TABLET, COATED ORAL NIGHTLY
Qty: 30 TABLET | Refills: 3 | Status: SHIPPED | OUTPATIENT
Start: 2024-12-23

## 2024-12-23 NOTE — TELEPHONE ENCOUNTER
Lov 8/21/24  Lrf 30 3 8/23/24 Medication:   Requested Prescriptions     Pending Prescriptions Disp Refills    rosuvastatin (CRESTOR) 10 MG tablet [Pharmacy Med Name: ROSUVASTATIN CALCIUM 10 MG TAB] 30 tablet 3     Sig: TAKE 1 TABLET BY MOUTH EVERY DAY AT NIGHT       Last Filled:      Patient Phone Number: [phone] (home)     Last appt: 8/21/2024   Next appt: Visit date not found    Last Lipid:   Lab Results   Component Value Date/Time    CHOL 172 12/18/2024 07:38 AM    TRIG 374 12/18/2024 07:38 AM    HDL 32 12/18/2024 07:38 AM                4 = No assist / stand by assistance

## 2025-01-10 ENCOUNTER — HOSPITAL ENCOUNTER (OUTPATIENT)
Age: 60
Discharge: HOME | End: 2025-01-10

## 2025-01-10 DIAGNOSIS — N18.31 STAGE 3A CHRONIC KIDNEY DISEASE (HCC): ICD-10-CM

## 2025-01-10 LAB
25(OH)D3 SERPL-MCNC: 15.5 NG/ML
ALBUMIN SERPL-MCNC: 4.5 G/DL (ref 3.4–5)
ANION GAP SERPL CALCULATED.3IONS-SCNC: 11 MMOL/L (ref 3–16)
BACTERIA URNS QL MICRO: NORMAL /HPF
BILIRUB UR QL STRIP.AUTO: NEGATIVE
BUN SERPL-MCNC: 20 MG/DL (ref 7–20)
CALCIUM SERPL-MCNC: 10 MG/DL (ref 8.3–10.6)
CHLORIDE SERPL-SCNC: 103 MMOL/L (ref 99–110)
CLARITY UR: CLEAR
CO2 SERPL-SCNC: 25 MMOL/L (ref 21–32)
COLOR UR: YELLOW
CREAT SERPL-MCNC: 1.2 MG/DL (ref 0.8–1.3)
CREAT UR-MCNC: 144 MG/DL (ref 39–259)
EPI CELLS #/AREA URNS AUTO: 0 /HPF (ref 0–5)
GFR SERPLBLD CREATININE-BSD FMLA CKD-EPI: 69 ML/MIN/{1.73_M2}
GLUCOSE SERPL-MCNC: 97 MG/DL (ref 70–99)
GLUCOSE UR STRIP.AUTO-MCNC: NEGATIVE MG/DL
HGB UR QL STRIP.AUTO: NEGATIVE
HYALINE CASTS #/AREA URNS AUTO: 0 /LPF (ref 0–8)
KETONES UR STRIP.AUTO-MCNC: NEGATIVE MG/DL
LEUKOCYTE ESTERASE UR QL STRIP.AUTO: NEGATIVE
MICROALBUMIN UR DL<=1MG/L-MCNC: 5.28 MG/DL
MICROALBUMIN/CREAT UR: 36.7 MG/G (ref 0–30)
NITRITE UR QL STRIP.AUTO: NEGATIVE
PH UR STRIP.AUTO: 5.5 [PH] (ref 5–8)
PHOSPHATE SERPL-MCNC: 3 MG/DL (ref 2.5–4.9)
POTASSIUM SERPL-SCNC: 4.6 MMOL/L (ref 3.5–5.1)
PROT UR STRIP.AUTO-MCNC: NEGATIVE MG/DL
RBC CLUMPS #/AREA URNS AUTO: 2 /HPF (ref 0–4)
SODIUM SERPL-SCNC: 139 MMOL/L (ref 136–145)
SP GR UR STRIP.AUTO: 1.02 (ref 1–1.03)
UA DIPSTICK W REFLEX MICRO PNL UR: NORMAL
URN SPEC COLLECT METH UR: NORMAL
UROBILINOGEN UR STRIP-ACNC: 1 E.U./DL
WBC #/AREA URNS AUTO: 1 /HPF (ref 0–5)

## 2025-01-12 LAB — ALDOST SERPL-MCNC: 14.1 NG/DL

## 2025-01-13 LAB — RENIN PLAS-CCNC: 21.2 NG/ML/HR

## 2025-01-16 PROBLEM — G47.33 OBSTRUCTIVE SLEEP APNEA SYNDROME: Status: ACTIVE | Noted: 2025-01-16

## 2025-01-16 PROBLEM — E66.812 CLASS 2 SEVERE OBESITY DUE TO EXCESS CALORIES WITH SERIOUS COMORBIDITY AND BODY MASS INDEX (BMI) OF 36.0 TO 36.9 IN ADULT: Chronic | Status: ACTIVE | Noted: 2021-10-18

## 2025-01-16 PROBLEM — E66.01 CLASS 2 SEVERE OBESITY DUE TO EXCESS CALORIES WITH SERIOUS COMORBIDITY AND BODY MASS INDEX (BMI) OF 36.0 TO 36.9 IN ADULT: Chronic | Status: ACTIVE | Noted: 2021-10-18

## 2025-03-25 RX ORDER — ROSUVASTATIN CALCIUM 10 MG/1
10 TABLET, COATED ORAL NIGHTLY
Qty: 90 TABLET | Refills: 1 | Status: SHIPPED | OUTPATIENT
Start: 2025-03-25

## 2025-03-25 NOTE — TELEPHONE ENCOUNTER
Medication:   Requested Prescriptions     Pending Prescriptions Disp Refills    rosuvastatin (CRESTOR) 10 MG tablet [Pharmacy Med Name: ROSUVASTATIN CALCIUM 10 MG TAB] 90 tablet 1     Sig: TAKE 1 TABLET BY MOUTH EVERY DAY AT NIGHT       Last Filled:  12/23/24    Patient Phone Number: [phone] (home)     Last appt: 8/21/2024   Next appt: Visit date not found    Last Lipid:   Lab Results   Component Value Date/Time    CHOL 172 12/18/2024 07:38 AM    TRIG 374 12/18/2024 07:38 AM    HDL 32 12/18/2024 07:38 AM

## 2025-05-19 ENCOUNTER — TELEPHONE (OUTPATIENT)
Dept: FAMILY MEDICINE CLINIC | Age: 60
End: 2025-05-19

## 2025-05-19 DIAGNOSIS — I10 ESSENTIAL HYPERTENSION: Primary | ICD-10-CM

## 2025-05-19 DIAGNOSIS — Z00.00 WELL ADULT EXAM: ICD-10-CM

## 2025-05-19 DIAGNOSIS — E78.2 MIXED HYPERLIPIDEMIA: ICD-10-CM

## 2025-05-19 NOTE — TELEPHONE ENCOUNTER
----- Message from Gladys COLE sent at 5/19/2025 10:18 AM EDT -----  Regarding: [facility] Referral Request  [facility] Referral Request    Reason for referral request: Lab/Test Order    Specialist/Lab/Test patient is requesting (if known): Lab/Blood work    Specialist Phone Number (if applicable):    Additional Information Pt wanted to have an order for his blood work  --------------------------------------------------------------------------------------------------------------------------    Relationship to Patient: Self     Call Back Information: OK to leave message on voicemail  Preferred Call Back Number: Phone [phone]

## 2025-05-19 NOTE — TELEPHONE ENCOUNTER
Labs signed.    Fast for 10 hours then stop into the office to have your labs drawn.    Lab is open Monday - Friday, 7:30am - 3:30pm, no need for appointment.    You may only have water and black coffee prior to having labs drawn.

## 2025-05-30 DIAGNOSIS — E78.2 MIXED HYPERLIPIDEMIA: ICD-10-CM

## 2025-05-30 DIAGNOSIS — I10 ESSENTIAL HYPERTENSION: ICD-10-CM

## 2025-05-30 DIAGNOSIS — Z00.00 WELL ADULT EXAM: ICD-10-CM

## 2025-05-31 ENCOUNTER — RESULTS FOLLOW-UP (OUTPATIENT)
Dept: FAMILY MEDICINE CLINIC | Age: 60
End: 2025-05-31

## 2025-05-31 LAB
ALBUMIN SERPL-MCNC: 4.7 G/DL (ref 3.4–5)
ALBUMIN/GLOB SERPL: 2 {RATIO} (ref 1.1–2.2)
ALP SERPL-CCNC: 87 U/L (ref 40–129)
ALT SERPL-CCNC: 38 U/L (ref 10–40)
ANION GAP SERPL CALCULATED.3IONS-SCNC: 10 MMOL/L (ref 3–16)
AST SERPL-CCNC: 25 U/L (ref 15–37)
BASOPHILS # BLD: 0.1 K/UL (ref 0–0.2)
BASOPHILS NFR BLD: 0.9 %
BILIRUB SERPL-MCNC: 0.4 MG/DL (ref 0–1)
BUN SERPL-MCNC: 19 MG/DL (ref 7–20)
CALCIUM SERPL-MCNC: 9.9 MG/DL (ref 8.3–10.6)
CHLORIDE SERPL-SCNC: 102 MMOL/L (ref 99–110)
CHOLEST SERPL-MCNC: 268 MG/DL (ref 0–199)
CO2 SERPL-SCNC: 24 MMOL/L (ref 21–32)
CREAT SERPL-MCNC: 1.2 MG/DL (ref 0.8–1.3)
DEPRECATED RDW RBC AUTO: 14.5 % (ref 12.4–15.4)
EOSINOPHIL # BLD: 0.2 K/UL (ref 0–0.6)
EOSINOPHIL NFR BLD: 3.4 %
EST. AVERAGE GLUCOSE BLD GHB EST-MCNC: 122.6 MG/DL
GFR SERPLBLD CREATININE-BSD FMLA CKD-EPI: 68 ML/MIN/{1.73_M2}
GLUCOSE P FAST SERPL-MCNC: 127 MG/DL (ref 70–99)
HBA1C MFR BLD: 5.9 %
HCT VFR BLD AUTO: 43.8 % (ref 40.5–52.5)
HDLC SERPL-MCNC: 31 MG/DL (ref 40–60)
HGB BLD-MCNC: 15.3 G/DL (ref 13.5–17.5)
LDL CHOLESTEROL: ABNORMAL MG/DL
LDLC SERPL-MCNC: 115 MG/DL
LYMPHOCYTES # BLD: 1.9 K/UL (ref 1–5.1)
LYMPHOCYTES NFR BLD: 31.3 %
MCH RBC QN AUTO: 31.4 PG (ref 26–34)
MCHC RBC AUTO-ENTMCNC: 34.9 G/DL (ref 31–36)
MCV RBC AUTO: 89.8 FL (ref 80–100)
MONOCYTES # BLD: 0.6 K/UL (ref 0–1.3)
MONOCYTES NFR BLD: 10.4 %
NEUTROPHILS # BLD: 3.3 K/UL (ref 1.7–7.7)
NEUTROPHILS NFR BLD: 54 %
PLATELET # BLD AUTO: 226 K/UL (ref 135–450)
PMV BLD AUTO: 7.7 FL (ref 5–10.5)
POTASSIUM SERPL-SCNC: 4.7 MMOL/L (ref 3.5–5.1)
PROT SERPL-MCNC: 7.1 G/DL (ref 6.4–8.2)
RBC # BLD AUTO: 4.88 M/UL (ref 4.2–5.9)
SODIUM SERPL-SCNC: 136 MMOL/L (ref 136–145)
TRIGL SERPL-MCNC: 785 MG/DL (ref 0–150)
TSH SERPL DL<=0.005 MIU/L-ACNC: 1.33 UIU/ML (ref 0.27–4.2)
VLDLC SERPL CALC-MCNC: ABNORMAL MG/DL
WBC # BLD AUTO: 6.1 K/UL (ref 4–11)

## 2025-06-03 PROBLEM — E66.09 CLASS 1 OBESITY DUE TO EXCESS CALORIES WITH SERIOUS COMORBIDITY AND BODY MASS INDEX (BMI) OF 34.0 TO 34.9 IN ADULT: Status: ACTIVE | Noted: 2025-06-03

## 2025-06-03 PROBLEM — E66.811 CLASS 1 OBESITY DUE TO EXCESS CALORIES WITH SERIOUS COMORBIDITY AND BODY MASS INDEX (BMI) OF 34.0 TO 34.9 IN ADULT: Status: ACTIVE | Noted: 2025-06-03

## 2025-07-30 RX ORDER — ROSUVASTATIN CALCIUM 10 MG/1
10 TABLET, COATED ORAL NIGHTLY
Qty: 90 TABLET | Refills: 0 | Status: SHIPPED | OUTPATIENT
Start: 2025-07-30

## 2025-07-30 NOTE — TELEPHONE ENCOUNTER
Medication:   Requested Prescriptions     Pending Prescriptions Disp Refills    rosuvastatin (CRESTOR) 10 MG tablet 90 tablet 1     Sig: Take 1 tablet by mouth nightly       Last Filled:  3/25/25    Patient Phone Number: [phone] (home)     Last appt: 8/21/2024   Next appt: 9/10/2025    Last Lipid:   Lab Results   Component Value Date/Time    CHOL 172 12/18/2024 07:38 AM    TRIG 374 12/18/2024 07:38 AM    HDL 31 05/30/2025 10:19 AM

## 2025-07-30 NOTE — TELEPHONE ENCOUNTER
rosuvastatin (CRESTOR) 10 MG tablet [[id number]]     Patient is requesting a refill for the listed medication sent to the following pharmacy.    Last ov 08/21/24    Patient is currently schedule for 09/10.      [facility]/pharmacy #[id number] - [place], [place] - [address] - P [phone] - F [phone]

## 2025-08-15 DIAGNOSIS — I10 ESSENTIAL (PRIMARY) HYPERTENSION: ICD-10-CM

## 2025-08-15 RX ORDER — CARVEDILOL 6.25 MG/1
6.25 TABLET ORAL 2 TIMES DAILY WITH MEALS
Qty: 180 TABLET | Refills: 0 | Status: SHIPPED | OUTPATIENT
Start: 2025-08-15